# Patient Record
Sex: MALE | Race: WHITE | NOT HISPANIC OR LATINO | URBAN - METROPOLITAN AREA
[De-identification: names, ages, dates, MRNs, and addresses within clinical notes are randomized per-mention and may not be internally consistent; named-entity substitution may affect disease eponyms.]

---

## 2016-10-31 RX ORDER — CARVEDILOL PHOSPHATE 80 MG/1
1 CAPSULE, EXTENDED RELEASE ORAL
Qty: 60 | Refills: 0 | OUTPATIENT
Start: 2016-10-31 | End: 2017-12-29

## 2017-08-10 ENCOUNTER — OUTPATIENT (OUTPATIENT)
Dept: OUTPATIENT SERVICES | Facility: HOSPITAL | Age: 82
LOS: 1 days | Discharge: HOME | End: 2017-08-10
Payer: MEDICARE

## 2017-08-10 DIAGNOSIS — M48.20 KISSING SPINE, SITE UNSPECIFIED: ICD-10-CM

## 2017-08-10 PROCEDURE — 72100 X-RAY EXAM L-S SPINE 2/3 VWS: CPT | Mod: 26

## 2017-08-10 PROCEDURE — 72070 X-RAY EXAM THORAC SPINE 2VWS: CPT | Mod: 26

## 2017-08-11 ENCOUNTER — OUTPATIENT (OUTPATIENT)
Dept: OUTPATIENT SERVICES | Facility: HOSPITAL | Age: 82
LOS: 1 days | Discharge: HOME | End: 2017-08-11
Payer: MEDICARE

## 2017-08-11 DIAGNOSIS — M48.20 KISSING SPINE, SITE UNSPECIFIED: ICD-10-CM

## 2017-08-11 PROCEDURE — 72131 CT LUMBAR SPINE W/O DYE: CPT | Mod: 26

## 2017-08-11 PROCEDURE — 76377 3D RENDER W/INTRP POSTPROCES: CPT | Mod: 26

## 2017-08-15 ENCOUNTER — OUTPATIENT (OUTPATIENT)
Dept: OUTPATIENT SERVICES | Facility: HOSPITAL | Age: 82
LOS: 1 days | Discharge: HOME | End: 2017-08-15
Payer: MEDICARE

## 2017-08-15 DIAGNOSIS — Z11.1 ENCOUNTER FOR SCREENING FOR RESPIRATORY TUBERCULOSIS: ICD-10-CM

## 2017-08-15 PROCEDURE — 71010: CPT | Mod: 26

## 2017-08-16 ENCOUNTER — OUTPATIENT (OUTPATIENT)
Dept: OUTPATIENT SERVICES | Facility: HOSPITAL | Age: 82
LOS: 1 days | Discharge: HOME | End: 2017-08-16
Payer: MEDICARE

## 2017-08-16 DIAGNOSIS — Z01.810 ENCOUNTER FOR PREPROCEDURAL CARDIOVASCULAR EXAMINATION: ICD-10-CM

## 2017-08-17 PROCEDURE — 86077 PHYS BLOOD BANK SERV XMATCH: CPT

## 2017-08-18 ENCOUNTER — OUTPATIENT (OUTPATIENT)
Dept: OUTPATIENT SERVICES | Facility: HOSPITAL | Age: 82
LOS: 1 days | Discharge: HOME | End: 2017-08-18
Payer: MEDICARE

## 2017-08-18 DIAGNOSIS — R94.5 ABNORMAL RESULTS OF LIVER FUNCTION STUDIES: ICD-10-CM

## 2017-08-18 PROCEDURE — 76700 US EXAM ABDOM COMPLETE: CPT | Mod: 26

## 2017-11-09 ENCOUNTER — INPATIENT (INPATIENT)
Facility: HOSPITAL | Age: 82
LOS: 18 days | Discharge: NURSING, REHAB OR LONG-TERM CARE | End: 2017-11-28
Attending: HOSPITALIST | Admitting: HOSPITALIST
Payer: MEDICARE

## 2017-11-09 VITALS
HEIGHT: 66 IN | TEMPERATURE: 98 F | RESPIRATION RATE: 18 BRPM | DIASTOLIC BLOOD PRESSURE: 54 MMHG | SYSTOLIC BLOOD PRESSURE: 124 MMHG | WEIGHT: 119.93 LBS | HEART RATE: 94 BPM

## 2017-11-09 DIAGNOSIS — I10 ESSENTIAL (PRIMARY) HYPERTENSION: ICD-10-CM

## 2017-11-09 DIAGNOSIS — K52.9 NONINFECTIVE GASTROENTERITIS AND COLITIS, UNSPECIFIED: ICD-10-CM

## 2017-11-09 DIAGNOSIS — E11.9 TYPE 2 DIABETES MELLITUS WITHOUT COMPLICATIONS: ICD-10-CM

## 2017-11-09 DIAGNOSIS — N40.0 BENIGN PROSTATIC HYPERPLASIA WITHOUT LOWER URINARY TRACT SYMPTOMS: ICD-10-CM

## 2017-11-09 DIAGNOSIS — Z95.0 PRESENCE OF CARDIAC PACEMAKER: Chronic | ICD-10-CM

## 2017-11-09 DIAGNOSIS — E78.00 PURE HYPERCHOLESTEROLEMIA, UNSPECIFIED: ICD-10-CM

## 2017-11-09 DIAGNOSIS — E86.0 DEHYDRATION: ICD-10-CM

## 2017-11-09 LAB
ALBUMIN SERPL ELPH-MCNC: 3.1 G/DL — LOW (ref 3.3–5)
ALP SERPL-CCNC: 145 U/L — HIGH (ref 40–120)
ALT FLD-CCNC: 19 U/L — SIGNIFICANT CHANGE UP (ref 12–78)
ANION GAP SERPL CALC-SCNC: 9 MMOL/L — SIGNIFICANT CHANGE UP (ref 5–17)
AST SERPL-CCNC: 20 U/L — SIGNIFICANT CHANGE UP (ref 15–37)
BILIRUB SERPL-MCNC: 0.5 MG/DL — SIGNIFICANT CHANGE UP (ref 0.2–1.2)
BUN SERPL-MCNC: 42 MG/DL — HIGH (ref 7–23)
CALCIUM SERPL-MCNC: 8.8 MG/DL — SIGNIFICANT CHANGE UP (ref 8.5–10.1)
CHLORIDE SERPL-SCNC: 105 MMOL/L — SIGNIFICANT CHANGE UP (ref 96–108)
CO2 SERPL-SCNC: 25 MMOL/L — SIGNIFICANT CHANGE UP (ref 22–31)
CREAT SERPL-MCNC: 1.42 MG/DL — HIGH (ref 0.5–1.3)
EOSINOPHIL NFR BLD AUTO: 1 % — SIGNIFICANT CHANGE UP (ref 0–6)
GLUCOSE BLDC GLUCOMTR-MCNC: 103 MG/DL — HIGH (ref 70–99)
GLUCOSE BLDC GLUCOMTR-MCNC: 209 MG/DL — HIGH (ref 70–99)
GLUCOSE BLDC GLUCOMTR-MCNC: 39 MG/DL — CRITICAL LOW (ref 70–99)
GLUCOSE BLDC GLUCOMTR-MCNC: 52 MG/DL — LOW (ref 70–99)
GLUCOSE SERPL-MCNC: 62 MG/DL — LOW (ref 70–99)
HCT VFR BLD CALC: 29.6 % — LOW (ref 39–50)
HGB BLD-MCNC: 9.7 G/DL — LOW (ref 13–17)
LACTATE SERPL-SCNC: 1.2 MMOL/L — SIGNIFICANT CHANGE UP (ref 0.7–2)
LIDOCAIN IGE QN: 122 U/L — SIGNIFICANT CHANGE UP (ref 73–393)
LYMPHOCYTES # BLD AUTO: 10 % — LOW (ref 13–44)
MAGNESIUM SERPL-MCNC: 1.9 MG/DL — SIGNIFICANT CHANGE UP (ref 1.6–2.6)
MCHC RBC-ENTMCNC: 32.2 PG — SIGNIFICANT CHANGE UP (ref 27–34)
MCHC RBC-ENTMCNC: 32.6 GM/DL — SIGNIFICANT CHANGE UP (ref 32–36)
MCV RBC AUTO: 98.8 FL — SIGNIFICANT CHANGE UP (ref 80–100)
MONOCYTES NFR BLD AUTO: 10 % — SIGNIFICANT CHANGE UP (ref 2–14)
NEUTROPHILS NFR BLD AUTO: 79 % — HIGH (ref 43–77)
PLATELET # BLD AUTO: 210 K/UL — SIGNIFICANT CHANGE UP (ref 150–400)
POTASSIUM SERPL-MCNC: 4.4 MMOL/L — SIGNIFICANT CHANGE UP (ref 3.5–5.3)
POTASSIUM SERPL-SCNC: 4.4 MMOL/L — SIGNIFICANT CHANGE UP (ref 3.5–5.3)
PROT SERPL-MCNC: 6.9 GM/DL — SIGNIFICANT CHANGE UP (ref 6–8.3)
RBC # BLD: 2.99 M/UL — LOW (ref 4.2–5.8)
RBC # FLD: 14 % — SIGNIFICANT CHANGE UP (ref 11–15)
SODIUM SERPL-SCNC: 139 MMOL/L — SIGNIFICANT CHANGE UP (ref 135–145)
WBC # BLD: 13.4 K/UL — HIGH (ref 3.8–10.5)
WBC # FLD AUTO: 13.4 K/UL — HIGH (ref 3.8–10.5)

## 2017-11-09 PROCEDURE — 99285 EMERGENCY DEPT VISIT HI MDM: CPT

## 2017-11-09 PROCEDURE — 74176 CT ABD & PELVIS W/O CONTRAST: CPT | Mod: 26

## 2017-11-09 RX ORDER — DEXTROSE 50 % IN WATER 50 %
25 SYRINGE (ML) INTRAVENOUS ONCE
Qty: 0 | Refills: 0 | Status: DISCONTINUED | OUTPATIENT
Start: 2017-11-09 | End: 2017-11-28

## 2017-11-09 RX ORDER — INSULIN LISPRO 100/ML
VIAL (ML) SUBCUTANEOUS
Qty: 0 | Refills: 0 | Status: DISCONTINUED | OUTPATIENT
Start: 2017-11-09 | End: 2017-11-28

## 2017-11-09 RX ORDER — GLUCAGON INJECTION, SOLUTION 0.5 MG/.1ML
1 INJECTION, SOLUTION SUBCUTANEOUS ONCE
Qty: 0 | Refills: 0 | Status: DISCONTINUED | OUTPATIENT
Start: 2017-11-09 | End: 2017-11-28

## 2017-11-09 RX ORDER — SODIUM CHLORIDE 9 MG/ML
250 INJECTION INTRAMUSCULAR; INTRAVENOUS; SUBCUTANEOUS ONCE
Qty: 0 | Refills: 0 | Status: COMPLETED | OUTPATIENT
Start: 2017-11-09 | End: 2017-11-09

## 2017-11-09 RX ORDER — DEXTROSE 50 % IN WATER 50 %
12.5 SYRINGE (ML) INTRAVENOUS ONCE
Qty: 0 | Refills: 0 | Status: DISCONTINUED | OUTPATIENT
Start: 2017-11-09 | End: 2017-11-28

## 2017-11-09 RX ORDER — DEXTROSE 50 % IN WATER 50 %
1 SYRINGE (ML) INTRAVENOUS ONCE
Qty: 0 | Refills: 0 | Status: DISCONTINUED | OUTPATIENT
Start: 2017-11-09 | End: 2017-11-28

## 2017-11-09 RX ORDER — INSULIN LISPRO 100/ML
VIAL (ML) SUBCUTANEOUS AT BEDTIME
Qty: 0 | Refills: 0 | Status: DISCONTINUED | OUTPATIENT
Start: 2017-11-09 | End: 2017-11-28

## 2017-11-09 RX ORDER — ATORVASTATIN CALCIUM 80 MG/1
10 TABLET, FILM COATED ORAL AT BEDTIME
Qty: 0 | Refills: 0 | Status: DISCONTINUED | OUTPATIENT
Start: 2017-11-09 | End: 2017-11-28

## 2017-11-09 RX ORDER — FINASTERIDE 5 MG/1
5 TABLET, FILM COATED ORAL DAILY
Qty: 0 | Refills: 0 | Status: DISCONTINUED | OUTPATIENT
Start: 2017-11-09 | End: 2017-11-28

## 2017-11-09 RX ORDER — HEPARIN SODIUM 5000 [USP'U]/ML
5000 INJECTION INTRAVENOUS; SUBCUTANEOUS EVERY 12 HOURS
Qty: 0 | Refills: 0 | Status: DISCONTINUED | OUTPATIENT
Start: 2017-11-09 | End: 2017-11-28

## 2017-11-09 RX ORDER — SODIUM CHLORIDE 9 MG/ML
1000 INJECTION, SOLUTION INTRAVENOUS
Qty: 0 | Refills: 0 | Status: DISCONTINUED | OUTPATIENT
Start: 2017-11-09 | End: 2017-11-28

## 2017-11-09 RX ORDER — CARVEDILOL PHOSPHATE 80 MG/1
25 CAPSULE, EXTENDED RELEASE ORAL EVERY 12 HOURS
Qty: 0 | Refills: 0 | Status: DISCONTINUED | OUTPATIENT
Start: 2017-11-09 | End: 2017-11-12

## 2017-11-09 RX ORDER — SERTRALINE 25 MG/1
50 TABLET, FILM COATED ORAL DAILY
Qty: 0 | Refills: 0 | Status: DISCONTINUED | OUTPATIENT
Start: 2017-11-09 | End: 2017-11-28

## 2017-11-09 RX ORDER — SODIUM CHLORIDE 9 MG/ML
1000 INJECTION, SOLUTION INTRAVENOUS
Qty: 0 | Refills: 0 | Status: DISCONTINUED | OUTPATIENT
Start: 2017-11-09 | End: 2017-11-23

## 2017-11-09 RX ORDER — LOSARTAN POTASSIUM 100 MG/1
25 TABLET, FILM COATED ORAL DAILY
Qty: 0 | Refills: 0 | Status: DISCONTINUED | OUTPATIENT
Start: 2017-11-09 | End: 2017-11-23

## 2017-11-09 RX ORDER — FUROSEMIDE 40 MG
20 TABLET ORAL DAILY
Qty: 0 | Refills: 0 | Status: DISCONTINUED | OUTPATIENT
Start: 2017-11-09 | End: 2017-11-28

## 2017-11-09 RX ORDER — METRONIDAZOLE 500 MG
500 TABLET ORAL ONCE
Qty: 0 | Refills: 0 | Status: COMPLETED | OUTPATIENT
Start: 2017-11-09 | End: 2017-11-09

## 2017-11-09 RX ORDER — ASPIRIN/CALCIUM CARB/MAGNESIUM 324 MG
81 TABLET ORAL DAILY
Qty: 0 | Refills: 0 | Status: DISCONTINUED | OUTPATIENT
Start: 2017-11-09 | End: 2017-11-28

## 2017-11-09 RX ADMIN — ATORVASTATIN CALCIUM 10 MILLIGRAM(S): 80 TABLET, FILM COATED ORAL at 21:11

## 2017-11-09 RX ADMIN — SODIUM CHLORIDE 500 MILLILITER(S): 9 INJECTION INTRAMUSCULAR; INTRAVENOUS; SUBCUTANEOUS at 11:36

## 2017-11-09 RX ADMIN — SODIUM CHLORIDE 500 MILLILITER(S): 9 INJECTION INTRAMUSCULAR; INTRAVENOUS; SUBCUTANEOUS at 08:07

## 2017-11-09 RX ADMIN — Medication 100 MILLIGRAM(S): at 11:37

## 2017-11-09 RX ADMIN — HEPARIN SODIUM 5000 UNIT(S): 5000 INJECTION INTRAVENOUS; SUBCUTANEOUS at 18:46

## 2017-11-09 RX ADMIN — SODIUM CHLORIDE 100 MILLILITER(S): 9 INJECTION, SOLUTION INTRAVENOUS at 18:46

## 2017-11-09 RX ADMIN — SODIUM CHLORIDE 500 MILLILITER(S): 9 INJECTION INTRAMUSCULAR; INTRAVENOUS; SUBCUTANEOUS at 08:47

## 2017-11-09 NOTE — ED ADULT NURSE NOTE - CHIEF COMPLAINT QUOTE
BIBA  pt c/o diarrhea x 3 days.  pt was dc'd from [facility] last week pt had a L-3 Fx.    pt stated that other people there had diarrhea also.   pt's bilateral cyanotic fingers.

## 2017-11-09 NOTE — ED ADULT NURSE NOTE - OBJECTIVE STATEMENT
Pt A&Ox2, denies any abdominal pain. Pt c/o diarrhea for a few days. Pt denies any chest pain in ED, respiration even and unlabored. Cold finger tips with discoloration noted upon arrival to ED.

## 2017-11-09 NOTE — ED PROVIDER NOTE - MEDICAL DECISION MAKING DETAILS
mild diarrhea over past few days, slightly dry mucus membranes. abd benign. will check labs, cdiff if pt has episode, given recent NH stay with other people having diarrhea. possible empiric treatment with flagyl if unable to give sample. likely d/c.

## 2017-11-09 NOTE — ED PROVIDER NOTE - OBJECTIVE STATEMENT
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 States other people in rehab also had diarrhea. Denies fever, n/v, abd pain, lightheadedness/dizziness, cp, sob. No urinary symptoms (has suprapubic catheter). Can walk with walker, otherwise usual state of health.

## 2017-11-09 NOTE — ED PROVIDER NOTE - CARDIAC, MLM
Normal rate, regular rhythm.  Heart sounds S1, S2.  No murmurs, rubs or gallops. pacemaker in chest.

## 2017-11-09 NOTE — H&P ADULT - HISTORY OF PRESENT ILLNESS
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 States other people in rehab also had diarrhea. Denies fever, n/v, abd pain, lightheadedness/dizziness, cp, sob. No urinary symptoms (has suprapubic catheter). Can walk with walker, otherwise usual state of health.

## 2017-11-09 NOTE — PATIENT PROFILE ADULT. - INFORMATION COULD NOT BE OBTAINED DETAILS
pt is confused at times; pt unable to recall if they have a primary physician pt is confused at times

## 2017-11-09 NOTE — H&P ADULT - NSHPREVIEWOFSYSTEMS_GEN_ALL_CORE
CONSTITUTIONAL: No fever, weight loss, or fatigue  EYES: No eye pain, visual disturbances, or discharge  ENMT:  No difficulty hearing, tinnitus, vertigo; No sinus or throat pain  NECK: No pain or stiffness  RESPIRATORY: No cough, wheezing, chills or hemoptysis; No shortness of breath  CARDIOVASCULAR: No chest pain, palpitations, dizziness, or leg swelling  GASTROINTESTINAL: No abdominal or epigastric pain. No nausea, vomiting, or hematemesis; POSITIVE diarrhea   GENITOURINARY: No dysuria, frequency, hematuria, or incontinence  NEUROLOGICAL: No headaches, memory loss, loss of strength, numbness, or tremors  SKIN: No itching, burning, rashes, or lesions   LYMPH NODES: No enlarged glands  ENDOCRINE: No heat or cold intolerance; No hair loss  MUSCULOSKELETAL: No joint pain or swelling; No muscle, back, or extremity pain  PSYCHIATRIC: No depression, anxiety, mood swings, or difficulty sleeping  HEME/LYMPH: No easy bruising, or bleeding gums  ALLERGY AND IMMUNOLOGIC: No hives or eczema

## 2017-11-09 NOTE — ED PROVIDER NOTE - PROGRESS NOTE DETAILS
Antony: pt's hcp/friend Sarah arrived, states pt has been having ~10 episodes nb diarrhea per day for past few days. states pt was supposed to be going [facility] but has been having issue. will contact SW. continue hydration. will admit as pressures have been ~100/40, dehydrated and with likely cdiff. treat empirically. Dr. Muro aware, paging Dr. Zavala from GI.

## 2017-11-09 NOTE — ED PROVIDER NOTE - PMH
Depression    Diabetes mellitus    Enlarged prostate    Heart attack  1985  High cholesterol    Hypertension

## 2017-11-09 NOTE — ED ADULT NURSE NOTE - PSH
Artificial pacemaker    Enlarged prostate  had surgery  Fracture  left  & had surgery  S/P cataract extraction and insertion of intraocular lens, right  November 2013  S/P inguinal hernia repair  left  Suprapubic catheter  2012

## 2017-11-09 NOTE — ED ADULT TRIAGE NOTE - CHIEF COMPLAINT QUOTE
BIBA  pt c/o diarrhea x 3 days.  pt was dc'd from [facility] last week pt had a L-3 Fx.    pt stated that other people there had diarrhea also.   pt's bilateral cyanotic fingers.

## 2017-11-09 NOTE — H&P ADULT - NSHPLABSRESULTS_GEN_ALL_CORE
9.7    13.4  )-----------( 210      ( 09 Nov 2017 08:15 )             29.6   11-09    139  |  105  |  42<H>  ----------------------------<  62<L>  4.4   |  25  |  1.42<H>    Ca    8.8      09 Nov 2017 08:15  Mg     1.9     11-09    TPro  6.9  /  Alb  3.1<L>  /  TBili  0.5  /  DBili  x   /  AST  20  /  ALT  19  /  AlkPhos  145<H>  11-09  < from: CT Abdomen and Pelvis No Cont (11.09.17 @ 10:25) >    Colitis.  Interval worsening of L3 compression fracture deformity.

## 2017-11-10 LAB
C DIFF BY PCR RESULT: DETECTED
C DIFF TOX GENS STL QL NAA+PROBE: SIGNIFICANT CHANGE UP
GLUCOSE BLDC GLUCOMTR-MCNC: 130 MG/DL — HIGH (ref 70–99)
GLUCOSE BLDC GLUCOMTR-MCNC: 177 MG/DL — HIGH (ref 70–99)
GLUCOSE BLDC GLUCOMTR-MCNC: 203 MG/DL — HIGH (ref 70–99)
GLUCOSE BLDC GLUCOMTR-MCNC: 206 MG/DL — HIGH (ref 70–99)
HBA1C BLD-MCNC: 6.5 % — HIGH (ref 4–5.6)

## 2017-11-10 PROCEDURE — 99233 SBSQ HOSP IP/OBS HIGH 50: CPT

## 2017-11-10 RX ORDER — ACETAMINOPHEN 500 MG
650 TABLET ORAL EVERY 6 HOURS
Qty: 0 | Refills: 0 | Status: DISCONTINUED | OUTPATIENT
Start: 2017-11-10 | End: 2017-11-28

## 2017-11-10 RX ORDER — VANCOMYCIN HCL 1 G
500 VIAL (EA) INTRAVENOUS EVERY 6 HOURS
Qty: 0 | Refills: 0 | Status: DISCONTINUED | OUTPATIENT
Start: 2017-11-10 | End: 2017-11-14

## 2017-11-10 RX ORDER — METRONIDAZOLE 500 MG
500 TABLET ORAL EVERY 8 HOURS
Qty: 0 | Refills: 0 | Status: DISCONTINUED | OUTPATIENT
Start: 2017-11-10 | End: 2017-11-14

## 2017-11-10 RX ORDER — METRONIDAZOLE 500 MG
TABLET ORAL
Qty: 0 | Refills: 0 | Status: DISCONTINUED | OUTPATIENT
Start: 2017-11-10 | End: 2017-11-14

## 2017-11-10 RX ORDER — VANCOMYCIN HCL 1 G
250 VIAL (EA) INTRAVENOUS EVERY 6 HOURS
Qty: 0 | Refills: 0 | Status: DISCONTINUED | OUTPATIENT
Start: 2017-11-10 | End: 2017-11-10

## 2017-11-10 RX ORDER — LACTOBACILLUS ACIDOPHILUS 100MM CELL
1 CAPSULE ORAL
Qty: 0 | Refills: 0 | Status: DISCONTINUED | OUTPATIENT
Start: 2017-11-10 | End: 2017-11-28

## 2017-11-10 RX ORDER — METRONIDAZOLE 500 MG
500 TABLET ORAL ONCE
Qty: 0 | Refills: 0 | Status: COMPLETED | OUTPATIENT
Start: 2017-11-10 | End: 2017-11-10

## 2017-11-10 RX ADMIN — CARVEDILOL PHOSPHATE 25 MILLIGRAM(S): 80 CAPSULE, EXTENDED RELEASE ORAL at 17:43

## 2017-11-10 RX ADMIN — Medication 4: at 07:55

## 2017-11-10 RX ADMIN — HEPARIN SODIUM 5000 UNIT(S): 5000 INJECTION INTRAVENOUS; SUBCUTANEOUS at 06:31

## 2017-11-10 RX ADMIN — ATORVASTATIN CALCIUM 10 MILLIGRAM(S): 80 TABLET, FILM COATED ORAL at 21:34

## 2017-11-10 RX ADMIN — SERTRALINE 50 MILLIGRAM(S): 25 TABLET, FILM COATED ORAL at 13:39

## 2017-11-10 RX ADMIN — CARVEDILOL PHOSPHATE 25 MILLIGRAM(S): 80 CAPSULE, EXTENDED RELEASE ORAL at 06:31

## 2017-11-10 RX ADMIN — SODIUM CHLORIDE 100 MILLILITER(S): 9 INJECTION, SOLUTION INTRAVENOUS at 17:44

## 2017-11-10 RX ADMIN — Medication 500 MILLIGRAM(S): at 17:43

## 2017-11-10 RX ADMIN — Medication 81 MILLIGRAM(S): at 13:39

## 2017-11-10 RX ADMIN — Medication 100 MILLIGRAM(S): at 13:39

## 2017-11-10 RX ADMIN — Medication 20 MILLIGRAM(S): at 13:41

## 2017-11-10 RX ADMIN — Medication 650 MILLIGRAM(S): at 13:40

## 2017-11-10 RX ADMIN — Medication 500 MILLIGRAM(S): at 23:56

## 2017-11-10 RX ADMIN — Medication 500 MILLIGRAM(S): at 13:40

## 2017-11-10 RX ADMIN — SODIUM CHLORIDE 100 MILLILITER(S): 9 INJECTION, SOLUTION INTRAVENOUS at 06:32

## 2017-11-10 RX ADMIN — Medication 1 TABLET(S): at 18:37

## 2017-11-10 RX ADMIN — Medication 100 MILLIGRAM(S): at 21:34

## 2017-11-10 RX ADMIN — Medication 4: at 17:42

## 2017-11-10 RX ADMIN — HEPARIN SODIUM 5000 UNIT(S): 5000 INJECTION INTRAVENOUS; SUBCUTANEOUS at 17:43

## 2017-11-10 RX ADMIN — FINASTERIDE 5 MILLIGRAM(S): 5 TABLET, FILM COATED ORAL at 13:39

## 2017-11-10 NOTE — PROGRESS NOTE ADULT - SUBJECTIVE AND OBJECTIVE BOX
CHIEF COMPLAINT/INTERVAL HISTORY:    Patient is a 89y old  Male who presents with a chief complaint of diarrhea (09 Nov 2017 14:30)      HPI:  89 y/o male pmh signif for HTN, HLD, CAD (s/p AICD placement after vt), DM-2 chronic urinary retention requiring chronic suprapubic cath, recent L3 fracture requiring stay in rehab, d/piper from rehab 4 days ago c/o 2 episodes of non bloody, watery stool per day for past 3 days. States other people in rehab also had diarrhea. Denies fever, n/v, abd pain, lightheadedness/dizziness, cp, sob. No urinary symptoms (has suprapubic catheter). Can walk with walker, otherwise usual state of health. (09 Nov 2017 14:30)    Overnight issues  still having copious diarrhea  POSITIVE for cdiff   SUBJECTIVE & OBJECTIVE: Pt seen and examined at bedside.   ROS:  CONSTITUTIONAL: r fatigue  NECK: No pain or stiffness  RESPIRATORY: No cough, wheezing, chills or hemoptysis; No shortness of breath  CARDIOVASCULAR: No chest pain, palpitations, dizziness, or leg swelling  GASTROINTESTINAL: No abdominal or epigastric pain. No nausea, vomiting, or hematemesis; POSITIVE diarrhea or constipation. No melena or hematochezia.  GENITOURINARY: No dysuria, frequency, hematuria, or incontinence  NEUROLOGICAL: No headaches, memory loss, loss of strength, numbness, or tremors  SKIN: No itching, burning, rashes, or lesions   ICU Vital Signs Last 24 Hrs  T(C): 38.3 (10 Nov 2017 11:05), Max: 38.3 (10 Nov 2017 11:05)  T(F): 101 (10 Nov 2017 11:05), Max: 101 (10 Nov 2017 11:05)  HR: 60 (10 Nov 2017 11:05) (60 - 71)  BP: 94/47 (10 Nov 2017 11:05) (94/47 - 127/51)  BP(mean): --  ABP: --  ABP(mean): --  RR: 14 (10 Nov 2017 11:05) (14 - 18)  SpO2: 97% (10 Nov 2017 11:05) (93% - 97%)        MEDICATIONS  (STANDING):  aspirin enteric coated 81 milliGRAM(s) Oral daily  atorvastatin 10 milliGRAM(s) Oral at bedtime  carvedilol 25 milliGRAM(s) Oral every 12 hours  dextrose 5% + sodium chloride 0.9%. 1000 milliLiter(s) (100 mL/Hr) IV Continuous <Continuous>  dextrose 5%. 1000 milliLiter(s) (50 mL/Hr) IV Continuous <Continuous>  dextrose 50% Injectable 12.5 Gram(s) IV Push once  dextrose 50% Injectable 25 Gram(s) IV Push once  dextrose 50% Injectable 25 Gram(s) IV Push once  finasteride 5 milliGRAM(s) Oral daily  furosemide    Tablet 20 milliGRAM(s) Oral daily  heparin  Injectable 5000 Unit(s) SubCutaneous every 12 hours  insulin lispro (HumaLOG) corrective regimen sliding scale   SubCutaneous three times a day before meals  insulin lispro (HumaLOG) corrective regimen sliding scale   SubCutaneous at bedtime  losartan 25 milliGRAM(s) Oral daily  metroNIDAZOLE  IVPB      metroNIDAZOLE  IVPB 500 milliGRAM(s) IV Intermittent once  metroNIDAZOLE  IVPB 500 milliGRAM(s) IV Intermittent every 8 hours  sertraline 50 milliGRAM(s) Oral daily  vancomycin    Solution 500 milliGRAM(s) Oral every 6 hours    MEDICATIONS  (PRN):  acetaminophen   Tablet 650 milliGRAM(s) Oral every 6 hours PRN For Temp greater than 38 C (100.4 F)  dextrose Gel 1 Dose(s) Oral once PRN Blood Glucose LESS THAN 70 milliGRAM(s)/deciliter  glucagon  Injectable 1 milliGRAM(s) IntraMuscular once PRN Glucose LESS THAN 70 milligrams/deciliter        PHYSICAL EXAM:    GENERAL: NAD, well-groomed, well-developed  HEAD:  Atraumatic, Normocephalic  EYES: EOMI, PERRLA, conjunctiva and sclera clear  ENMT: Moist mucous membranes  NECK: Supple, No JVD  NERVOUS SYSTEM:  Alert & Oriented X3, Motor Strength 5/5 B/L upper and lower extremities; DTRs 2+ intact and symmetric  CHEST/LUNG: Clear to auscultation bilaterally; No rales, rhonchi, wheezing, or rubs  HEART: Regular rate and rhythm; No murmurs, rubs, or gallops  ABDOMEN: Soft, Nontender, Nondistended; Bowel sounds present  EXTREMITIES:  2+ Peripheral Pulses, No clubbing, cyanosis, or edema    LABS:                        9.7    13.4  )-----------( 210      ( 09 Nov 2017 08:15 )             29.6     11-09    139  |  105  |  42<H>  ----------------------------<  62<L>  4.4   |  25  |  1.42<H>    Ca    8.8      09 Nov 2017 08:15  Mg     1.9     11-09    TPro  6.9  /  Alb  3.1<L>  /  TBili  0.5  /  DBili  x   /  AST  20  /  ALT  19  /  AlkPhos  145<H>  11-09          CAPILLARY BLOOD GLUCOSE      POCT Blood Glucose.: 130 mg/dL (10 Nov 2017 11:51)  POCT Blood Glucose.: 206 mg/dL (10 Nov 2017 07:52)  POCT Blood Glucose.: 209 mg/dL (09 Nov 2017 21:13)  POCT Blood Glucose.: 103 mg/dL (09 Nov 2017 17:43)  POCT Blood Glucose.: 52 mg/dL (09 Nov 2017 16:33)  POCT Blood Glucose.: 39 mg/dL (09 Nov 2017 16:31)      RECENT CULTURES:      RADIOLOGY & ADDITIONAL TESTS:  Imaging Personally Reviewed:  [ ] YES      Consultant(s) Notes Reviewed:  [ ] YES     Care Discussed with [ ] Consultants [X ] Patient [ ] Family  [x ]    [x ]  Other; RN  HEALTH ISSUES - PROBLEM Dx:  Type 2 diabetes mellitus without complication, without long-term current use of insulin: Type 2 diabetes mellitus without complication, without long-term current use of insulin  Enlarged prostate: Enlarged prostate  High cholesterol: High cholesterol  Essential hypertension: Essential hypertension  Dehydration: Dehydration  Colitis: Colitis        DVT/GI ppx  Discussed with pt @ bedside

## 2017-11-10 NOTE — SWALLOW BEDSIDE ASSESSMENT ADULT - ORAL PREPARATORY PHASE
Reduced oral grading/Refuses to accept bolus into oral cavity Refuses to accept bolus into oral cavity/Reduced oral grading

## 2017-11-10 NOTE — GOALS OF CARE CONVERSATION - PERSONAL ADVANCE DIRECTIVE - CONVERSATION DETAILS
Asked by Dietitian Danita to see HCP Sarah Denton who had voiced her concern about pt when Danita was taking a history.  Met with HCP Sarah Denton today.  she informed me that she offered pt a room in her home 4 years ago.  She said at that time he was totally IADL's.  But since June when pt had a fall & fractured 2 LS vertabrea he has been in & out of Hospitals & rehab since.  She also informed me that pt seems a little confused.  Her plans before pt got sick this time was he was going to go Live at the [facility] in [place] with hopes that when he is medically stable he will go there to live.  She said the pt has said to her "I'm ready to go".  Went over MOLST with her & she informed me that pt completed all that a few months ago with his  & she has all of them at home.  She informed me he has a Living Will, POA & HCP.  She will bring them in & we will go over them with PMD & MOLST per pts wishes. Asked by Dietititierra [place] to see HCP Sarah Denton who had voiced her concern about pt when Danita was taking a history.  Met with HCP Sarah Denton today.  she informed me that she offered pt a room in her home 4 years ago.  She said at that time he was totally IADL's.  But since June when pt had a fall & fractured 2 LS vertabrea he has been in & out of Hospitals & rehab since.  She also informed me that pt seems a little confused.  Her plans before pt got sick this time was he was going to go Live at the [facility]  with hopes that when he is medically stable he will go there to live.  She said the pt has said to her "I'm ready to go".  Went over MOLST with her & she informed me that pt completed all that a few months ago with his  & she has all of them at home.  She informed me he has a Living Will, POA & HCP.  She will bring them in & we will go over them with PMD & MOLST per pts wishes.

## 2017-11-10 NOTE — DIETITIAN INITIAL EVALUATION ADULT. - ETIOLOGY
Inadequate energy & protein intake due to s/p spinal fx presenting with pain & +c-diff presenting with diarrhea & s/p multiple recent hospitalizations & rehab.

## 2017-11-10 NOTE — SWALLOW BEDSIDE ASSESSMENT ADULT - PHARYNGEAL PHASE
x1 wet vocal quality- pt able to clear with independently initiated throat clear/Delayed pharyngeal swallow/Decreased laryngeal elevation Delayed pharyngeal swallow/Decreased laryngeal elevation

## 2017-11-10 NOTE — DIETITIAN INITIAL EVALUATION ADULT. - PERTINENT LABORATORY DATA
11-09 Na 139 mmol/L Glu 62 mg/dL<L> K+ 4.4 mmol/L Cr  1.42 mg/dL<H> BUN 42 mg/dL<H> Phos n/a   Alb 3.1 g/dL<L> PAB n/a   Hgb 9.7 g/dL<L> Hct 29.6 %<L>, WBC=13.4, FC=386, 209, 103, 52, 39

## 2017-11-10 NOTE — DIETITIAN INITIAL EVALUATION ADULT. - PHYSICAL APPEARANCE
BMI=19.3; no edema; Nutrition focused physical exam conducted ; found signs of malnutrition [ ]absent [x ]present.  Subcutaneous fat loss: [moderate ] Orbital fat pads region, [moderate ]Buccal fat region, [severe ]Triceps region,  [moderate ]Ribs region.  Muscle wasting: [moderate ]Temples region, [moderate ]Clavicle region, [severe ]Shoulder region, [unable ]Scapula region, [unable ]Interosseous region,  [severe ]thigh region, [severe ]Calf region/underweight/debilitated

## 2017-11-10 NOTE — DIETITIAN INITIAL EVALUATION ADULT. - OTHER INFO
Pt seen for RN consult 11/10 for poor po intake.  Pt with recent hospitalizations & rehabs since June 2017. Pt lives with friend & reports decreased po intake x 4 /12 months with s/p falls & physical decline due to 2 x spinal fractures. RN reports pt coughing this am & SOB after swallowing liquids & solid foods. Noted pt wearing upper & lower dentures. Friend states pt ate 100% breakfast 11/9; pancakes & sausage & OJ. Pt presents with diarrhea due to +C-diff.  Pt with hx DM type 2 manages with Glipizide 10mg 2 x day & Metformin XR 500mg daily. Pt seen for RN consult 11/10 for poor po intake.  Pt with recent hospitalizations & rehabs since June 2017. Pt lives with friend & reports decreased po intake x 4 /12 months with s/p falls & physical decline due to 2 x spinal fractures. RN reports pt coughing this am & SOB after swallowing liquids & solid foods. Noted pt wearing upper & lower dentures. Friend states pt ate 100% breakfast 11/9; pancakes & sausage & OJ & 0% lunch(11/9) & <25% dinner (11/9). Pt presents with diarrhea due to +C-diff.  Pt with hx DM type 2 manages with Glipizide 10mg 2 x day & Metformin XR 500mg daily.

## 2017-11-10 NOTE — SWALLOW BEDSIDE ASSESSMENT ADULT - SWALLOW EVAL: DIAGNOSIS
pt presented with refusal of po trials therefore limiting eval, overall weakness which may be negatively impacting swallow function, and oropharyngeal dysphagia marked by decreased labial seal to spoon, slow oral transport, delay in swallow trigger with reduced laryngeal elevation.

## 2017-11-10 NOTE — SWALLOW BEDSIDE ASSESSMENT ADULT - SLP PERTINENT HISTORY OF CURRENT PROBLEM
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 States other people in rehab also had diarrhea. Denies fever, n/v, abd pain, lightheadedness/dizziness, cp, sob. No urinary symptoms (has suprapubic catheter). Can walk with walker, otherwise usual state of health. (09 Nov 2017 14:30)

## 2017-11-10 NOTE — SWALLOW BEDSIDE ASSESSMENT ADULT - SWALLOW EVAL: PATIENT/FAMILY GOALS STATEMENT
caregiver reported "every once in a while he coughs" and that he hadn't eaten much today. the pt asked for the bed to be lowered.

## 2017-11-10 NOTE — SWALLOW BEDSIDE ASSESSMENT ADULT - SLP GENERAL OBSERVATIONS
pt seen bedside alert and oriented x2. pt responded to simple want/autobiographical questions, and although reduced initiation he verbalized needs. noted fleeting eye contact and he was able to follow one step directions..

## 2017-11-10 NOTE — CHART NOTE - NSCHARTNOTEFT_GEN_A_CORE
Upon Nutritional Assessment by the Registered Dietitian your patient was determined to meet criteria / has evidence of the following diagnosis/diagnoses:          [ ]  Mild Protein Calorie Malnutrition        [ ]  Moderate Protein Calorie Malnutrition        [x ] Severe Protein Calorie Malnutrition        [ ] Unspecified Protein Calorie Malnutrition        [ ] Underweight / BMI <19        [ ] Morbid Obesity / BMI > 40      Findings as based on:  •  Comprehensive nutrition assessment and consultation  •  Calorie counts (nutrient intake analysis)  •  Food acceptance and intake status from observations by staff  •  Follow up  •  Patient education  •  Intervention secondary to interdisciplinary rounds  •   concerns      Treatment:    The following diet has been recommended:  Mechanical soft/CCHHO with snack/Glucerna shake 1 can 2 x day(220kcal & 10gm protein)    PROVIDER Section:     By signing this assessment you are acknowledging and agree with the diagnosis/diagnoses assigned by the Registered Dietitian    Comments:

## 2017-11-10 NOTE — DIETITIAN INITIAL EVALUATION ADULT. - SOURCE
family/significant other/other (specify)/Spoke to [place] (pt's health care proxy) & medical chart review

## 2017-11-11 DIAGNOSIS — E87.6 HYPOKALEMIA: ICD-10-CM

## 2017-11-11 DIAGNOSIS — D64.9 ANEMIA, UNSPECIFIED: ICD-10-CM

## 2017-11-11 LAB
ANION GAP SERPL CALC-SCNC: 10 MMOL/L — SIGNIFICANT CHANGE UP (ref 5–17)
BUN SERPL-MCNC: 40 MG/DL — HIGH (ref 7–23)
CALCIUM SERPL-MCNC: 7.9 MG/DL — LOW (ref 8.5–10.1)
CHLORIDE SERPL-SCNC: 109 MMOL/L — HIGH (ref 96–108)
CO2 SERPL-SCNC: 20 MMOL/L — LOW (ref 22–31)
CREAT SERPL-MCNC: 1.54 MG/DL — HIGH (ref 0.5–1.3)
GLUCOSE BLDC GLUCOMTR-MCNC: 155 MG/DL — HIGH (ref 70–99)
GLUCOSE BLDC GLUCOMTR-MCNC: 196 MG/DL — HIGH (ref 70–99)
GLUCOSE BLDC GLUCOMTR-MCNC: 219 MG/DL — HIGH (ref 70–99)
GLUCOSE BLDC GLUCOMTR-MCNC: 236 MG/DL — HIGH (ref 70–99)
GLUCOSE BLDC GLUCOMTR-MCNC: 242 MG/DL — HIGH (ref 70–99)
GLUCOSE SERPL-MCNC: 143 MG/DL — HIGH (ref 70–99)
HCT VFR BLD CALC: 24.8 % — LOW (ref 39–50)
HGB BLD-MCNC: 8.5 G/DL — LOW (ref 13–17)
MCHC RBC-ENTMCNC: 32.9 PG — SIGNIFICANT CHANGE UP (ref 27–34)
MCHC RBC-ENTMCNC: 34 GM/DL — SIGNIFICANT CHANGE UP (ref 32–36)
MCV RBC AUTO: 96.6 FL — SIGNIFICANT CHANGE UP (ref 80–100)
OB PNL STL: NEGATIVE — SIGNIFICANT CHANGE UP
PLATELET # BLD AUTO: 156 K/UL — SIGNIFICANT CHANGE UP (ref 150–400)
POTASSIUM SERPL-MCNC: 3.3 MMOL/L — LOW (ref 3.5–5.3)
POTASSIUM SERPL-SCNC: 3.3 MMOL/L — LOW (ref 3.5–5.3)
RBC # BLD: 2.57 M/UL — LOW (ref 4.2–5.8)
RBC # FLD: 13.6 % — SIGNIFICANT CHANGE UP (ref 11–15)
SODIUM SERPL-SCNC: 139 MMOL/L — SIGNIFICANT CHANGE UP (ref 135–145)
WBC # BLD: 11.7 K/UL — HIGH (ref 3.8–10.5)
WBC # FLD AUTO: 11.7 K/UL — HIGH (ref 3.8–10.5)

## 2017-11-11 PROCEDURE — 99233 SBSQ HOSP IP/OBS HIGH 50: CPT

## 2017-11-11 RX ORDER — SODIUM CHLORIDE 9 MG/ML
500 INJECTION INTRAMUSCULAR; INTRAVENOUS; SUBCUTANEOUS ONCE
Qty: 0 | Refills: 0 | Status: COMPLETED | OUTPATIENT
Start: 2017-11-11 | End: 2017-11-11

## 2017-11-11 RX ORDER — POTASSIUM CHLORIDE 20 MEQ
40 PACKET (EA) ORAL ONCE
Qty: 0 | Refills: 0 | Status: COMPLETED | OUTPATIENT
Start: 2017-11-11 | End: 2017-11-11

## 2017-11-11 RX ORDER — NYSTATIN CREAM 100000 [USP'U]/G
1 CREAM TOPICAL
Qty: 0 | Refills: 0 | Status: DISCONTINUED | OUTPATIENT
Start: 2017-11-11 | End: 2017-11-28

## 2017-11-11 RX ADMIN — Medication 500 MILLIGRAM(S): at 17:28

## 2017-11-11 RX ADMIN — Medication 2: at 08:04

## 2017-11-11 RX ADMIN — ATORVASTATIN CALCIUM 10 MILLIGRAM(S): 80 TABLET, FILM COATED ORAL at 21:19

## 2017-11-11 RX ADMIN — Medication 2: at 16:09

## 2017-11-11 RX ADMIN — Medication 500 MILLIGRAM(S): at 05:49

## 2017-11-11 RX ADMIN — SODIUM CHLORIDE 500 MILLILITER(S): 9 INJECTION INTRAMUSCULAR; INTRAVENOUS; SUBCUTANEOUS at 12:11

## 2017-11-11 RX ADMIN — Medication 81 MILLIGRAM(S): at 12:01

## 2017-11-11 RX ADMIN — Medication 100 MILLIGRAM(S): at 21:19

## 2017-11-11 RX ADMIN — Medication 1 TABLET(S): at 17:29

## 2017-11-11 RX ADMIN — HEPARIN SODIUM 5000 UNIT(S): 5000 INJECTION INTRAVENOUS; SUBCUTANEOUS at 05:49

## 2017-11-11 RX ADMIN — SERTRALINE 50 MILLIGRAM(S): 25 TABLET, FILM COATED ORAL at 12:01

## 2017-11-11 RX ADMIN — Medication 20 MILLIGRAM(S): at 06:09

## 2017-11-11 RX ADMIN — HEPARIN SODIUM 5000 UNIT(S): 5000 INJECTION INTRAVENOUS; SUBCUTANEOUS at 17:29

## 2017-11-11 RX ADMIN — Medication 4: at 11:55

## 2017-11-11 RX ADMIN — Medication 1 TABLET(S): at 08:05

## 2017-11-11 RX ADMIN — Medication 40 MILLIEQUIVALENT(S): at 08:56

## 2017-11-11 RX ADMIN — SODIUM CHLORIDE 100 MILLILITER(S): 9 INJECTION, SOLUTION INTRAVENOUS at 05:49

## 2017-11-11 RX ADMIN — Medication 100 MILLIGRAM(S): at 14:10

## 2017-11-11 RX ADMIN — Medication 500 MILLIGRAM(S): at 12:01

## 2017-11-11 RX ADMIN — Medication 500 MILLIGRAM(S): at 23:17

## 2017-11-11 RX ADMIN — FINASTERIDE 5 MILLIGRAM(S): 5 TABLET, FILM COATED ORAL at 12:01

## 2017-11-11 RX ADMIN — Medication 100 MILLIGRAM(S): at 05:49

## 2017-11-11 RX ADMIN — CARVEDILOL PHOSPHATE 25 MILLIGRAM(S): 80 CAPSULE, EXTENDED RELEASE ORAL at 05:49

## 2017-11-11 RX ADMIN — NYSTATIN CREAM 1 APPLICATION(S): 100000 CREAM TOPICAL at 17:34

## 2017-11-11 NOTE — GOALS OF CARE CONVERSATION - PERSONAL ADVANCE DIRECTIVE - NS PRO AD PATIENT TYPE
Health Care Proxy (HCP)/Living Will/Do Not Resuscitate (DNR)/Medical Orders for Life-Sustaining Treatment (MOLST)/No Peg

## 2017-11-11 NOTE — PROGRESS NOTE ADULT - PROBLEM SELECTOR PLAN 8
pt had slight drop in hemoglobin 9.7 to 8.5. baseline is 10-11   possibly d.t to hemodilution, but will get guaiac

## 2017-11-11 NOTE — PROGRESS NOTE ADULT - SUBJECTIVE AND OBJECTIVE BOX
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 States other people in rehab also had diarrhea. Denies fever, n/v, abd pain, lightheadedness/dizziness, cp, sob. No urinary symptoms (has suprapubic catheter). Can walk with walker, otherwise usual state of health. (09 Nov 2017 14:30) + for c diff       SUBJECTIVE & OBJECTIVE: Pt seen and examined at bedside. still having  diarrhea but improving  ROS:  CONSTITUTIONAL: r fatigue  NECK: No pain or stiffness  RESPIRATORY: No cough, wheezing, chills or hemoptysis; No shortness of breath  CARDIOVASCULAR: No chest pain, palpitations, dizziness, or leg swelling  GASTROINTESTINAL: No abdominal or epigastric pain. No nausea, vomiting, or hematemesis; POSITIVE diarrhea or constipation. No melena or hematochezia.  GENITOURINARY: No dysuria, frequency, hematuria, or incontinence  NEUROLOGICAL: No headaches, memory loss, loss of strength, numbness, or tremors  SKIN: No itching, burning, rashes, or lesions     Vital Signs Last 24 Hrs  T(C): 36.9 (11 Nov 2017 05:07), Max: 38.3 (10 Nov 2017 11:05)  T(F): 98.5 (11 Nov 2017 05:07), Max: 101 (10 Nov 2017 11:05)  HR: 62 (11 Nov 2017 05:07) (60 - 78)  BP: 100/50 (11 Nov 2017 05:07) (94/47 - 128/56)  BP(mean): --  RR: 15 (11 Nov 2017 05:07) (14 - 16)  SpO2: 100% (11 Nov 2017 05:07) (96% - 100%)    MEDICATIONS  (STANDING):  aspirin enteric coated 81 milliGRAM(s) Oral daily  atorvastatin 10 milliGRAM(s) Oral at bedtime  carvedilol 25 milliGRAM(s) Oral every 12 hours  dextrose 5% + sodium chloride 0.9%. 1000 milliLiter(s) (100 mL/Hr) IV Continuous <Continuous>  dextrose 5%. 1000 milliLiter(s) (50 mL/Hr) IV Continuous <Continuous>  dextrose 50% Injectable 12.5 Gram(s) IV Push once  dextrose 50% Injectable 25 Gram(s) IV Push once  dextrose 50% Injectable 25 Gram(s) IV Push once  finasteride 5 milliGRAM(s) Oral daily  furosemide    Tablet 20 milliGRAM(s) Oral daily  heparin  Injectable 5000 Unit(s) SubCutaneous every 12 hours  insulin lispro (HumaLOG) corrective regimen sliding scale   SubCutaneous three times a day before meals  insulin lispro (HumaLOG) corrective regimen sliding scale   SubCutaneous at bedtime  losartan 25 milliGRAM(s) Oral daily  metroNIDAZOLE  IVPB      metroNIDAZOLE  IVPB 500 milliGRAM(s) IV Intermittent once  metroNIDAZOLE  IVPB 500 milliGRAM(s) IV Intermittent every 8 hours  sertraline 50 milliGRAM(s) Oral daily  vancomycin    Solution 500 milliGRAM(s) Oral every 6 hours    MEDICATIONS  (PRN):  acetaminophen   Tablet 650 milliGRAM(s) Oral every 6 hours PRN For Temp greater than 38 C (100.4 F)  dextrose Gel 1 Dose(s) Oral once PRN Blood Glucose LESS THAN 70 milliGRAM(s)/deciliter  glucagon  Injectable 1 milliGRAM(s) IntraMuscular once PRN Glucose LESS THAN 70 milligrams/deciliter        PHYSICAL EXAM:    GENERAL: NAD, well-groomed, well-developed  HEAD:  Atraumatic, Normocephalic  EYES: EOMI, PERRLA, conjunctiva and sclera clear  ENMT: Moist mucous membranes  NECK: Supple, No JVD  NERVOUS SYSTEM:  Alert & Oriented X3, Motor Strength 5/5 B/L upper and lower extremities; DTRs 2+ intact and symmetric  CHEST/LUNG: Clear to auscultation bilaterally; No rales, rhonchi, wheezing, or rubs  HEART: Regular rate and rhythm; No murmurs, rubs, or gallops  ABDOMEN: Soft, Nontender, Nondistended; Bowel sounds present  EXTREMITIES:  2+ Peripheral Pulses, No clubbing, cyanosis, or edema    LABS:                                   8.5    11.7  )-----------( 156      ( 11 Nov 2017 05:55 )             24.8     11-11    139  |  109<H>  |  40<H>  ----------------------------<  143<H>  3.3<L>   |  20<L>  |  1.54<H>    Ca    7.9<L>      11 Nov 2017 05:55                    CAPILLARY BLOOD GLUCOSE      POCT Blood Glucose.: 130 mg/dL (10 Nov 2017 11:51)  POCT Blood Glucose.: 206 mg/dL (10 Nov 2017 07:52)  POCT Blood Glucose.: 209 mg/dL (09 Nov 2017 21:13)  POCT Blood Glucose.: 103 mg/dL (09 Nov 2017 17:43)  POCT Blood Glucose.: 52 mg/dL (09 Nov 2017 16:33)  POCT Blood Glucose.: 39 mg/dL (09 Nov 2017 16:31)      RECENT CULTURES:      RADIOLOGY & ADDITIONAL TESTS:  Imaging Personally Reviewed:  [ ] YES      Consultant(s) Notes Reviewed:  [ ] YES     Care Discussed with [ ] Consultants [X ] Patient [ ] Family  [x ]    [x ]  Other; RN  HEALTH ISSUES - PROBLEM Dx:  Type 2 diabetes mellitus without complication, without long-term current use of insulin: Type 2 diabetes mellitus without complication, without long-term current use of insulin  Enlarged prostate: Enlarged prostate  High cholesterol: High cholesterol  Essential hypertension: Essential hypertension  Dehydration: Dehydration  Colitis: Colitis        DVT/GI ppx  Discussed with pt @ bedside

## 2017-11-11 NOTE — PROGRESS NOTE ADULT - PROBLEM SELECTOR PLAN 1
secondary to cdiff. diarrhea improving. currently afebrile    continue vanco  continue flagyl  add probiotic

## 2017-11-11 NOTE — GOALS OF CARE CONVERSATION - PERSONAL ADVANCE DIRECTIVE - CONVERSATION/DISCUSSION
Prognosis/Palliative Care Referral/asked by Danita Baron Dept
[facility] Discussed/Prognosis/Palliative Care Referral

## 2017-11-11 NOTE — GOALS OF CARE CONVERSATION - PERSONAL ADVANCE DIRECTIVE - NS PRO AD PATIENT TYPE ON CHART
Medical Orders for Life-Sustaining Treatment (MOLST)/DNI, No Peg/Health Care Proxy (HCP)/Do Not Resuscitate (DNR)/Living Will

## 2017-11-11 NOTE — PHYSICAL THERAPY INITIAL EVALUATION ADULT - PERTINENT HX OF CURRENT PROBLEM, REHAB EVAL
Per H&P, Pt is a 89 y/o male PMH: HTN, HLD, CAD (s/p AICD placement after vt), DM-2 chronic urinary retention requiring chronic suprapubic cath, recent L3 fracture requiring stay in rehab, d/c from rehab 4 days ago c/o 2 episodes of non bloody, watery stool per day for past 3 days. Pt found to be C-diff positive.

## 2017-11-11 NOTE — PHYSICAL THERAPY INITIAL EVALUATION ADULT - MODIFIED CLINICAL TEST OF SENSORY INTEGRATION IN BALANCE TEST
Barthel Index: Feeding Score _0__, Bathing Score __0_, Grooming Score _0__, Dressing Score _0__, Bowels Score _0__, Bladder Score __0_, Toilet Score __0_, Transfers Score __0_, Mobility Score __0_, Stairs Score ___0,     Total Score _0__

## 2017-11-11 NOTE — GOALS OF CARE CONVERSATION - PERSONAL ADVANCE DIRECTIVE - CONVERSATION DETAILS
Met with Sarah Denton today 11/11/17 because she brought in pts Living Will & HCP.  Completed MOLST based on Living Will for DNR,DNI, & No Peg.  HCP also placed on chart who is Sarah Denton.  PMD Dr Barron made aware.

## 2017-11-11 NOTE — GOALS OF CARE CONVERSATION - PERSONAL ADVANCE DIRECTIVE - WHAT MATTERS MOST
Per HCP we will meet tomorrow 11/11/17 & go over Living Will & MOLST per pts wishes.  Plan is when pt is ready for DC that he will go to live at the [facility].
MOLST completed per Pt's wishes, Living Will & HCP.

## 2017-11-12 DIAGNOSIS — I50.42 CHRONIC COMBINED SYSTOLIC (CONGESTIVE) AND DIASTOLIC (CONGESTIVE) HEART FAILURE: ICD-10-CM

## 2017-11-12 LAB
ANION GAP SERPL CALC-SCNC: 10 MMOL/L — SIGNIFICANT CHANGE UP (ref 5–17)
BUN SERPL-MCNC: 32 MG/DL — HIGH (ref 7–23)
CALCIUM SERPL-MCNC: 7.8 MG/DL — LOW (ref 8.5–10.1)
CHLORIDE SERPL-SCNC: 112 MMOL/L — HIGH (ref 96–108)
CO2 SERPL-SCNC: 19 MMOL/L — LOW (ref 22–31)
CREAT SERPL-MCNC: 1.46 MG/DL — HIGH (ref 0.5–1.3)
GLUCOSE BLDC GLUCOMTR-MCNC: 156 MG/DL — HIGH (ref 70–99)
GLUCOSE BLDC GLUCOMTR-MCNC: 237 MG/DL — HIGH (ref 70–99)
GLUCOSE BLDC GLUCOMTR-MCNC: 240 MG/DL — HIGH (ref 70–99)
GLUCOSE BLDC GLUCOMTR-MCNC: 251 MG/DL — HIGH (ref 70–99)
GLUCOSE SERPL-MCNC: 237 MG/DL — HIGH (ref 70–99)
HCT VFR BLD CALC: 24 % — LOW (ref 39–50)
HGB BLD-MCNC: 8 G/DL — LOW (ref 13–17)
MAGNESIUM SERPL-MCNC: 1.6 MG/DL — SIGNIFICANT CHANGE UP (ref 1.6–2.6)
MCHC RBC-ENTMCNC: 32.6 PG — SIGNIFICANT CHANGE UP (ref 27–34)
MCHC RBC-ENTMCNC: 33.1 GM/DL — SIGNIFICANT CHANGE UP (ref 32–36)
MCV RBC AUTO: 98.2 FL — SIGNIFICANT CHANGE UP (ref 80–100)
PHOSPHATE SERPL-MCNC: 1.5 MG/DL — LOW (ref 2.5–4.5)
PLATELET # BLD AUTO: 152 K/UL — SIGNIFICANT CHANGE UP (ref 150–400)
POTASSIUM SERPL-MCNC: 3.3 MMOL/L — LOW (ref 3.5–5.3)
POTASSIUM SERPL-SCNC: 3.3 MMOL/L — LOW (ref 3.5–5.3)
RBC # BLD: 2.45 M/UL — LOW (ref 4.2–5.8)
RBC # FLD: 14 % — SIGNIFICANT CHANGE UP (ref 11–15)
SODIUM SERPL-SCNC: 141 MMOL/L — SIGNIFICANT CHANGE UP (ref 135–145)
WBC # BLD: 10.2 K/UL — SIGNIFICANT CHANGE UP (ref 3.8–10.5)
WBC # FLD AUTO: 10.2 K/UL — SIGNIFICANT CHANGE UP (ref 3.8–10.5)

## 2017-11-12 PROCEDURE — 99233 SBSQ HOSP IP/OBS HIGH 50: CPT

## 2017-11-12 PROCEDURE — 71010: CPT | Mod: 26

## 2017-11-12 RX ORDER — POTASSIUM PHOSPHATE, MONOBASIC POTASSIUM PHOSPHATE, DIBASIC 236; 224 MG/ML; MG/ML
15 INJECTION, SOLUTION INTRAVENOUS ONCE
Qty: 0 | Refills: 0 | Status: COMPLETED | OUTPATIENT
Start: 2017-11-12 | End: 2017-11-12

## 2017-11-12 RX ORDER — POTASSIUM CHLORIDE 20 MEQ
40 PACKET (EA) ORAL ONCE
Qty: 0 | Refills: 0 | Status: COMPLETED | OUTPATIENT
Start: 2017-11-12 | End: 2017-11-12

## 2017-11-12 RX ADMIN — Medication 20 MILLIGRAM(S): at 05:49

## 2017-11-12 RX ADMIN — POTASSIUM PHOSPHATE, MONOBASIC POTASSIUM PHOSPHATE, DIBASIC 63.75 MILLIMOLE(S): 236; 224 INJECTION, SOLUTION INTRAVENOUS at 09:41

## 2017-11-12 RX ADMIN — Medication 500 MILLIGRAM(S): at 05:50

## 2017-11-12 RX ADMIN — Medication 100 MILLIGRAM(S): at 05:49

## 2017-11-12 RX ADMIN — NYSTATIN CREAM 1 APPLICATION(S): 100000 CREAM TOPICAL at 17:27

## 2017-11-12 RX ADMIN — LOSARTAN POTASSIUM 25 MILLIGRAM(S): 100 TABLET, FILM COATED ORAL at 05:49

## 2017-11-12 RX ADMIN — FINASTERIDE 5 MILLIGRAM(S): 5 TABLET, FILM COATED ORAL at 12:25

## 2017-11-12 RX ADMIN — SODIUM CHLORIDE 100 MILLILITER(S): 9 INJECTION, SOLUTION INTRAVENOUS at 05:49

## 2017-11-12 RX ADMIN — Medication 81 MILLIGRAM(S): at 12:25

## 2017-11-12 RX ADMIN — SERTRALINE 50 MILLIGRAM(S): 25 TABLET, FILM COATED ORAL at 12:25

## 2017-11-12 RX ADMIN — Medication 500 MILLIGRAM(S): at 11:15

## 2017-11-12 RX ADMIN — Medication 4: at 07:50

## 2017-11-12 RX ADMIN — Medication 1 TABLET(S): at 07:50

## 2017-11-12 RX ADMIN — CARVEDILOL PHOSPHATE 25 MILLIGRAM(S): 80 CAPSULE, EXTENDED RELEASE ORAL at 05:49

## 2017-11-12 RX ADMIN — Medication 100 MILLIGRAM(S): at 21:46

## 2017-11-12 RX ADMIN — Medication 500 MILLIGRAM(S): at 23:13

## 2017-11-12 RX ADMIN — NYSTATIN CREAM 1 APPLICATION(S): 100000 CREAM TOPICAL at 05:50

## 2017-11-12 RX ADMIN — ATORVASTATIN CALCIUM 10 MILLIGRAM(S): 80 TABLET, FILM COATED ORAL at 21:47

## 2017-11-12 RX ADMIN — HEPARIN SODIUM 5000 UNIT(S): 5000 INJECTION INTRAVENOUS; SUBCUTANEOUS at 17:27

## 2017-11-12 RX ADMIN — Medication 40 MILLIEQUIVALENT(S): at 08:48

## 2017-11-12 RX ADMIN — HEPARIN SODIUM 5000 UNIT(S): 5000 INJECTION INTRAVENOUS; SUBCUTANEOUS at 05:49

## 2017-11-12 RX ADMIN — Medication 1 TABLET(S): at 17:27

## 2017-11-12 RX ADMIN — Medication 2: at 16:22

## 2017-11-12 RX ADMIN — Medication 100 MILLIGRAM(S): at 13:46

## 2017-11-12 RX ADMIN — Medication 6: at 11:07

## 2017-11-12 RX ADMIN — Medication 500 MILLIGRAM(S): at 17:27

## 2017-11-12 NOTE — PROGRESS NOTE ADULT - PROBLEM SELECTOR PLAN 5
renew home meds  Pt has suprapubic cath that needs to be changed as its been a month. Will have urology change when diarrhea improves more.

## 2017-11-12 NOTE — PROGRESS NOTE ADULT - PROBLEM SELECTOR PLAN 8
pt had drop in hemoglobin 9.7 to 8.5 to 8.o. baseline is 10-11   possibly d.t to hemodilution.  guaiac negative. Urine clear  continue to monitor   active type and screen  hold aspirin

## 2017-11-12 NOTE — PROGRESS NOTE ADULT - SUBJECTIVE AND OBJECTIVE BOX
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 States other people in rehab also had diarrhea. Denies fever, n/v, abd pain, lightheadedness/dizziness, cp, sob. No urinary symptoms (has suprapubic catheter). Can walk with walker, otherwise usual state of health. (09 Nov 2017 14:30) + for c diff       SUBJECTIVE & OBJECTIVE: Pt seen and examined at bedside. diarrhea remains. Pt was given a bolus of fluids overnight d/t hypotension. pt afebrile.     ROS:  CONSTITUTIONAL: no fever  NECK: No pain or stiffness  RESPIRATORY: No cough, wheezing, chills or hemoptysis; No shortness of breath  CARDIOVASCULAR: No chest pain, palpitations, dizziness, or leg swelling  GASTROINTESTINAL: No abdominal or epigastric pain. No nausea, vomiting, or hematemesis; POSITIVE diarrhea or constipation. No melena or hematochezia.  GENITOURINARY: No dysuria, frequency, hematuria, or incontinence  NEUROLOGICAL: No headaches, memory loss, loss of strength, numbness, or tremors  SKIN: No itching, burning, rashes, or lesions     Vital Signs Last 24 Hrs  T(C): 36.8 (12 Nov 2017 13:12), Max: 37.1 (12 Nov 2017 00:05)  T(F): 98.2 (12 Nov 2017 13:12), Max: 98.7 (12 Nov 2017 00:05)  HR: 68 (12 Nov 2017 13:12) (61 - 71)  BP: 117/73 (12 Nov 2017 13:12) (108/52 - 131/54)  BP(mean): --  RR: 18 (12 Nov 2017 13:12) (16 - 18)  SpO2: 96% (12 Nov 2017 13:12) (95% - 96%)    MEDICATIONS  (STANDING):  aspirin enteric coated 81 milliGRAM(s) Oral daily  atorvastatin 10 milliGRAM(s) Oral at bedtime  carvedilol 25 milliGRAM(s) Oral every 12 hours  dextrose 5% + sodium chloride 0.9%. 1000 milliLiter(s) (100 mL/Hr) IV Continuous <Continuous>  dextrose 5%. 1000 milliLiter(s) (50 mL/Hr) IV Continuous <Continuous>  dextrose 50% Injectable 12.5 Gram(s) IV Push once  dextrose 50% Injectable 25 Gram(s) IV Push once  dextrose 50% Injectable 25 Gram(s) IV Push once  finasteride 5 milliGRAM(s) Oral daily  furosemide    Tablet 20 milliGRAM(s) Oral daily  heparin  Injectable 5000 Unit(s) SubCutaneous every 12 hours  insulin lispro (HumaLOG) corrective regimen sliding scale   SubCutaneous three times a day before meals  insulin lispro (HumaLOG) corrective regimen sliding scale   SubCutaneous at bedtime  lactobacillus acidophilus 1 Tablet(s) Oral two times a day with meals  losartan 25 milliGRAM(s) Oral daily  metroNIDAZOLE  IVPB      metroNIDAZOLE  IVPB 500 milliGRAM(s) IV Intermittent every 8 hours  nystatin Cream 1 Application(s) Topical two times a day  sertraline 50 milliGRAM(s) Oral daily  vancomycin    Solution 500 milliGRAM(s) Oral every 6 hours    MEDICATIONS  (PRN):  acetaminophen   Tablet 650 milliGRAM(s) Oral every 6 hours PRN For Temp greater than 38 C (100.4 F)  dextrose Gel 1 Dose(s) Oral once PRN Blood Glucose LESS THAN 70 milliGRAM(s)/deciliter  glucagon  Injectable 1 milliGRAM(s) IntraMuscular once PRN Glucose LESS THAN 70 milligrams/deciliter          PHYSICAL EXAM:    GENERAL: NAD, well-groomed, well-developed  HEAD:  Atraumatic, Normocephalic  EYES: EOMI, PERRLA, conjunctiva and sclera clear  ENMT: Moist mucous membranes  NECK: Supple, No JVD  NERVOUS SYSTEM:  Alert & Oriented X3, Motor Strength 5/5 B/L upper and lower extremities; DTRs 2+ intact and symmetric  CHEST/LUNG: mild bibasilar crackles. no respiratory distress.   HEART: Regular rate and rhythm; No murmurs, rubs, or gallops  ABDOMEN: Soft, Nontender, Nondistended; Bowel sounds present  EXTREMITIES:  2+ Peripheral Pulses, No clubbing, cyanosis, or edema    LABS:                                                      8.0    10.2  )-----------( 152      ( 12 Nov 2017 06:27 )             24.0     11-12    141  |  112<H>  |  32<H>  ----------------------------<  237<H>  3.3<L>   |  19<L>  |  1.46<H>    Ca    7.8<L>      12 Nov 2017 06:27  Phos  1.5     11-12  Mg     1.6     11-12                          CAPILLARY BLOOD GLUCOSE      POCT Blood Glucose.: 130 mg/dL (10 Nov 2017 11:51)  POCT Blood Glucose.: 206 mg/dL (10 Nov 2017 07:52)  POCT Blood Glucose.: 209 mg/dL (09 Nov 2017 21:13)  POCT Blood Glucose.: 103 mg/dL (09 Nov 2017 17:43)  POCT Blood Glucose.: 52 mg/dL (09 Nov 2017 16:33)  POCT Blood Glucose.: 39 mg/dL (09 Nov 2017 16:31)      RECENT CULTURES:      RADIOLOGY & ADDITIONAL TESTS:  Imaging Personally Reviewed:  [ ] YES      Consultant(s) Notes Reviewed:  [x ] YES     Care Discussed with [ ] Consultants [X ] Patient [ ] Family  [x ]    [x ]  Other; RN CHIEF COMPLAINT/INTERVAL HISTORY:    Patient is a 89y old  Male who presents with a chief complaint of diarrhea (09 Nov 2017 14:30)      HPI:  89 y/o male pmh signif for HTN, HLD, CAD (s/p AICD placement after vt), DM-2 chronic urinary retention requiring chronic suprapubic cath, recent L3 fracture requiring stay in rehab, d/piper from rehab 4 days ago c/o 2 episodes of non bloody, watery stool per day for past 3 days. States other people in rehab also had diarrhea. Denies fever, n/v, abd pain, lightheadedness/dizziness, cp, sob. No urinary symptoms (has suprapubic catheter). Can walk with walker, otherwise usual state of health. (09 Nov 2017 14:30) + for c diff       SUBJECTIVE & OBJECTIVE: Pt seen and examined at bedside. diarrhea remains. Pt was given a bolus of fluids overnight d/t hypotension. pt afebrile. tolerating po     ROS:  CONSTITUTIONAL: no fever  NECK: No pain or stiffness  RESPIRATORY: No cough, wheezing, chills or hemoptysis; No shortness of breath  CARDIOVASCULAR: No chest pain, palpitations, dizziness, or leg swelling  GASTROINTESTINAL: No abdominal or epigastric pain. No nausea, vomiting, or hematemesis; POSITIVE diarrhea or constipation. No melena or hematochezia.  GENITOURINARY: No dysuria, frequency, hematuria, or incontinence  NEUROLOGICAL: No headaches, memory loss, loss of strength, numbness, or tremors  SKIN: No itching, burning, rashes, or lesions     Vital Signs Last 24 Hrs  T(C): 36.8 (12 Nov 2017 13:12), Max: 37.1 (12 Nov 2017 00:05)  T(F): 98.2 (12 Nov 2017 13:12), Max: 98.7 (12 Nov 2017 00:05)  HR: 68 (12 Nov 2017 13:12) (61 - 71)  BP: 117/73 (12 Nov 2017 13:12) (108/52 - 131/54)  BP(mean): --  RR: 18 (12 Nov 2017 13:12) (16 - 18)  SpO2: 96% (12 Nov 2017 13:12) (95% - 96%)    MEDICATIONS  (STANDING):  aspirin enteric coated 81 milliGRAM(s) Oral daily  atorvastatin 10 milliGRAM(s) Oral at bedtime  carvedilol 25 milliGRAM(s) Oral every 12 hours  dextrose 5% + sodium chloride 0.9%. 1000 milliLiter(s) (100 mL/Hr) IV Continuous <Continuous>  dextrose 5%. 1000 milliLiter(s) (50 mL/Hr) IV Continuous <Continuous>  dextrose 50% Injectable 12.5 Gram(s) IV Push once  dextrose 50% Injectable 25 Gram(s) IV Push once  dextrose 50% Injectable 25 Gram(s) IV Push once  finasteride 5 milliGRAM(s) Oral daily  furosemide    Tablet 20 milliGRAM(s) Oral daily  heparin  Injectable 5000 Unit(s) SubCutaneous every 12 hours  insulin lispro (HumaLOG) corrective regimen sliding scale   SubCutaneous three times a day before meals  insulin lispro (HumaLOG) corrective regimen sliding scale   SubCutaneous at bedtime  lactobacillus acidophilus 1 Tablet(s) Oral two times a day with meals  losartan 25 milliGRAM(s) Oral daily  metroNIDAZOLE  IVPB      metroNIDAZOLE  IVPB 500 milliGRAM(s) IV Intermittent every 8 hours  nystatin Cream 1 Application(s) Topical two times a day  sertraline 50 milliGRAM(s) Oral daily  vancomycin    Solution 500 milliGRAM(s) Oral every 6 hours    MEDICATIONS  (PRN):  acetaminophen   Tablet 650 milliGRAM(s) Oral every 6 hours PRN For Temp greater than 38 C (100.4 F)  dextrose Gel 1 Dose(s) Oral once PRN Blood Glucose LESS THAN 70 milliGRAM(s)/deciliter  glucagon  Injectable 1 milliGRAM(s) IntraMuscular once PRN Glucose LESS THAN 70 milligrams/deciliter          PHYSICAL EXAM:    GENERAL: NAD, well-groomed, well-developed  HEAD:  Atraumatic, Normocephalic  EYES: EOMI, PERRLA, conjunctiva and sclera clear  ENMT: Moist mucous membranes  NECK: Supple, No JVD  NERVOUS SYSTEM:  Alert & Oriented X3, Motor Strength 5/5 B/L upper and lower extremities; DTRs 2+ intact and symmetric  CHEST/LUNG: mild bibasilar crackles. no respiratory distress.   HEART: Regular rate and rhythm; No murmurs, rubs, or gallops  ABDOMEN: Soft, Nontender, Nondistended; Bowel sounds present  EXTREMITIES:  2+ Peripheral Pulses, No clubbing, cyanosis, or edema    LABS:                                                      8.0    10.2  )-----------( 152      ( 12 Nov 2017 06:27 )             24.0     11-12    141  |  112<H>  |  32<H>  ----------------------------<  237<H>  3.3<L>   |  19<L>  |  1.46<H>    Ca    7.8<L>      12 Nov 2017 06:27  Phos  1.5     11-12  Mg     1.6     11-12                          CAPILLARY BLOOD GLUCOSE      POCT Blood Glucose.: 130 mg/dL (10 Nov 2017 11:51)  POCT Blood Glucose.: 206 mg/dL (10 Nov 2017 07:52)  POCT Blood Glucose.: 209 mg/dL (09 Nov 2017 21:13)  POCT Blood Glucose.: 103 mg/dL (09 Nov 2017 17:43)  POCT Blood Glucose.: 52 mg/dL (09 Nov 2017 16:33)  POCT Blood Glucose.: 39 mg/dL (09 Nov 2017 16:31)      RECENT CULTURES:      RADIOLOGY & ADDITIONAL TESTS:  Imaging Personally Reviewed:  [ ] YES      Consultant(s) Notes Reviewed:  [x ] YES     Care Discussed with [ ] Consultants [X ] Patient [ ] Family  [x ]    [x ]  Other; RN

## 2017-11-12 NOTE — PROGRESS NOTE ADULT - PROBLEM SELECTOR PLAN 9
pt has mild bibasilar crackles, but in no distress and saturating fine. pt was hypotensive overnight and required a bolus of fluids   lasix 20 mg     BB and ace on hold d/t bp pt has mild bibasilar crackles, but in no distress and saturating fine. pt was hypotensive overnight and required a bolus of fluids   lasix 20 mg   ace   BB and on hold d/t bp

## 2017-11-13 DIAGNOSIS — E83.42 HYPOMAGNESEMIA: ICD-10-CM

## 2017-11-13 DIAGNOSIS — E87.6 HYPOKALEMIA: ICD-10-CM

## 2017-11-13 DIAGNOSIS — E83.39 OTHER DISORDERS OF PHOSPHORUS METABOLISM: ICD-10-CM

## 2017-11-13 LAB
ALBUMIN SERPL ELPH-MCNC: 2.3 G/DL — LOW (ref 3.3–5)
ALP SERPL-CCNC: 102 U/L — SIGNIFICANT CHANGE UP (ref 40–120)
ALT FLD-CCNC: 20 U/L — SIGNIFICANT CHANGE UP (ref 12–78)
ANION GAP SERPL CALC-SCNC: 11 MMOL/L — SIGNIFICANT CHANGE UP (ref 5–17)
AST SERPL-CCNC: 14 U/L — LOW (ref 15–37)
BILIRUB SERPL-MCNC: 0.5 MG/DL — SIGNIFICANT CHANGE UP (ref 0.2–1.2)
BUN SERPL-MCNC: 23 MG/DL — SIGNIFICANT CHANGE UP (ref 7–23)
CALCIUM SERPL-MCNC: 7.6 MG/DL — LOW (ref 8.5–10.1)
CHLORIDE SERPL-SCNC: 113 MMOL/L — HIGH (ref 96–108)
CO2 SERPL-SCNC: 19 MMOL/L — LOW (ref 22–31)
CREAT SERPL-MCNC: 1.25 MG/DL — SIGNIFICANT CHANGE UP (ref 0.5–1.3)
DIR ANTIGLOB POLYSPECIFIC INTERPRETATION: SIGNIFICANT CHANGE UP
GLUCOSE BLDC GLUCOMTR-MCNC: 149 MG/DL — HIGH (ref 70–99)
GLUCOSE BLDC GLUCOMTR-MCNC: 164 MG/DL — HIGH (ref 70–99)
GLUCOSE BLDC GLUCOMTR-MCNC: 208 MG/DL — HIGH (ref 70–99)
GLUCOSE BLDC GLUCOMTR-MCNC: 211 MG/DL — HIGH (ref 70–99)
GLUCOSE SERPL-MCNC: 191 MG/DL — HIGH (ref 70–99)
HCT VFR BLD CALC: 24.5 % — LOW (ref 39–50)
HGB BLD-MCNC: 8.1 G/DL — LOW (ref 13–17)
MAGNESIUM SERPL-MCNC: 1.5 MG/DL — LOW (ref 1.6–2.6)
MCHC RBC-ENTMCNC: 32.4 PG — SIGNIFICANT CHANGE UP (ref 27–34)
MCHC RBC-ENTMCNC: 33.1 GM/DL — SIGNIFICANT CHANGE UP (ref 32–36)
MCV RBC AUTO: 97.8 FL — SIGNIFICANT CHANGE UP (ref 80–100)
PHOSPHATE SERPL-MCNC: 1.5 MG/DL — LOW (ref 2.5–4.5)
PLATELET # BLD AUTO: 162 K/UL — SIGNIFICANT CHANGE UP (ref 150–400)
POTASSIUM SERPL-MCNC: 3.4 MMOL/L — LOW (ref 3.5–5.3)
POTASSIUM SERPL-SCNC: 3.4 MMOL/L — LOW (ref 3.5–5.3)
PROT SERPL-MCNC: 5.2 GM/DL — LOW (ref 6–8.3)
RBC # BLD: 2.5 M/UL — LOW (ref 4.2–5.8)
RBC # FLD: 13.7 % — SIGNIFICANT CHANGE UP (ref 11–15)
SODIUM SERPL-SCNC: 143 MMOL/L — SIGNIFICANT CHANGE UP (ref 135–145)
WBC # BLD: 9.4 K/UL — SIGNIFICANT CHANGE UP (ref 3.8–10.5)
WBC # FLD AUTO: 9.4 K/UL — SIGNIFICANT CHANGE UP (ref 3.8–10.5)

## 2017-11-13 PROCEDURE — 99233 SBSQ HOSP IP/OBS HIGH 50: CPT

## 2017-11-13 PROCEDURE — 86077 PHYS BLOOD BANK SERV XMATCH: CPT

## 2017-11-13 RX ORDER — MAGNESIUM SULFATE 500 MG/ML
2 VIAL (ML) INJECTION ONCE
Qty: 0 | Refills: 0 | Status: COMPLETED | OUTPATIENT
Start: 2017-11-13 | End: 2017-11-13

## 2017-11-13 RX ORDER — POTASSIUM PHOSPHATE, MONOBASIC POTASSIUM PHOSPHATE, DIBASIC 236; 224 MG/ML; MG/ML
15 INJECTION, SOLUTION INTRAVENOUS ONCE
Qty: 0 | Refills: 0 | Status: COMPLETED | OUTPATIENT
Start: 2017-11-13 | End: 2017-11-13

## 2017-11-13 RX ORDER — POTASSIUM CHLORIDE 20 MEQ
40 PACKET (EA) ORAL ONCE
Qty: 0 | Refills: 0 | Status: COMPLETED | OUTPATIENT
Start: 2017-11-13 | End: 2017-11-13

## 2017-11-13 RX ADMIN — NYSTATIN CREAM 1 APPLICATION(S): 100000 CREAM TOPICAL at 05:20

## 2017-11-13 RX ADMIN — HEPARIN SODIUM 5000 UNIT(S): 5000 INJECTION INTRAVENOUS; SUBCUTANEOUS at 17:58

## 2017-11-13 RX ADMIN — Medication 50 GRAM(S): at 11:08

## 2017-11-13 RX ADMIN — HEPARIN SODIUM 5000 UNIT(S): 5000 INJECTION INTRAVENOUS; SUBCUTANEOUS at 05:20

## 2017-11-13 RX ADMIN — Medication 20 MILLIGRAM(S): at 05:20

## 2017-11-13 RX ADMIN — FINASTERIDE 5 MILLIGRAM(S): 5 TABLET, FILM COATED ORAL at 12:05

## 2017-11-13 RX ADMIN — Medication 100 MILLIGRAM(S): at 13:34

## 2017-11-13 RX ADMIN — NYSTATIN CREAM 1 APPLICATION(S): 100000 CREAM TOPICAL at 17:58

## 2017-11-13 RX ADMIN — SODIUM CHLORIDE 100 MILLILITER(S): 9 INJECTION, SOLUTION INTRAVENOUS at 21:38

## 2017-11-13 RX ADMIN — Medication 4: at 12:05

## 2017-11-13 RX ADMIN — Medication 500 MILLIGRAM(S): at 12:05

## 2017-11-13 RX ADMIN — Medication 4: at 07:48

## 2017-11-13 RX ADMIN — SERTRALINE 50 MILLIGRAM(S): 25 TABLET, FILM COATED ORAL at 12:05

## 2017-11-13 RX ADMIN — Medication 2: at 16:23

## 2017-11-13 RX ADMIN — Medication 500 MILLIGRAM(S): at 05:25

## 2017-11-13 RX ADMIN — LOSARTAN POTASSIUM 25 MILLIGRAM(S): 100 TABLET, FILM COATED ORAL at 05:20

## 2017-11-13 RX ADMIN — Medication 100 MILLIGRAM(S): at 05:20

## 2017-11-13 RX ADMIN — SODIUM CHLORIDE 100 MILLILITER(S): 9 INJECTION, SOLUTION INTRAVENOUS at 05:20

## 2017-11-13 RX ADMIN — Medication 40 MILLIEQUIVALENT(S): at 11:08

## 2017-11-13 RX ADMIN — POTASSIUM PHOSPHATE, MONOBASIC POTASSIUM PHOSPHATE, DIBASIC 63.75 MILLIMOLE(S): 236; 224 INJECTION, SOLUTION INTRAVENOUS at 12:05

## 2017-11-13 RX ADMIN — Medication 500 MILLIGRAM(S): at 17:58

## 2017-11-13 RX ADMIN — Medication 100 MILLIGRAM(S): at 21:38

## 2017-11-13 RX ADMIN — ATORVASTATIN CALCIUM 10 MILLIGRAM(S): 80 TABLET, FILM COATED ORAL at 21:38

## 2017-11-13 RX ADMIN — Medication 1 TABLET(S): at 17:58

## 2017-11-13 RX ADMIN — Medication 1 TABLET(S): at 08:12

## 2017-11-13 RX ADMIN — Medication 81 MILLIGRAM(S): at 12:05

## 2017-11-13 NOTE — PROGRESS NOTE ADULT - PROBLEM SELECTOR PLAN 9
pt had drop in hemoglobin 9.7 to 8.5 to 8.o. baseline is 10-11   possibly d.t to hemodilution.  guaiac negative.    will repeat again . and monitor pt had drop in hemoglobin 9.7 to 8.5 to 8.o. baseline is 10-11   possibly d.t to hemodilution.  guaiac negative times one pre my colleague    will repeat again . and monitor hgb

## 2017-11-13 NOTE — PROGRESS NOTE ADULT - PROBLEM SELECTOR PLAN 10
pt has mild bibasilar crackles, but in no distress and saturating fine. pt was hypotensive overnight and required a bolus of fluids   lasix 20 mg   ace   BB and on hold d/t bp lasix 20 mg   ace   BB and on hold d/t bp lasix 20 mg   acei   BB and on hold d/t bp

## 2017-11-13 NOTE — PROGRESS NOTE ADULT - PROBLEM SELECTOR PLAN 7
renew home meds  Pt has suprapubic cath that needs to be changed as its been a month. Will have urology change when diarrhea improves more. renew home meds  Pt has suprapubic cath that needs to be changed  can be changed at next appropriate date or before discharge.

## 2017-11-13 NOTE — PROGRESS NOTE ADULT - SUBJECTIVE AND OBJECTIVE BOX
Pt claims that diarrhea persists but decreasing  +C diff      MEDICATIONS  (STANDING):  aspirin enteric coated 81 milliGRAM(s) Oral daily  atorvastatin 10 milliGRAM(s) Oral at bedtime  dextrose 5% + sodium chloride 0.9%. 1000 milliLiter(s) (100 mL/Hr) IV Continuous <Continuous>  dextrose 5%. 1000 milliLiter(s) (50 mL/Hr) IV Continuous <Continuous>  dextrose 50% Injectable 12.5 Gram(s) IV Push once  dextrose 50% Injectable 25 Gram(s) IV Push once  dextrose 50% Injectable 25 Gram(s) IV Push once  finasteride 5 milliGRAM(s) Oral daily  furosemide    Tablet 20 milliGRAM(s) Oral daily  heparin  Injectable 5000 Unit(s) SubCutaneous every 12 hours  insulin lispro (HumaLOG) corrective regimen sliding scale   SubCutaneous three times a day before meals  insulin lispro (HumaLOG) corrective regimen sliding scale   SubCutaneous at bedtime  lactobacillus acidophilus 1 Tablet(s) Oral two times a day with meals  losartan 25 milliGRAM(s) Oral daily  metroNIDAZOLE  IVPB      metroNIDAZOLE  IVPB 500 milliGRAM(s) IV Intermittent every 8 hours  nystatin Cream 1 Application(s) Topical two times a day  sertraline 50 milliGRAM(s) Oral daily  vancomycin    Solution 500 milliGRAM(s) Oral every 6 hours    MEDICATIONS  (PRN):  acetaminophen   Tablet 650 milliGRAM(s) Oral every 6 hours PRN For Temp greater than 38 C (100.4 F)  dextrose Gel 1 Dose(s) Oral once PRN Blood Glucose LESS THAN 70 milliGRAM(s)/deciliter  glucagon  Injectable 1 milliGRAM(s) IntraMuscular once PRN Glucose LESS THAN 70 milligrams/deciliter      Allergies    Cipro I.V. (Other)  indomethacin (Other)  Keflex (Unknown)  Macrobid (Unknown)  Plavix (Diarrhea; Other; Rash)  pravastatin (Unknown)  sulfamethoxazole (Unknown)    Intolerances        Vital Signs Last 24 Hrs  T(C): 37.1 (13 Nov 2017 04:57), Max: 37.1 (13 Nov 2017 04:57)  T(F): 98.7 (13 Nov 2017 04:57), Max: 98.7 (13 Nov 2017 04:57)  HR: 72 (13 Nov 2017 04:57) (63 - 72)  BP: 139/62 (13 Nov 2017 04:57) (115/51 - 139/62)  BP(mean): --  RR: 19 (13 Nov 2017 04:57) (18 - 19)  SpO2: 96% (13 Nov 2017 04:57) (96% - 96%)    PHYSICAL EXAM:  General: NAD.  CVS: S1, S2  Chest: air entry bilaterally present  Abd: BS present, soft, non-tender      LABS:                        8.1    9.4   )-----------( 162      ( 13 Nov 2017 06:27 )             24.5     11-13    143  |  113<H>  |  23  ----------------------------<  191<H>  3.4<L>   |  19<L>  |  1.25    Ca    7.6<L>      13 Nov 2017 06:27  Phos  1.5     11-13  Mg     1.5     11-13    TPro  5.2<L>  /  Alb  2.3<L>  /  TBili  0.5  /  DBili  x   /  AST  14<L>  /  ALT  20  /  AlkPhos  102  11-13        On IV Flagyl and PO Vanco  cont diet  If diarrhea persists more than another 2-3 days would consider adding DIFICID

## 2017-11-13 NOTE — PROGRESS NOTE ADULT - SUBJECTIVE AND OBJECTIVE BOX
CHIEF COMPLAINT/INTERVAL HISTORY:    Patient is a 89y old  Male who presents with a chief complaint of diarrhea (09 Nov 2017 14:30)      HPI:  89 y/o male pmh signif for HTN, HLD, CAD (s/p AICD placement after vt), DM-2 chronic urinary retention requiring chronic suprapubic cath, recent L3 fracture requiring stay in rehab, d/piper from rehab 4 days ago c/o 2 episodes of non bloody, watery stool per day for past 3 days. States other people in rehab also had diarrhea. Denies fever, n/v, abd pain, lightheadedness/dizziness, cp, sob. No urinary symptoms (has suprapubic catheter). Can walk with walker, otherwise usual state of health. (09 Nov 2017 14:30) + for c diff       SUBJECTIVE & OBJECTIVE: Pt seen and examined at bedside. diarrhea remains. Pt was given a bolus of fluids overnight d/t hypotension. pt afebrile. tolerating po     ROS:  CONSTITUTIONAL: no fever  NECK: No pain or stiffness  RESPIRATORY: No cough, wheezing, chills or hemoptysis; No shortness of breath  CARDIOVASCULAR: No chest pain, palpitations, dizziness, or leg swelling  GASTROINTESTINAL: No abdominal or epigastric pain. No nausea, vomiting, or hematemesis; POSITIVE diarrhea or constipation. No melena or hematochezia.  GENITOURINARY: No dysuria, frequency, hematuria, or incontinence  NEUROLOGICAL: No headaches, memory loss, loss of strength, numbness, or tremors  SKIN: No itching, burning, rashes, or lesions     MEDICATIONS  (STANDING):  aspirin enteric coated 81 milliGRAM(s) Oral daily  atorvastatin 10 milliGRAM(s) Oral at bedtime  dextrose 5% + sodium chloride 0.9%. 1000 milliLiter(s) (100 mL/Hr) IV Continuous <Continuous>  dextrose 5%. 1000 milliLiter(s) (50 mL/Hr) IV Continuous <Continuous>  dextrose 50% Injectable 12.5 Gram(s) IV Push once  dextrose 50% Injectable 25 Gram(s) IV Push once  dextrose 50% Injectable 25 Gram(s) IV Push once  finasteride 5 milliGRAM(s) Oral daily  furosemide    Tablet 20 milliGRAM(s) Oral daily  heparin  Injectable 5000 Unit(s) SubCutaneous every 12 hours  insulin lispro (HumaLOG) corrective regimen sliding scale   SubCutaneous three times a day before meals  insulin lispro (HumaLOG) corrective regimen sliding scale   SubCutaneous at bedtime  lactobacillus acidophilus 1 Tablet(s) Oral two times a day with meals  losartan 25 milliGRAM(s) Oral daily  magnesium sulfate  IVPB 2 Gram(s) IV Intermittent once  metroNIDAZOLE  IVPB      metroNIDAZOLE  IVPB 500 milliGRAM(s) IV Intermittent every 8 hours  nystatin Cream 1 Application(s) Topical two times a day  potassium chloride    Tablet ER 40 milliEquivalent(s) Oral once  potassium phosphate IVPB 15 milliMole(s) IV Intermittent once  sertraline 50 milliGRAM(s) Oral daily  vancomycin    Solution 500 milliGRAM(s) Oral every 6 hours    MEDICATIONS  (PRN):  acetaminophen   Tablet 650 milliGRAM(s) Oral every 6 hours PRN For Temp greater than 38 C (100.4 F)  dextrose Gel 1 Dose(s) Oral once PRN Blood Glucose LESS THAN 70 milliGRAM(s)/deciliter  glucagon  Injectable 1 milliGRAM(s) IntraMuscular once PRN Glucose LESS THAN 70 milligrams/deciliter    Vital Signs Last 24 Hrs  T(C): 37.1 (13 Nov 2017 04:57), Max: 37.1 (13 Nov 2017 04:57)  T(F): 98.7 (13 Nov 2017 04:57), Max: 98.7 (13 Nov 2017 04:57)  HR: 72 (13 Nov 2017 04:57) (63 - 72)  BP: 139/62 (13 Nov 2017 04:57) (115/51 - 139/62)  BP(mean): --  RR: 19 (13 Nov 2017 04:57) (18 - 19)  SpO2: 96% (13 Nov 2017 04:57) (96% - 96%)      PHYSICAL EXAM:    GENERAL: NAD, well-groomed, well-developed  HEAD:  Atraumatic, Normocephalic  EYES: EOMI, PERRLA, conjunctiva and sclera clear  ENMT: Moist mucous membranes  NECK: Supple, No JVD  NERVOUS SYSTEM:  Alert & Oriented X3, Motor Strength 5/5 B/L upper and lower extremities; DTRs 2+ intact and symmetric  CHEST/LUNG: mild bibasilar crackles. no respiratory distress.   HEART: Regular rate and rhythm; No murmurs, rubs, or gallops  ABDOMEN: Soft, Nontender, Nondistended; Bowel sounds present  EXTREMITIES:  2+ Peripheral Pulses, No clubbing, cyanosis, or edema    LABS:                                                    8.1    9.4   )-----------( 162      ( 13 Nov 2017 06:27 )             24.5   11-13    143  |  113<H>  |  23  ----------------------------<  191<H>  3.4<L>   |  19<L>  |  1.25    Ca    7.6<L>      13 Nov 2017 06:27  Phos  1.5     11-13  Mg     1.5     11-13    TPro  5.2<L>  /  Alb  2.3<L>  /  TBili  0.5  /  DBili  x   /  AST  14<L>  /  ALT  20  /  AlkPhos  102  11-13                CAPILLARY BLOOD GLUCOSE      POCT Blood Glucose.: 211 mg/dL (13 Nov 2017 07:46)  POCT Blood Glucose.: 240 mg/dL (12 Nov 2017 21:26)  POCT Blood Glucose.: 156 mg/dL (12 Nov 2017 15:57)  POCT Blood Glucose.: 251 mg/dL (12 Nov 2017 11:06)      RECENT CULTURES:      RADIOLOGY & ADDITIONAL TESTS:  Imaging Personally Reviewed:  [ ] YES      Consultant(s) Notes Reviewed:  [x ] YES     Care Discussed with [ ] Consultants [X ] Patient [ ] Family  [x ]    [x ]  Other; RN CHIEF COMPLAINT/INTERVAL HISTORY:    Patient is a 89y old  Male who presents with a chief complaint of diarrhea (09 Nov 2017 14:30)      HPI:  89 y/o male pmh signif for HTN, HLD, CAD (s/p AICD placement after vt), DM-2 chronic urinary retention requiring chronic suprapubic cath, recent L3 fracture requiring stay in rehab, d/piper from rehab 4 days ago c/o 2 episodes of non bloody, watery stool per day for past 3 days. States other people in rehab also had diarrhea. Denies fever, n/v, abd pain, lightheadedness/dizziness, cp, sob. No urinary symptoms (has suprapubic catheter). Can walk with walker, otherwise usual state of health. (09 Nov 2017 14:30) + for c diff       SUBJECTIVE & OBJECTIVE: Pt seen and examined at bedside. diarrhea remains. Pt was given a bolus of fluids overnight d/t hypotension. pt afebrile. tolerating po     ROS:  CONSTITUTIONAL: no fever  NECK: No pain or stiffness  RESPIRATORY: No cough, wheezing, chills or hemoptysis; No shortness of breath  CARDIOVASCULAR: No chest pain, palpitations, dizziness, or leg swelling  GASTROINTESTINAL: No abdominal or epigastric pain. No nausea, vomiting, or hematemesis; POSITIVE diarrhea or constipation. No melena or hematochezia.  GENITOURINARY: No dysuria, frequency, hematuria, or incontinence  NEUROLOGICAL: No headaches, memory loss, loss of strength, numbness, or tremors  SKIN: No itching, burning, rashes, or lesions     MEDICATIONS  (STANDING):  aspirin enteric coated 81 milliGRAM(s) Oral daily  atorvastatin 10 milliGRAM(s) Oral at bedtime  dextrose 5% + sodium chloride 0.9%. 1000 milliLiter(s) (100 mL/Hr) IV Continuous <Continuous>  dextrose 5%. 1000 milliLiter(s) (50 mL/Hr) IV Continuous <Continuous>  dextrose 50% Injectable 12.5 Gram(s) IV Push once  dextrose 50% Injectable 25 Gram(s) IV Push once  dextrose 50% Injectable 25 Gram(s) IV Push once  finasteride 5 milliGRAM(s) Oral daily  furosemide    Tablet 20 milliGRAM(s) Oral daily  heparin  Injectable 5000 Unit(s) SubCutaneous every 12 hours  insulin lispro (HumaLOG) corrective regimen sliding scale   SubCutaneous three times a day before meals  insulin lispro (HumaLOG) corrective regimen sliding scale   SubCutaneous at bedtime  lactobacillus acidophilus 1 Tablet(s) Oral two times a day with meals  losartan 25 milliGRAM(s) Oral daily  magnesium sulfate  IVPB 2 Gram(s) IV Intermittent once  metroNIDAZOLE  IVPB      metroNIDAZOLE  IVPB 500 milliGRAM(s) IV Intermittent every 8 hours  nystatin Cream 1 Application(s) Topical two times a day  potassium chloride    Tablet ER 40 milliEquivalent(s) Oral once  potassium phosphate IVPB 15 milliMole(s) IV Intermittent once  sertraline 50 milliGRAM(s) Oral daily  vancomycin    Solution 500 milliGRAM(s) Oral every 6 hours    MEDICATIONS  (PRN):  acetaminophen   Tablet 650 milliGRAM(s) Oral every 6 hours PRN For Temp greater than 38 C (100.4 F)  dextrose Gel 1 Dose(s) Oral once PRN Blood Glucose LESS THAN 70 milliGRAM(s)/deciliter  glucagon  Injectable 1 milliGRAM(s) IntraMuscular once PRN Glucose LESS THAN 70 milligrams/deciliter    Vital Signs Last 24 Hrs  T(C): 37.1 (13 Nov 2017 04:57), Max: 37.1 (13 Nov 2017 04:57)  T(F): 98.7 (13 Nov 2017 04:57), Max: 98.7 (13 Nov 2017 04:57)  HR: 72 (13 Nov 2017 04:57) (63 - 72)  BP: 139/62 (13 Nov 2017 04:57) (115/51 - 139/62)  BP(mean): --  RR: 19 (13 Nov 2017 04:57) (18 - 19)  SpO2: 96% (13 Nov 2017 04:57) (96% - 96%)      PHYSICAL EXAM:    GENERAL: NAD, well-groomed, well-developed, elderly male in bed   HEAD:  Atraumatic, Normocephalic  EYES: EOMI, PERRLA, conjunctiva and sclera clear  ENMT: Moist mucous membranes  NECK: Supple, No JVD  NERVOUS SYSTEM:  Alert & Oriented X3, Motor Strength 5/5 B/L upper and lower extremities; DTRs 2+ intact and symmetric  CHEST/LUNG: mild bibasilar crackles. no respiratory distress.   HEART: Regular rate and rhythm; No murmurs, rubs, or gallops  ABDOMEN: Soft, Nontender, Nondistended; Bowel sounds present + spc  EXTREMITIES:  2+ Peripheral Pulses, No clubbing, cyanosis, or edema    LABS:                                                    8.1    9.4   )-----------( 162      ( 13 Nov 2017 06:27 )             24.5   11-13    143  |  113<H>  |  23  ----------------------------<  191<H>  3.4<L>   |  19<L>  |  1.25    Ca    7.6<L>      13 Nov 2017 06:27  Phos  1.5     11-13  Mg     1.5     11-13    TPro  5.2<L>  /  Alb  2.3<L>  /  TBili  0.5  /  DBili  x   /  AST  14<L>  /  ALT  20  /  AlkPhos  102  11-13                CAPILLARY BLOOD GLUCOSE      POCT Blood Glucose.: 211 mg/dL (13 Nov 2017 07:46)  POCT Blood Glucose.: 240 mg/dL (12 Nov 2017 21:26)  POCT Blood Glucose.: 156 mg/dL (12 Nov 2017 15:57)  POCT Blood Glucose.: 251 mg/dL (12 Nov 2017 11:06)      RECENT CULTURES:      RADIOLOGY & ADDITIONAL TESTS:  Imaging Personally Reviewed:  [ ] YES      Consultant(s) Notes Reviewed:  [x ] YES     Care Discussed with [ ] Consultants [X ] Patient [ ] Family  [x ]    [x ]  Other; RN

## 2017-11-13 NOTE — PROGRESS NOTE ADULT - PROBLEM SELECTOR PLAN 1
secondary to cdiff. diarrhea improving. currently afebrile    continue vanco  continue flagyl  and probiotic secondary to cdiff. diarrhea improving. currently afebrile    continue vanco  continue flagyl and probiotic   will start Dificid on toady as pt since this am ha had 3 bm already .

## 2017-11-14 DIAGNOSIS — A04.72 ENTEROCOLITIS DUE TO CLOSTRIDIUM DIFFICILE, NOT SPECIFIED AS RECURRENT: ICD-10-CM

## 2017-11-14 LAB
ANION GAP SERPL CALC-SCNC: 9 MMOL/L — SIGNIFICANT CHANGE UP (ref 5–17)
BUN SERPL-MCNC: 16 MG/DL — SIGNIFICANT CHANGE UP (ref 7–23)
CALCIUM SERPL-MCNC: 7.4 MG/DL — LOW (ref 8.5–10.1)
CHLORIDE SERPL-SCNC: 113 MMOL/L — HIGH (ref 96–108)
CO2 SERPL-SCNC: 21 MMOL/L — LOW (ref 22–31)
CREAT SERPL-MCNC: 1.21 MG/DL — SIGNIFICANT CHANGE UP (ref 0.5–1.3)
GLUCOSE BLDC GLUCOMTR-MCNC: 182 MG/DL — HIGH (ref 70–99)
GLUCOSE BLDC GLUCOMTR-MCNC: 225 MG/DL — HIGH (ref 70–99)
GLUCOSE BLDC GLUCOMTR-MCNC: 240 MG/DL — HIGH (ref 70–99)
GLUCOSE BLDC GLUCOMTR-MCNC: 266 MG/DL — HIGH (ref 70–99)
GLUCOSE SERPL-MCNC: 188 MG/DL — HIGH (ref 70–99)
HCT VFR BLD CALC: 25.4 % — LOW (ref 39–50)
HGB BLD-MCNC: 8.2 G/DL — LOW (ref 13–17)
MAGNESIUM SERPL-MCNC: 1.5 MG/DL — LOW (ref 1.6–2.6)
MCHC RBC-ENTMCNC: 32.5 GM/DL — SIGNIFICANT CHANGE UP (ref 32–36)
MCHC RBC-ENTMCNC: 32.6 PG — SIGNIFICANT CHANGE UP (ref 27–34)
MCV RBC AUTO: 100.4 FL — HIGH (ref 80–100)
OB PNL STL: NEGATIVE — SIGNIFICANT CHANGE UP
PHOSPHATE SERPL-MCNC: 1.7 MG/DL — LOW (ref 2.5–4.5)
PLATELET # BLD AUTO: 168 K/UL — SIGNIFICANT CHANGE UP (ref 150–400)
POTASSIUM SERPL-MCNC: 3.3 MMOL/L — LOW (ref 3.5–5.3)
POTASSIUM SERPL-SCNC: 3.3 MMOL/L — LOW (ref 3.5–5.3)
RBC # BLD: 2.53 M/UL — LOW (ref 4.2–5.8)
RBC # FLD: 13.8 % — SIGNIFICANT CHANGE UP (ref 11–15)
SODIUM SERPL-SCNC: 143 MMOL/L — SIGNIFICANT CHANGE UP (ref 135–145)
WBC # BLD: 10.2 K/UL — SIGNIFICANT CHANGE UP (ref 3.8–10.5)
WBC # FLD AUTO: 10.2 K/UL — SIGNIFICANT CHANGE UP (ref 3.8–10.5)

## 2017-11-14 PROCEDURE — 99233 SBSQ HOSP IP/OBS HIGH 50: CPT

## 2017-11-14 PROCEDURE — 99223 1ST HOSP IP/OBS HIGH 75: CPT

## 2017-11-14 RX ORDER — POTASSIUM PHOSPHATE, MONOBASIC POTASSIUM PHOSPHATE, DIBASIC 236; 224 MG/ML; MG/ML
15 INJECTION, SOLUTION INTRAVENOUS EVERY 6 HOURS
Qty: 0 | Refills: 0 | Status: DISCONTINUED | OUTPATIENT
Start: 2017-11-14 | End: 2017-11-14

## 2017-11-14 RX ORDER — POTASSIUM PHOSPHATE, MONOBASIC POTASSIUM PHOSPHATE, DIBASIC 236; 224 MG/ML; MG/ML
15 INJECTION, SOLUTION INTRAVENOUS
Qty: 0 | Refills: 0 | Status: COMPLETED | OUTPATIENT
Start: 2017-11-14 | End: 2017-11-14

## 2017-11-14 RX ORDER — MAGNESIUM SULFATE 500 MG/ML
2 VIAL (ML) INJECTION ONCE
Qty: 0 | Refills: 0 | Status: COMPLETED | OUTPATIENT
Start: 2017-11-14 | End: 2017-11-14

## 2017-11-14 RX ORDER — POTASSIUM PHOSPHATE, MONOBASIC POTASSIUM PHOSPHATE, DIBASIC 236; 224 MG/ML; MG/ML
30 INJECTION, SOLUTION INTRAVENOUS ONCE
Qty: 0 | Refills: 0 | Status: DISCONTINUED | OUTPATIENT
Start: 2017-11-14 | End: 2017-11-14

## 2017-11-14 RX ORDER — POTASSIUM PHOSPHATE, MONOBASIC POTASSIUM PHOSPHATE, DIBASIC 236; 224 MG/ML; MG/ML
15 INJECTION, SOLUTION INTRAVENOUS ONCE
Qty: 0 | Refills: 0 | Status: COMPLETED | OUTPATIENT
Start: 2017-11-14 | End: 2017-11-14

## 2017-11-14 RX ORDER — FIDAXOMICIN 200 MG/5ML
200 GRANULE, FOR SUSPENSION ORAL
Qty: 0 | Refills: 0 | Status: DISCONTINUED | OUTPATIENT
Start: 2017-11-14 | End: 2017-11-26

## 2017-11-14 RX ORDER — POTASSIUM CHLORIDE 20 MEQ
40 PACKET (EA) ORAL ONCE
Qty: 0 | Refills: 0 | Status: DISCONTINUED | OUTPATIENT
Start: 2017-11-14 | End: 2017-11-15

## 2017-11-14 RX ADMIN — POTASSIUM PHOSPHATE, MONOBASIC POTASSIUM PHOSPHATE, DIBASIC 63.75 MILLIMOLE(S): 236; 224 INJECTION, SOLUTION INTRAVENOUS at 14:30

## 2017-11-14 RX ADMIN — FIDAXOMICIN 200 MILLIGRAM(S): 200 GRANULE, FOR SUSPENSION ORAL at 17:14

## 2017-11-14 RX ADMIN — SERTRALINE 50 MILLIGRAM(S): 25 TABLET, FILM COATED ORAL at 11:00

## 2017-11-14 RX ADMIN — NYSTATIN CREAM 1 APPLICATION(S): 100000 CREAM TOPICAL at 17:13

## 2017-11-14 RX ADMIN — Medication 4: at 16:37

## 2017-11-14 RX ADMIN — LOSARTAN POTASSIUM 25 MILLIGRAM(S): 100 TABLET, FILM COATED ORAL at 05:08

## 2017-11-14 RX ADMIN — Medication 1 TABLET(S): at 07:32

## 2017-11-14 RX ADMIN — HEPARIN SODIUM 5000 UNIT(S): 5000 INJECTION INTRAVENOUS; SUBCUTANEOUS at 05:08

## 2017-11-14 RX ADMIN — Medication 81 MILLIGRAM(S): at 11:00

## 2017-11-14 RX ADMIN — POTASSIUM PHOSPHATE, MONOBASIC POTASSIUM PHOSPHATE, DIBASIC 63.75 MILLIMOLE(S): 236; 224 INJECTION, SOLUTION INTRAVENOUS at 10:13

## 2017-11-14 RX ADMIN — SODIUM CHLORIDE 100 MILLILITER(S): 9 INJECTION, SOLUTION INTRAVENOUS at 10:13

## 2017-11-14 RX ADMIN — Medication 1 TABLET(S): at 16:38

## 2017-11-14 RX ADMIN — Medication 50 GRAM(S): at 10:13

## 2017-11-14 RX ADMIN — Medication 4: at 07:31

## 2017-11-14 RX ADMIN — Medication 20 MILLIGRAM(S): at 05:08

## 2017-11-14 RX ADMIN — FINASTERIDE 5 MILLIGRAM(S): 5 TABLET, FILM COATED ORAL at 11:00

## 2017-11-14 RX ADMIN — NYSTATIN CREAM 1 APPLICATION(S): 100000 CREAM TOPICAL at 05:09

## 2017-11-14 RX ADMIN — Medication 100 MILLIGRAM(S): at 13:06

## 2017-11-14 RX ADMIN — ATORVASTATIN CALCIUM 10 MILLIGRAM(S): 80 TABLET, FILM COATED ORAL at 21:24

## 2017-11-14 RX ADMIN — Medication 500 MILLIGRAM(S): at 00:25

## 2017-11-14 RX ADMIN — HEPARIN SODIUM 5000 UNIT(S): 5000 INJECTION INTRAVENOUS; SUBCUTANEOUS at 17:14

## 2017-11-14 RX ADMIN — Medication 4: at 10:56

## 2017-11-14 RX ADMIN — Medication 500 MILLIGRAM(S): at 05:13

## 2017-11-14 RX ADMIN — Medication 500 MILLIGRAM(S): at 11:00

## 2017-11-14 RX ADMIN — Medication 100 MILLIGRAM(S): at 05:08

## 2017-11-14 NOTE — PROGRESS NOTE ADULT - SUBJECTIVE AND OBJECTIVE BOX
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 States other people in rehab also had diarrhea. Denies fever, n/v, abd pain, lightheadedness/dizziness, cp, sob. No urinary symptoms (has suprapubic catheter). Can walk with walker, otherwise usual state of health. (09 Nov 2017 14:30) + for c diff       SUBJECTIVE & OBJECTIVE: Pt seen and examined at bedside. diarrhea remains.     MEDICATIONS  (STANDING):  aspirin enteric coated 81 milliGRAM(s) Oral daily  atorvastatin 10 milliGRAM(s) Oral at bedtime  dextrose 5% + sodium chloride 0.9%. 1000 milliLiter(s) (100 mL/Hr) IV Continuous <Continuous>  dextrose 5%. 1000 milliLiter(s) (50 mL/Hr) IV Continuous <Continuous>  dextrose 50% Injectable 12.5 Gram(s) IV Push once  dextrose 50% Injectable 25 Gram(s) IV Push once  dextrose 50% Injectable 25 Gram(s) IV Push once  finasteride 5 milliGRAM(s) Oral daily  furosemide    Tablet 20 milliGRAM(s) Oral daily  heparin  Injectable 5000 Unit(s) SubCutaneous every 12 hours  insulin lispro (HumaLOG) corrective regimen sliding scale   SubCutaneous three times a day before meals  insulin lispro (HumaLOG) corrective regimen sliding scale   SubCutaneous at bedtime  lactobacillus acidophilus 1 Tablet(s) Oral two times a day with meals  losartan 25 milliGRAM(s) Oral daily  magnesium sulfate  IVPB 2 Gram(s) IV Intermittent once  metroNIDAZOLE  IVPB      metroNIDAZOLE  IVPB 500 milliGRAM(s) IV Intermittent every 8 hours  nystatin Cream 1 Application(s) Topical two times a day  potassium chloride    Tablet ER 40 milliEquivalent(s) Oral once  potassium phosphate IVPB 15 milliMole(s) IV Intermittent every 6 hours  potassium phosphate IVPB 30 milliMole(s) IV Intermittent once  sertraline 50 milliGRAM(s) Oral daily  vancomycin    Solution 500 milliGRAM(s) Oral every 6 hours    MEDICATIONS  (PRN):  acetaminophen   Tablet 650 milliGRAM(s) Oral every 6 hours PRN For Temp greater than 38 C (100.4 F)  dextrose Gel 1 Dose(s) Oral once PRN Blood Glucose LESS THAN 70 milliGRAM(s)/deciliter  glucagon  Injectable 1 milliGRAM(s) IntraMuscular once PRN Glucose LESS THAN 70 milligrams/deciliter    ICU Vital Signs Last 24 Hrs  T(C): 36.9 (14 Nov 2017 05:07), Max: 36.9 (14 Nov 2017 00:06)  T(F): 98.4 (14 Nov 2017 05:07), Max: 98.4 (14 Nov 2017 00:06)  HR: 75 (14 Nov 2017 05:07) (62 - 78)  BP: 150/78 (14 Nov 2017 05:07) (123/54 - 150/78)  BP(mean): --  ABP: --  ABP(mean): --  RR: 18 (14 Nov 2017 05:07) (16 - 18)  SpO2: 94% (14 Nov 2017 05:07) (94% - 98%)      PHYSICAL EXAM:    GENERAL: NAD, well-groomed, well-developed, elderly male in bed   HEAD:  Atraumatic, Normocephalic  EYES: EOMI, PERRLA, conjunctiva and sclera clear  ENMT: Moist mucous membranes  NECK: Supple, No JVD  NERVOUS SYSTEM:  Alert & Oriented X3, Motor Strength 5/5 B/L upper and lower extremities; DTRs 2+ intact and symmetric  CHEST/LUNG: mild bibasilar crackles. no respiratory distress.   HEART: Regular rate and rhythm; No murmurs, rubs, or gallops  ABDOMEN: Soft, Nontender, Nondistended; Bowel sounds present + spc  EXTREMITIES:  2+ Peripheral Pulses, No clubbing, cyanosis, or edema    LABS:                                                                   8.2    10.2  )-----------( 168      ( 14 Nov 2017 08:12 )             25.4   11-14    143  |  113<H>  |  16  ----------------------------<  188<H>  3.3<L>   |  21<L>  |  1.21    Ca    7.4<L>      14 Nov 2017 08:12  Phos  1.7     11-14  Mg     1.5     11-14    TPro  5.2<L>  /  Alb  2.3<L>  /  TBili  0.5  /  DBili  x   /  AST  14<L>  /  ALT  20  /  AlkPhos  102  11-13            CAPILLARY BLOOD GLUCOSE      POCT Blood Glucose.: 240 mg/dL (14 Nov 2017 07:31)  POCT Blood Glucose.: 149 mg/dL (13 Nov 2017 21:43)  POCT Blood Glucose.: 164 mg/dL (13 Nov 2017 16:21)  POCT Blood Glucose.: 208 mg/dL (13 Nov 2017 11:15)    RECENT CULTURES:      RADIOLOGY & ADDITIONAL TESTS:  Imaging Personally Reviewed:  [ ] YES      Consultant(s) Notes Reviewed:  [x ] YES     Care Discussed with [ ] Consultants [X ] Patient [ ] Family  [x ]    [x ]  Other; RN

## 2017-11-14 NOTE — PROGRESS NOTE ADULT - PROBLEM SELECTOR PLAN 9
pt had drop in hemoglobin 9.7 to 8.5 to 8.o. baseline is 10-11   possibly d.t to hemodilution.  guaiac negative times one pre my colleague    will repeat again . and monitor hgb

## 2017-11-14 NOTE — PROGRESS NOTE ADULT - PROBLEM SELECTOR PLAN 1
secondary to cdiff. diarrhea improving. currently afebrile    continue vanco  continue flagyl and probiotic   d/w GI on 11/13/17 and dr. so to arrange for dificid approval .   not available at [facility].   will also consult ID on today

## 2017-11-14 NOTE — CHART NOTE - NSCHARTNOTEFT_GEN_A_CORE
Assessment: Pt with s/p profuse diarrhea on admission s/p 10 days. Pt diarrhea improving & continues Flagyl & probiotic & started Dificid. Pt tolerating current diet.     Factors impacting intake: [ ] none [ ] nausea  [ ] vomiting [ x] diarrhea [ ] constipation  [ x]chewing problems [ x] swallowing issues  [ ] other:     Diet Prescription: Diet, Dysphagia 1 Pureed-Nectar Consistency Fluid:   Consistent Carbohydrate {Evening Snack}  Supplement Feeding Modality:  Oral  Health Shake No Sugar Cans or Servings Per Day:  1       Frequency:  Two Times a day (11-10-17 @ 15:09) (200kcal & 7gm protein)    Intake: Po intake improved % meals fed by staff & pt's friend    Current Weight: Weight (kg): 54.4 (11-09 @ 07:04), Wt=62.5kg(11/13); no edema & reweigh=63kg(11/14); no edema  % Weight Change noted questionable wt gain 8.6kg(16%) x 5 days    Pertinent Medications: MEDICATIONS  (STANDING):  aspirin enteric coated 81 milliGRAM(s) Oral daily  atorvastatin 10 milliGRAM(s) Oral at bedtime  dextrose 5% + sodium chloride 0.9%. 1000 milliLiter(s) (100 mL/Hr) IV Continuous <Continuous>  dextrose 5%. 1000 milliLiter(s) (50 mL/Hr) IV Continuous <Continuous>  dextrose 50% Injectable 12.5 Gram(s) IV Push once  dextrose 50% Injectable 25 Gram(s) IV Push once  dextrose 50% Injectable 25 Gram(s) IV Push once  finasteride 5 milliGRAM(s) Oral daily  furosemide    Tablet 20 milliGRAM(s) Oral daily  heparin  Injectable 5000 Unit(s) SubCutaneous every 12 hours  insulin lispro (HumaLOG) corrective regimen sliding scale   SubCutaneous three times a day before meals  insulin lispro (HumaLOG) corrective regimen sliding scale   SubCutaneous at bedtime  lactobacillus acidophilus 1 Tablet(s) Oral two times a day with meals  losartan 25 milliGRAM(s) Oral daily  metroNIDAZOLE  IVPB      metroNIDAZOLE  IVPB 500 milliGRAM(s) IV Intermittent every 8 hours  nystatin Cream 1 Application(s) Topical two times a day  sertraline 50 milliGRAM(s) Oral daily  vancomycin    Solution 500 milliGRAM(s) Oral every 6 hours    MEDICATIONS  (PRN):  acetaminophen   Tablet 650 milliGRAM(s) Oral every 6 hours PRN For Temp greater than 38 C (100.4 F)  dextrose Gel 1 Dose(s) Oral once PRN Blood Glucose LESS THAN 70 milliGRAM(s)/deciliter  glucagon  Injectable 1 milliGRAM(s) IntraMuscular once PRN Glucose LESS THAN 70 milligrams/deciliter    Pertinent Labs: 11-14 Na143 mmol/L Glu 188 mg/dL<H> K+ 3.3 mmol/L<L> Cr  1.21 mg/dL BUN 16 mg/dL Phos 1.7 mg/dL<L> Alb n/a   PAB n/a        CAPILLARY BLOOD GLUCOSE      POCT Blood Glucose.: 240 mg/dL (14 Nov 2017 07:31)  POCT Blood Glucose.: 149 mg/dL (13 Nov 2017 21:43)  POCT Blood Glucose.: 164 mg/dL (13 Nov 2017 16:21)  POCT Blood Glucose.: 208 mg/dL (13 Nov 2017 11:15)    Skin:     Estimated Needs:   [ ] no change since previous assessment  [ ] recalculated:     Previous Nutrition Diagnosis:   [ ] Inadequate Energy Intake [ ]Inadequate Oral Intake [ ] Excessive Energy Intake   [ ] Underweight [ ] Increased Nutrient Needs [ ] Overweight/Obesity   [ ] Altered GI Function [ ] Unintended Weight Loss [ ] Food & Nutrition Related Knowledge Deficit [ ] Malnutrition     Nutrition Diagnosis is [ ] ongoing  [ ] resolved [ ] not applicable     New Nutrition Diagnosis: [ ] not applicable       Interventions:   Recommend  [ ] Change Diet To:  [ ] Nutrition Supplement  [ ] Nutrition Support  [ ] Other:     Monitoring and Evaluation:   [ ] PO intake [ x ] Tolerance to diet prescription [ x ] weights [ x ] labs[ x ] follow up per protocol  [ ] other: Assessment: Pt with s/p profuse diarrhea on admission s/p 10 days. Pt diarrhea improving & continues Flagyl & probiotic & started Dificid. Swallow evaluation 11/10 recommends puree/nectar thick liquids.  Pt tolerating current diet & po intake improving since 11/11.     Factors impacting intake: [ ] none [ ] nausea  [ ] vomiting [ x] diarrhea [ ] constipation  [ x]chewing problems [ x] swallowing issues  [x ] other: unable to feed self;l +generalized weakness    Diet Prescription: Diet, Dysphagia 1 Pureed-Nectar Consistency Fluid:   Consistent Carbohydrate {Evening Snack}  Supplement Feeding Modality:  Oral  Health Shake No Sugar Cans or Servings Per Day:  1       Frequency:  Two Times a day (11-10-17 @ 15:09) (200kcal & 7gm protein)    Intake: Po intake improved % meals fed by staff & pt's friend    Current Weight: Weight (kg): 54.4 (11-09 @ 07:04), Wt=62.5kg(11/13); no edema & reweigh=63kg(11/14); no edema  % Weight Change noted questionable wt gain 8.6kg(16%) x 5 days    Pertinent Medications: MEDICATIONS  (STANDING):  aspirin enteric coated 81 milliGRAM(s) Oral daily  atorvastatin 10 milliGRAM(s) Oral at bedtime  dextrose 5% + sodium chloride 0.9%. 1000 milliLiter(s) (100 mL/Hr) IV Continuous <Continuous>  dextrose 5%. 1000 milliLiter(s) (50 mL/Hr) IV Continuous <Continuous>  dextrose 50% Injectable 12.5 Gram(s) IV Push once  dextrose 50% Injectable 25 Gram(s) IV Push once  dextrose 50% Injectable 25 Gram(s) IV Push once  finasteride 5 milliGRAM(s) Oral daily  furosemide    Tablet 20 milliGRAM(s) Oral daily  heparin  Injectable 5000 Unit(s) SubCutaneous every 12 hours  insulin lispro (HumaLOG) corrective regimen sliding scale   SubCutaneous three times a day before meals  insulin lispro (HumaLOG) corrective regimen sliding scale   SubCutaneous at bedtime  lactobacillus acidophilus 1 Tablet(s) Oral two times a day with meals  losartan 25 milliGRAM(s) Oral daily  metroNIDAZOLE  IVPB      metroNIDAZOLE  IVPB 500 milliGRAM(s) IV Intermittent every 8 hours  nystatin Cream 1 Application(s) Topical two times a day  sertraline 50 milliGRAM(s) Oral daily  vancomycin    Solution 500 milliGRAM(s) Oral every 6 hours    MEDICATIONS  (PRN):  acetaminophen   Tablet 650 milliGRAM(s) Oral every 6 hours PRN For Temp greater than 38 C (100.4 F)  dextrose Gel 1 Dose(s) Oral once PRN Blood Glucose LESS THAN 70 milliGRAM(s)/deciliter  glucagon  Injectable 1 milliGRAM(s) IntraMuscular once PRN Glucose LESS THAN 70 milligrams/deciliter    Pertinent Labs: 11-14 Na143 mmol/L Glu 188 mg/dL<H> K+ 3.3 mmol/L<L> Cr  1.21 mg/dL BUN 16 mg/dL Phos 1.7 mg/dL<L> Alb n/a   PAB n/a        CAPILLARY BLOOD GLUCOSE      POCT Blood Glucose.: 240 mg/dL (14 Nov 2017 07:31)  POCT Blood Glucose.: 149 mg/dL (13 Nov 2017 21:43)  POCT Blood Glucose.: 164 mg/dL (13 Nov 2017 16:21)  POCT Blood Glucose.: 208 mg/dL (13 Nov 2017 11:15)    Skin: intact    Estimated Needs:   [x ] no change since previous assessment  [ ] recalculated:     Previous Nutrition Diagnosis:   [ ] Inadequate Energy Intake [ ]Inadequate Oral Intake [ ] Excessive Energy Intake   [ ] Underweight [ ] Increased Nutrient Needs [ ] Overweight/Obesity   [ ] Altered GI Function [ ] Unintended Weight Loss [ ] Food & Nutrition Related Knowledge Deficit [ ] Chronic Severe Malnutrition     Nutrition Diagnosis is [x ] ongoing  [ ] resolved [ ] not applicable     New Nutrition Diagnosis: [x ] not applicable     Interventions:   Recommend Continue Puree/nectar thick liquids/CCO with snack/diet health shake 2 x day  [ ] Change Diet To:  [ ] Nutrition Supplement  [ ] Nutrition Support  [ ] Other:     Monitoring and Evaluation:   [x ] PO intake [ x ] Tolerance to diet prescription [ x ] weights [ x ] labs[ x ] follow up per protocol  [ ] other: Assessment: Pt with s/p profuse diarrhea on admission s/p 10 days. Pt diarrhea improving & continues Flagyl & probiotic & started Dificid. Swallow evaluation 11/10 recommends puree/nectar thick liquids.  Pt tolerating current diet & po intake improving since 11/11.     Factors impacting intake: [ ] none [ ] nausea  [ ] vomiting [ x] diarrhea [ ] constipation  [ x]chewing problems [ x] swallowing issues  [x ] other: unable to feed self;l +generalized weakness    Diet Prescription: Diet, Dysphagia 1 Pureed-Nectar Consistency Fluid:   Consistent Carbohydrate {Evening Snack}  Supplement Feeding Modality:  Oral  Health Shake No Sugar Cans or Servings Per Day:  1       Frequency:  Two Times a day (11-10-17 @ 15:09) (200kcal & 7gm protein)    Intake: Po intake improved % meals fed by staff & pt's friend    Current Weight: Weight (kg): 54.4 (11-09 @ 07:04), Wt=62.5kg(11/13); no edema & reweigh=63kg(11/14); no edema  % Weight Change noted questionable wt gain 8.6kg(16%) x 5 days    Pertinent Medications: MEDICATIONS  (STANDING):  aspirin enteric coated 81 milliGRAM(s) Oral daily  atorvastatin 10 milliGRAM(s) Oral at bedtime  dextrose 5% + sodium chloride 0.9%. 1000 milliLiter(s) (100 mL/Hr) IV Continuous <Continuous>  dextrose 5%. 1000 milliLiter(s) (50 mL/Hr) IV Continuous <Continuous>  dextrose 50% Injectable 12.5 Gram(s) IV Push once  dextrose 50% Injectable 25 Gram(s) IV Push once  dextrose 50% Injectable 25 Gram(s) IV Push once  finasteride 5 milliGRAM(s) Oral daily  furosemide    Tablet 20 milliGRAM(s) Oral daily  heparin  Injectable 5000 Unit(s) SubCutaneous every 12 hours  insulin lispro (HumaLOG) corrective regimen sliding scale   SubCutaneous three times a day before meals  insulin lispro (HumaLOG) corrective regimen sliding scale   SubCutaneous at bedtime  lactobacillus acidophilus 1 Tablet(s) Oral two times a day with meals  losartan 25 milliGRAM(s) Oral daily  metroNIDAZOLE  IVPB      metroNIDAZOLE  IVPB 500 milliGRAM(s) IV Intermittent every 8 hours  nystatin Cream 1 Application(s) Topical two times a day  sertraline 50 milliGRAM(s) Oral daily  vancomycin    Solution 500 milliGRAM(s) Oral every 6 hours    MEDICATIONS  (PRN):  acetaminophen   Tablet 650 milliGRAM(s) Oral every 6 hours PRN For Temp greater than 38 C (100.4 F)  dextrose Gel 1 Dose(s) Oral once PRN Blood Glucose LESS THAN 70 milliGRAM(s)/deciliter  glucagon  Injectable 1 milliGRAM(s) IntraMuscular once PRN Glucose LESS THAN 70 milligrams/deciliter    Pertinent Labs: 11-14 Na143 mmol/L Glu 188 mg/dL<H> K+ 3.3 mmol/L<L> Cr  1.21 mg/dL BUN 16 mg/dL Phos 1.7 mg/dL<L> Alb n/a   PAB n/a        CAPILLARY BLOOD GLUCOSE      POCT Blood Glucose.: 240 mg/dL (14 Nov 2017 07:31)  POCT Blood Glucose.: 149 mg/dL (13 Nov 2017 21:43)  POCT Blood Glucose.: 164 mg/dL (13 Nov 2017 16:21)  POCT Blood Glucose.: 208 mg/dL (13 Nov 2017 11:15)    Skin: intact    Estimated Needs:   [x ] no change since previous assessment  [ ] recalculated:     Previous Nutrition Diagnosis:   [ ] Inadequate Energy Intake [ ]Inadequate Oral Intake [ ] Excessive Energy Intake   [ ] Underweight [ ] Increased Nutrient Needs [ ] Overweight/Obesity   [ ] Altered GI Function [ ] Unintended Weight Loss [ ] Food & Nutrition Related Knowledge Deficit [x ] Chronic Severe Malnutrition     Nutrition Diagnosis is [x ] ongoing  [ ] resolved [ ] not applicable     New Nutrition Diagnosis: [x ] not applicable     Interventions:   Recommend Continue Puree/nectar thick liquids/CCO with snack/diet health shake 2 x day  [ ] Change Diet To:  [ ] Nutrition Supplement  [ ] Nutrition Support  [ ] Other:     Monitoring and Evaluation:   [x ] PO intake [ x ] Tolerance to diet prescription [ x ] weights [ x ] labs[ x ] follow up per protocol  [ ] other:

## 2017-11-14 NOTE — PROGRESS NOTE ADULT - SUBJECTIVE AND OBJECTIVE BOX
Pt still having diarrhea; not responding to Flagyl and PO Vanco      MEDICATIONS  (STANDING):  aspirin enteric coated 81 milliGRAM(s) Oral daily  atorvastatin 10 milliGRAM(s) Oral at bedtime  dextrose 5% + sodium chloride 0.9%. 1000 milliLiter(s) (100 mL/Hr) IV Continuous <Continuous>  dextrose 5%. 1000 milliLiter(s) (50 mL/Hr) IV Continuous <Continuous>  dextrose 50% Injectable 12.5 Gram(s) IV Push once  dextrose 50% Injectable 25 Gram(s) IV Push once  dextrose 50% Injectable 25 Gram(s) IV Push once  finasteride 5 milliGRAM(s) Oral daily  furosemide    Tablet 20 milliGRAM(s) Oral daily  heparin  Injectable 5000 Unit(s) SubCutaneous every 12 hours  insulin lispro (HumaLOG) corrective regimen sliding scale   SubCutaneous three times a day before meals  insulin lispro (HumaLOG) corrective regimen sliding scale   SubCutaneous at bedtime  lactobacillus acidophilus 1 Tablet(s) Oral two times a day with meals  losartan 25 milliGRAM(s) Oral daily  metroNIDAZOLE  IVPB      metroNIDAZOLE  IVPB 500 milliGRAM(s) IV Intermittent every 8 hours  nystatin Cream 1 Application(s) Topical two times a day  potassium chloride    Tablet ER 40 milliEquivalent(s) Oral once  potassium phosphate IVPB 15 milliMole(s) IV Intermittent <User Schedule>  sertraline 50 milliGRAM(s) Oral daily  vancomycin    Solution 500 milliGRAM(s) Oral every 6 hours    MEDICATIONS  (PRN):  acetaminophen   Tablet 650 milliGRAM(s) Oral every 6 hours PRN For Temp greater than 38 C (100.4 F)  dextrose Gel 1 Dose(s) Oral once PRN Blood Glucose LESS THAN 70 milliGRAM(s)/deciliter  glucagon  Injectable 1 milliGRAM(s) IntraMuscular once PRN Glucose LESS THAN 70 milligrams/deciliter      Allergies    Cipro I.V. (Other)  indomethacin (Other)  Keflex (Unknown)  Macrobid (Unknown)  Plavix (Diarrhea; Other; Rash)  pravastatin (Unknown)  sulfamethoxazole (Unknown)    Intolerances        Vital Signs Last 24 Hrs  T(C): 36.7 (14 Nov 2017 11:43), Max: 36.9 (14 Nov 2017 00:06)  T(F): 98 (14 Nov 2017 11:43), Max: 98.4 (14 Nov 2017 00:06)  HR: 65 (14 Nov 2017 11:43) (65 - 78)  BP: 131/61 (14 Nov 2017 11:43) (129/67 - 150/78)  BP(mean): --  RR: 16 (14 Nov 2017 11:43) (16 - 18)  SpO2: 98% (14 Nov 2017 11:43) (94% - 98%)    PHYSICAL EXAM:  General: NAD.  CVS: S1, S2  Chest: air entry bilaterally present  Abd: BS present, soft, non-tender      LABS:                        8.2    10.2  )-----------( 168      ( 14 Nov 2017 08:12 )             25.4     11-14    143  |  113<H>  |  16  ----------------------------<  188<H>  3.3<L>   |  21<L>  |  1.21    Ca    7.4<L>      14 Nov 2017 08:12  Phos  1.7     11-14  Mg     1.5     11-14    TPro  5.2<L>  /  Alb  2.3<L>  /  TBili  0.5  /  DBili  x   /  AST  14<L>  /  ALT  20  /  AlkPhos  102  11-13      d/c flagyl and Vanco  Start dificid 200mg POP BID

## 2017-11-14 NOTE — PROGRESS NOTE ADULT - PROBLEM SELECTOR PLAN 7
renew home meds  Pt has suprapubic cath that needs to be changed  can be changed at next appropriate date or before discharge.

## 2017-11-15 LAB
ANION GAP SERPL CALC-SCNC: 10 MMOL/L — SIGNIFICANT CHANGE UP (ref 5–17)
BUN SERPL-MCNC: 14 MG/DL — SIGNIFICANT CHANGE UP (ref 7–23)
CALCIUM SERPL-MCNC: 7.8 MG/DL — LOW (ref 8.5–10.1)
CHLORIDE SERPL-SCNC: 112 MMOL/L — HIGH (ref 96–108)
CO2 SERPL-SCNC: 21 MMOL/L — LOW (ref 22–31)
CREAT SERPL-MCNC: 1.23 MG/DL — SIGNIFICANT CHANGE UP (ref 0.5–1.3)
GLUCOSE BLDC GLUCOMTR-MCNC: 148 MG/DL — HIGH (ref 70–99)
GLUCOSE BLDC GLUCOMTR-MCNC: 226 MG/DL — HIGH (ref 70–99)
GLUCOSE BLDC GLUCOMTR-MCNC: 260 MG/DL — HIGH (ref 70–99)
GLUCOSE BLDC GLUCOMTR-MCNC: 264 MG/DL — HIGH (ref 70–99)
GLUCOSE SERPL-MCNC: 227 MG/DL — HIGH (ref 70–99)
HCT VFR BLD CALC: 27.5 % — LOW (ref 39–50)
HGB BLD-MCNC: 9 G/DL — LOW (ref 13–17)
MAGNESIUM SERPL-MCNC: 1.6 MG/DL — SIGNIFICANT CHANGE UP (ref 1.6–2.6)
MCHC RBC-ENTMCNC: 32.3 PG — SIGNIFICANT CHANGE UP (ref 27–34)
MCHC RBC-ENTMCNC: 32.9 GM/DL — SIGNIFICANT CHANGE UP (ref 32–36)
MCV RBC AUTO: 98 FL — SIGNIFICANT CHANGE UP (ref 80–100)
PHOSPHATE SERPL-MCNC: 2.1 MG/DL — LOW (ref 2.5–4.5)
PLATELET # BLD AUTO: 204 K/UL — SIGNIFICANT CHANGE UP (ref 150–400)
POTASSIUM SERPL-MCNC: 3.6 MMOL/L — SIGNIFICANT CHANGE UP (ref 3.5–5.3)
POTASSIUM SERPL-SCNC: 3.6 MMOL/L — SIGNIFICANT CHANGE UP (ref 3.5–5.3)
RBC # BLD: 2.8 M/UL — LOW (ref 4.2–5.8)
RBC # FLD: 14.3 % — SIGNIFICANT CHANGE UP (ref 11–15)
SODIUM SERPL-SCNC: 143 MMOL/L — SIGNIFICANT CHANGE UP (ref 135–145)
WBC # BLD: 11.7 K/UL — HIGH (ref 3.8–10.5)
WBC # FLD AUTO: 11.7 K/UL — HIGH (ref 3.8–10.5)

## 2017-11-15 PROCEDURE — 99233 SBSQ HOSP IP/OBS HIGH 50: CPT

## 2017-11-15 RX ORDER — CARVEDILOL PHOSPHATE 80 MG/1
6.25 CAPSULE, EXTENDED RELEASE ORAL EVERY 12 HOURS
Qty: 0 | Refills: 0 | Status: DISCONTINUED | OUTPATIENT
Start: 2017-11-15 | End: 2017-11-28

## 2017-11-15 RX ORDER — POTASSIUM PHOSPHATE, MONOBASIC POTASSIUM PHOSPHATE, DIBASIC 236; 224 MG/ML; MG/ML
15 INJECTION, SOLUTION INTRAVENOUS ONCE
Qty: 0 | Refills: 0 | Status: COMPLETED | OUTPATIENT
Start: 2017-11-15 | End: 2017-11-15

## 2017-11-15 RX ORDER — MAGNESIUM SULFATE 500 MG/ML
1 VIAL (ML) INJECTION ONCE
Qty: 0 | Refills: 0 | Status: COMPLETED | OUTPATIENT
Start: 2017-11-15 | End: 2017-11-15

## 2017-11-15 RX ORDER — POTASSIUM CHLORIDE 20 MEQ
40 PACKET (EA) ORAL ONCE
Qty: 0 | Refills: 0 | Status: COMPLETED | OUTPATIENT
Start: 2017-11-15 | End: 2017-11-15

## 2017-11-15 RX ADMIN — SODIUM CHLORIDE 100 MILLILITER(S): 9 INJECTION, SOLUTION INTRAVENOUS at 18:47

## 2017-11-15 RX ADMIN — Medication 6: at 09:00

## 2017-11-15 RX ADMIN — SERTRALINE 50 MILLIGRAM(S): 25 TABLET, FILM COATED ORAL at 11:16

## 2017-11-15 RX ADMIN — Medication 100 GRAM(S): at 11:16

## 2017-11-15 RX ADMIN — CARVEDILOL PHOSPHATE 6.25 MILLIGRAM(S): 80 CAPSULE, EXTENDED RELEASE ORAL at 18:47

## 2017-11-15 RX ADMIN — FIDAXOMICIN 200 MILLIGRAM(S): 200 GRANULE, FOR SUSPENSION ORAL at 17:16

## 2017-11-15 RX ADMIN — NYSTATIN CREAM 1 APPLICATION(S): 100000 CREAM TOPICAL at 17:16

## 2017-11-15 RX ADMIN — SODIUM CHLORIDE 100 MILLILITER(S): 9 INJECTION, SOLUTION INTRAVENOUS at 21:34

## 2017-11-15 RX ADMIN — NYSTATIN CREAM 1 APPLICATION(S): 100000 CREAM TOPICAL at 06:14

## 2017-11-15 RX ADMIN — Medication 81 MILLIGRAM(S): at 11:16

## 2017-11-15 RX ADMIN — FINASTERIDE 5 MILLIGRAM(S): 5 TABLET, FILM COATED ORAL at 11:16

## 2017-11-15 RX ADMIN — LOSARTAN POTASSIUM 25 MILLIGRAM(S): 100 TABLET, FILM COATED ORAL at 06:13

## 2017-11-15 RX ADMIN — Medication 1 TABLET(S): at 16:56

## 2017-11-15 RX ADMIN — Medication 1 TABLET(S): at 09:53

## 2017-11-15 RX ADMIN — Medication 20 MILLIGRAM(S): at 06:13

## 2017-11-15 RX ADMIN — SODIUM CHLORIDE 100 MILLILITER(S): 9 INJECTION, SOLUTION INTRAVENOUS at 06:13

## 2017-11-15 RX ADMIN — Medication 6: at 11:15

## 2017-11-15 RX ADMIN — FIDAXOMICIN 200 MILLIGRAM(S): 200 GRANULE, FOR SUSPENSION ORAL at 06:13

## 2017-11-15 RX ADMIN — POTASSIUM PHOSPHATE, MONOBASIC POTASSIUM PHOSPHATE, DIBASIC 63.75 MILLIMOLE(S): 236; 224 INJECTION, SOLUTION INTRAVENOUS at 09:52

## 2017-11-15 RX ADMIN — ATORVASTATIN CALCIUM 10 MILLIGRAM(S): 80 TABLET, FILM COATED ORAL at 21:34

## 2017-11-15 RX ADMIN — HEPARIN SODIUM 5000 UNIT(S): 5000 INJECTION INTRAVENOUS; SUBCUTANEOUS at 06:13

## 2017-11-15 RX ADMIN — Medication 40 MILLIEQUIVALENT(S): at 14:13

## 2017-11-15 RX ADMIN — HEPARIN SODIUM 5000 UNIT(S): 5000 INJECTION INTRAVENOUS; SUBCUTANEOUS at 17:15

## 2017-11-15 NOTE — PROGRESS NOTE ADULT - SUBJECTIVE AND OBJECTIVE BOX
Patient is a 89y old  Male who presents with a chief complaint of diarrhea (09 Nov 2017 14:30)      INTERVAL HPI / OVERNIGHT EVENTS: diarrhea improving,2 soft stools today ,no fever or chills    MEDICATIONS  (STANDING):  aspirin enteric coated 81 milliGRAM(s) Oral daily  atorvastatin 10 milliGRAM(s) Oral at bedtime  carvedilol 6.25 milliGRAM(s) Oral every 12 hours  dextrose 5% + sodium chloride 0.9%. 1000 milliLiter(s) (100 mL/Hr) IV Continuous <Continuous>  dextrose 5%. 1000 milliLiter(s) (50 mL/Hr) IV Continuous <Continuous>  dextrose 50% Injectable 12.5 Gram(s) IV Push once  dextrose 50% Injectable 25 Gram(s) IV Push once  dextrose 50% Injectable 25 Gram(s) IV Push once  fidaxomicin 200 milliGRAM(s) Oral two times a day  finasteride 5 milliGRAM(s) Oral daily  furosemide    Tablet 20 milliGRAM(s) Oral daily  heparin  Injectable 5000 Unit(s) SubCutaneous every 12 hours  insulin lispro (HumaLOG) corrective regimen sliding scale   SubCutaneous three times a day before meals  insulin lispro (HumaLOG) corrective regimen sliding scale   SubCutaneous at bedtime  lactobacillus acidophilus 1 Tablet(s) Oral two times a day with meals  losartan 25 milliGRAM(s) Oral daily  nystatin Cream 1 Application(s) Topical two times a day  sertraline 50 milliGRAM(s) Oral daily    MEDICATIONS  (PRN):  acetaminophen   Tablet 650 milliGRAM(s) Oral every 6 hours PRN For Temp greater than 38 C (100.4 F)  dextrose Gel 1 Dose(s) Oral once PRN Blood Glucose LESS THAN 70 milliGRAM(s)/deciliter  glucagon  Injectable 1 milliGRAM(s) IntraMuscular once PRN Glucose LESS THAN 70 milligrams/deciliter      Vital Signs Last 24 Hrs  T(C): 36.6 (15 Nov 2017 18:18), Max: 36.6 (15 Nov 2017 18:18)  T(F): 97.9 (15 Nov 2017 18:18), Max: 97.9 (15 Nov 2017 18:18)  HR: 76 (15 Nov 2017 18:18) (66 - 80)  BP: 157/80 (15 Nov 2017 18:18) (147/72 - 161/78)  BP(mean): --  RR: 16 (15 Nov 2017 18:18) (16 - 19)  SpO2: 96% (15 Nov 2017 18:18) (94% - 96%)    PHYSICAL EXAM:  General :NAD  Constitutional:  well-groomed, well-developed  Respiratory: CTAB/L  Cardiovascular: S1 and S2, RRR, no M/G/R  Gastrointestinal: BS+, soft, NT/ND  Extremities: No peripheral edema  Vascular: 2+ peripheral pulses  Skin: No rashes      LABS:                        9.0    11.7  )-----------( 204      ( 15 Nov 2017 06:39 )             27.5     11-15    143  |  112<H>  |  14  ----------------------------<  227<H>  3.6   |  21<L>  |  1.23    Ca    7.8<L>      15 Nov 2017 06:39  Phos  2.1     11-15  Mg     1.6     11-15            MICROBIOLOGY:  RECENT CULTURES:        RADIOLOGY & ADDITIONAL STUDIES:

## 2017-11-15 NOTE — PROGRESS NOTE ADULT - PROBLEM SELECTOR PLAN 1
secondary to cdiff. diarrhea improving. currently afebrile    continue vanco  continue flagyl and probiotic  per GI 11/14/17 started Dificid  reviewed ID note .

## 2017-11-15 NOTE — PROGRESS NOTE ADULT - SUBJECTIVE AND OBJECTIVE BOX
Slight improvement with diarrhea - but still with loose stools  Dificid stated last night      MEDICATIONS  (STANDING):  aspirin enteric coated 81 milliGRAM(s) Oral daily  atorvastatin 10 milliGRAM(s) Oral at bedtime  dextrose 5% + sodium chloride 0.9%. 1000 milliLiter(s) (100 mL/Hr) IV Continuous <Continuous>  dextrose 5%. 1000 milliLiter(s) (50 mL/Hr) IV Continuous <Continuous>  dextrose 50% Injectable 12.5 Gram(s) IV Push once  dextrose 50% Injectable 25 Gram(s) IV Push once  dextrose 50% Injectable 25 Gram(s) IV Push once  fidaxomicin 200 milliGRAM(s) Oral two times a day  finasteride 5 milliGRAM(s) Oral daily  furosemide    Tablet 20 milliGRAM(s) Oral daily  heparin  Injectable 5000 Unit(s) SubCutaneous every 12 hours  insulin lispro (HumaLOG) corrective regimen sliding scale   SubCutaneous three times a day before meals  insulin lispro (HumaLOG) corrective regimen sliding scale   SubCutaneous at bedtime  lactobacillus acidophilus 1 Tablet(s) Oral two times a day with meals  losartan 25 milliGRAM(s) Oral daily  nystatin Cream 1 Application(s) Topical two times a day  sertraline 50 milliGRAM(s) Oral daily    MEDICATIONS  (PRN):  acetaminophen   Tablet 650 milliGRAM(s) Oral every 6 hours PRN For Temp greater than 38 C (100.4 F)  dextrose Gel 1 Dose(s) Oral once PRN Blood Glucose LESS THAN 70 milliGRAM(s)/deciliter  glucagon  Injectable 1 milliGRAM(s) IntraMuscular once PRN Glucose LESS THAN 70 milligrams/deciliter      Allergies    Cipro I.V. (Other)  indomethacin (Other)  Keflex (Unknown)  Macrobid (Unknown)  Plavix (Diarrhea; Other; Rash)  pravastatin (Unknown)  sulfamethoxazole (Unknown)    Intolerances        Vital Signs Last 24 Hrs  T(C): 36.3 (15 Nov 2017 10:45), Max: 36.6 (14 Nov 2017 18:34)  T(F): 97.3 (15 Nov 2017 10:45), Max: 97.9 (14 Nov 2017 18:34)  HR: 66 (15 Nov 2017 15:22) (66 - 80)  BP: 152/76 (15 Nov 2017 15:22) (134/76 - 161/78)  BP(mean): --  RR: 19 (15 Nov 2017 15:22) (16 - 19)  SpO2: 94% (15 Nov 2017 15:22) (94% - 96%)    PHYSICAL EXAM:  General: NAD.  CVS: S1, S2  Chest: air entry bilaterally present  Abd: BS present, soft, non-tender, +peg      LABS:                        9.0    11.7  )-----------( 204      ( 15 Nov 2017 06:39 )             27.5     11-15    143  |  112<H>  |  14  ----------------------------<  227<H>  3.6   |  21<L>  |  1.23    Ca    7.8<L>      15 Nov 2017 06:39  Phos  2.1     11-15  Mg     1.6     11-15        Continue present feedings  continue dificid  will observe

## 2017-11-15 NOTE — PROGRESS NOTE ADULT - SUBJECTIVE AND OBJECTIVE BOX
CHIEF COMPLAINT/INTERVAL HISTORY:    Patient is a 89y old  Male who presents with a chief complaint of diarrhea (09 Nov 2017 14:30)      HPI:  89 y/o male pmh signif for HTN, HLD, CAD (s/p AICD placement after vt), DM-2 chronic urinary retention requiring chronic suprapubic cath, recent L3 fracture requiring stay in rehab, d/piper from rehab 4 days ago c/o 2 episodes of non bloody, watery stool per day for past 3 days. States other people in rehab also had diarrhea. Denies fever, n/v, abd pain, lightheadedness/dizziness, cp, sob. No urinary symptoms (has suprapubic catheter). Can walk with walker, otherwise usual state of health. (09 Nov 2017 14:30) + for c diff       SUBJECTIVE & OBJECTIVE: Pt seen and examined at bedside. diarrhea remains however better    MEDICATIONS  (STANDING):  aspirin enteric coated 81 milliGRAM(s) Oral daily  atorvastatin 10 milliGRAM(s) Oral at bedtime  dextrose 5% + sodium chloride 0.9%. 1000 milliLiter(s) (100 mL/Hr) IV Continuous <Continuous>  dextrose 5%. 1000 milliLiter(s) (50 mL/Hr) IV Continuous <Continuous>  dextrose 50% Injectable 12.5 Gram(s) IV Push once  dextrose 50% Injectable 25 Gram(s) IV Push once  dextrose 50% Injectable 25 Gram(s) IV Push once  fidaxomicin 200 milliGRAM(s) Oral two times a day  finasteride 5 milliGRAM(s) Oral daily  furosemide    Tablet 20 milliGRAM(s) Oral daily  heparin  Injectable 5000 Unit(s) SubCutaneous every 12 hours  insulin lispro (HumaLOG) corrective regimen sliding scale   SubCutaneous three times a day before meals  insulin lispro (HumaLOG) corrective regimen sliding scale   SubCutaneous at bedtime  lactobacillus acidophilus 1 Tablet(s) Oral two times a day with meals  losartan 25 milliGRAM(s) Oral daily  nystatin Cream 1 Application(s) Topical two times a day  potassium chloride    Tablet ER 40 milliEquivalent(s) Oral once  sertraline 50 milliGRAM(s) Oral daily    MEDICATIONS  (PRN):  acetaminophen   Tablet 650 milliGRAM(s) Oral every 6 hours PRN For Temp greater than 38 C (100.4 F)  dextrose Gel 1 Dose(s) Oral once PRN Blood Glucose LESS THAN 70 milliGRAM(s)/deciliter  glucagon  Injectable 1 milliGRAM(s) IntraMuscular once PRN Glucose LESS THAN 70 milligrams/deciliter    Vital Signs Last 24 Hrs  T(C): 36.1 (15 Nov 2017 05:50), Max: 36.7 (14 Nov 2017 11:43)  T(F): 97 (15 Nov 2017 05:50), Max: 98 (14 Nov 2017 11:43)  HR: 80 (15 Nov 2017 05:50) (65 - 80)  BP: 161/78 (15 Nov 2017 05:50) (131/61 - 161/78)  BP(mean): --  RR: 16 (15 Nov 2017 05:50) (16 - 19)  SpO2: 95% (15 Nov 2017 05:50) (95% - 98%)    PHYSICAL EXAM:    GENERAL: NAD, well-groomed, well-developed, elderly male in bed   HEAD:  Atraumatic, Normocephalic  EYES: EOMI, PERRLA, conjunctiva and sclera clear  ENMT: Moist mucous membranes  NECK: Supple, No JVD  NERVOUS SYSTEM:  Alert & Oriented X3, Motor Strength 5/5 B/L upper and lower extremities; DTRs 2+ intact and symmetric  CHEST/LUNG: mild bibasilar crackles. no respiratory distress.   HEART: Regular rate and rhythm; No murmurs, rubs, or gallops  ABDOMEN: Soft, Nontender, Nondistended; Bowel sounds present + spc  EXTREMITIES:  2+ Peripheral Pulses, No clubbing, cyanosis, or edema    LABS:                                                  9.0    11.7  )-----------( 204      ( 15 Nov 2017 06:39 )             27.5   11-15    143  |  112<H>  |  14  ----------------------------<  227<H>  3.6   |  21<L>  |  1.23    Ca    7.8<L>      15 Nov 2017 06:39  Phos  2.1     11-15  Mg     1.6     11-15        CAPILLARY BLOOD GLUCOSE      POCT Blood Glucose.: 240 mg/dL (14 Nov 2017 07:31)  POCT Blood Glucose.: 149 mg/dL (13 Nov 2017 21:43)  POCT Blood Glucose.: 164 mg/dL (13 Nov 2017 16:21)  POCT Blood Glucose.: 208 mg/dL (13 Nov 2017 11:15)    RECENT CULTURES:      RADIOLOGY & ADDITIONAL TESTS:  Imaging Personally Reviewed:  [ ] YES      Consultant(s) Notes Reviewed:  [x ] YES     Care Discussed with [ ] Consultants [X ] Patient [ ] Family  [x ]    [x ]  Other; RN

## 2017-11-16 LAB
ANION GAP SERPL CALC-SCNC: 9 MMOL/L — SIGNIFICANT CHANGE UP (ref 5–17)
BUN SERPL-MCNC: 12 MG/DL — SIGNIFICANT CHANGE UP (ref 7–23)
CALCIUM SERPL-MCNC: 7.7 MG/DL — LOW (ref 8.5–10.1)
CHLORIDE SERPL-SCNC: 112 MMOL/L — HIGH (ref 96–108)
CO2 SERPL-SCNC: 22 MMOL/L — SIGNIFICANT CHANGE UP (ref 22–31)
CREAT SERPL-MCNC: 1.11 MG/DL — SIGNIFICANT CHANGE UP (ref 0.5–1.3)
GLUCOSE BLDC GLUCOMTR-MCNC: 197 MG/DL — HIGH (ref 70–99)
GLUCOSE BLDC GLUCOMTR-MCNC: 219 MG/DL — HIGH (ref 70–99)
GLUCOSE BLDC GLUCOMTR-MCNC: 235 MG/DL — HIGH (ref 70–99)
GLUCOSE BLDC GLUCOMTR-MCNC: 275 MG/DL — HIGH (ref 70–99)
GLUCOSE SERPL-MCNC: 213 MG/DL — HIGH (ref 70–99)
HCT VFR BLD CALC: 26.7 % — LOW (ref 39–50)
HGB BLD-MCNC: 8.3 G/DL — LOW (ref 13–17)
MAGNESIUM SERPL-MCNC: 1.6 MG/DL — SIGNIFICANT CHANGE UP (ref 1.6–2.6)
MCHC RBC-ENTMCNC: 31.2 GM/DL — LOW (ref 32–36)
MCHC RBC-ENTMCNC: 31.3 PG — SIGNIFICANT CHANGE UP (ref 27–34)
MCV RBC AUTO: 100.6 FL — HIGH (ref 80–100)
PHOSPHATE SERPL-MCNC: 1.9 MG/DL — LOW (ref 2.5–4.5)
PLATELET # BLD AUTO: 196 K/UL — SIGNIFICANT CHANGE UP (ref 150–400)
POTASSIUM SERPL-MCNC: 4 MMOL/L — SIGNIFICANT CHANGE UP (ref 3.5–5.3)
POTASSIUM SERPL-SCNC: 4 MMOL/L — SIGNIFICANT CHANGE UP (ref 3.5–5.3)
RBC # BLD: 2.65 M/UL — LOW (ref 4.2–5.8)
RBC # FLD: 13.6 % — SIGNIFICANT CHANGE UP (ref 11–15)
SODIUM SERPL-SCNC: 143 MMOL/L — SIGNIFICANT CHANGE UP (ref 135–145)
WBC # BLD: 10.8 K/UL — HIGH (ref 3.8–10.5)
WBC # FLD AUTO: 10.8 K/UL — HIGH (ref 3.8–10.5)

## 2017-11-16 PROCEDURE — 99233 SBSQ HOSP IP/OBS HIGH 50: CPT

## 2017-11-16 RX ORDER — POTASSIUM PHOSPHATE, MONOBASIC POTASSIUM PHOSPHATE, DIBASIC 236; 224 MG/ML; MG/ML
15 INJECTION, SOLUTION INTRAVENOUS ONCE
Qty: 0 | Refills: 0 | Status: COMPLETED | OUTPATIENT
Start: 2017-11-16 | End: 2017-11-16

## 2017-11-16 RX ORDER — MAGNESIUM SULFATE 500 MG/ML
1 VIAL (ML) INJECTION ONCE
Qty: 0 | Refills: 0 | Status: COMPLETED | OUTPATIENT
Start: 2017-11-16 | End: 2017-11-16

## 2017-11-16 RX ADMIN — Medication 1 TABLET(S): at 16:59

## 2017-11-16 RX ADMIN — Medication 2: at 16:59

## 2017-11-16 RX ADMIN — NYSTATIN CREAM 1 APPLICATION(S): 100000 CREAM TOPICAL at 19:37

## 2017-11-16 RX ADMIN — HEPARIN SODIUM 5000 UNIT(S): 5000 INJECTION INTRAVENOUS; SUBCUTANEOUS at 18:23

## 2017-11-16 RX ADMIN — POTASSIUM PHOSPHATE, MONOBASIC POTASSIUM PHOSPHATE, DIBASIC 63.75 MILLIMOLE(S): 236; 224 INJECTION, SOLUTION INTRAVENOUS at 13:59

## 2017-11-16 RX ADMIN — CARVEDILOL PHOSPHATE 6.25 MILLIGRAM(S): 80 CAPSULE, EXTENDED RELEASE ORAL at 05:26

## 2017-11-16 RX ADMIN — LOSARTAN POTASSIUM 25 MILLIGRAM(S): 100 TABLET, FILM COATED ORAL at 05:26

## 2017-11-16 RX ADMIN — SERTRALINE 50 MILLIGRAM(S): 25 TABLET, FILM COATED ORAL at 11:18

## 2017-11-16 RX ADMIN — Medication 1 TABLET(S): at 08:01

## 2017-11-16 RX ADMIN — Medication 6: at 11:17

## 2017-11-16 RX ADMIN — CARVEDILOL PHOSPHATE 6.25 MILLIGRAM(S): 80 CAPSULE, EXTENDED RELEASE ORAL at 18:23

## 2017-11-16 RX ADMIN — FIDAXOMICIN 200 MILLIGRAM(S): 200 GRANULE, FOR SUSPENSION ORAL at 18:23

## 2017-11-16 RX ADMIN — Medication 100 GRAM(S): at 12:46

## 2017-11-16 RX ADMIN — ATORVASTATIN CALCIUM 10 MILLIGRAM(S): 80 TABLET, FILM COATED ORAL at 21:43

## 2017-11-16 RX ADMIN — FINASTERIDE 5 MILLIGRAM(S): 5 TABLET, FILM COATED ORAL at 11:18

## 2017-11-16 RX ADMIN — FIDAXOMICIN 200 MILLIGRAM(S): 200 GRANULE, FOR SUSPENSION ORAL at 05:26

## 2017-11-16 RX ADMIN — Medication 4: at 08:01

## 2017-11-16 RX ADMIN — Medication 81 MILLIGRAM(S): at 11:21

## 2017-11-16 RX ADMIN — HEPARIN SODIUM 5000 UNIT(S): 5000 INJECTION INTRAVENOUS; SUBCUTANEOUS at 05:26

## 2017-11-16 RX ADMIN — Medication 20 MILLIGRAM(S): at 05:26

## 2017-11-16 RX ADMIN — NYSTATIN CREAM 1 APPLICATION(S): 100000 CREAM TOPICAL at 05:26

## 2017-11-16 NOTE — PROGRESS NOTE ADULT - PROBLEM SELECTOR PLAN 10
lasix 20 mg   acei   resumed coreg at 6.25 bid and not 25 bid for now and monitor bp. can uptitrate as needed

## 2017-11-16 NOTE — PROGRESS NOTE ADULT - PROBLEM SELECTOR PLAN 1
appears to be improving thus far at time of this writing only 1 bm since 7am   secondary to cdiff. diarrhea improving. currently afebrile    continue vanco  continue flagyl and probiotic  per GI 11/14/17 started Dificid

## 2017-11-16 NOTE — PROGRESS NOTE ADULT - SUBJECTIVE AND OBJECTIVE BOX
Pt doing much better on Dificid  diarrhea decreasing      MEDICATIONS  (STANDING):  aspirin enteric coated 81 milliGRAM(s) Oral daily  atorvastatin 10 milliGRAM(s) Oral at bedtime  carvedilol 6.25 milliGRAM(s) Oral every 12 hours  dextrose 5% + sodium chloride 0.9%. 1000 milliLiter(s) (100 mL/Hr) IV Continuous <Continuous>  dextrose 5%. 1000 milliLiter(s) (50 mL/Hr) IV Continuous <Continuous>  dextrose 50% Injectable 12.5 Gram(s) IV Push once  dextrose 50% Injectable 25 Gram(s) IV Push once  dextrose 50% Injectable 25 Gram(s) IV Push once  fidaxomicin 200 milliGRAM(s) Oral two times a day  finasteride 5 milliGRAM(s) Oral daily  furosemide    Tablet 20 milliGRAM(s) Oral daily  heparin  Injectable 5000 Unit(s) SubCutaneous every 12 hours  insulin lispro (HumaLOG) corrective regimen sliding scale   SubCutaneous three times a day before meals  insulin lispro (HumaLOG) corrective regimen sliding scale   SubCutaneous at bedtime  lactobacillus acidophilus 1 Tablet(s) Oral two times a day with meals  losartan 25 milliGRAM(s) Oral daily  nystatin Cream 1 Application(s) Topical two times a day  sertraline 50 milliGRAM(s) Oral daily    MEDICATIONS  (PRN):  acetaminophen   Tablet 650 milliGRAM(s) Oral every 6 hours PRN For Temp greater than 38 C (100.4 F)  dextrose Gel 1 Dose(s) Oral once PRN Blood Glucose LESS THAN 70 milliGRAM(s)/deciliter  glucagon  Injectable 1 milliGRAM(s) IntraMuscular once PRN Glucose LESS THAN 70 milligrams/deciliter      Allergies    Cipro I.V. (Other)  indomethacin (Other)  Keflex (Unknown)  Macrobid (Unknown)  Plavix (Diarrhea; Other; Rash)  pravastatin (Unknown)  sulfamethoxazole (Unknown)    Intolerances        Vital Signs Last 24 Hrs  T(C): 36.6 (16 Nov 2017 11:55), Max: 36.6 (15 Nov 2017 18:18)  T(F): 97.9 (16 Nov 2017 11:55), Max: 97.9 (15 Nov 2017 18:18)  HR: 71 (16 Nov 2017 11:55) (66 - 78)  BP: 148/71 (16 Nov 2017 11:55) (148/71 - 157/80)  BP(mean): --  RR: 16 (16 Nov 2017 11:55) (16 - 19)  SpO2: 97% (16 Nov 2017 11:55) (94% - 97%)    PHYSICAL EXAM:  General: NAD.  CVS: S1, S2  Chest: air entry bilaterally present  Abd: BS present, soft, non-tender      LABS:                        8.3    10.8  )-----------( 196      ( 16 Nov 2017 07:02 )             26.7     11-16    143  |  112<H>  |  12  ----------------------------<  213<H>  4.0   |  22  |  1.11    Ca    7.7<L>      16 Nov 2017 07:02  Phos  1.9     11-16  Mg     1.6     11-16        continue dificid

## 2017-11-16 NOTE — PROGRESS NOTE ADULT - SUBJECTIVE AND OBJECTIVE BOX
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 States other people in rehab also had diarrhea. Denies fever, n/v, abd pain, lightheadedness/dizziness, cp, sob. No urinary symptoms (has suprapubic catheter). Can walk with walker, otherwise usual state of health. (09 Nov 2017 14:30) + for c diff       SUBJECTIVE & OBJECTIVE: Pt seen and examined at bedside. diarrhea remains however better    MEDICATIONS  (STANDING):  aspirin enteric coated 81 milliGRAM(s) Oral daily  atorvastatin 10 milliGRAM(s) Oral at bedtime  carvedilol 6.25 milliGRAM(s) Oral every 12 hours  dextrose 5% + sodium chloride 0.9%. 1000 milliLiter(s) (100 mL/Hr) IV Continuous <Continuous>  dextrose 5%. 1000 milliLiter(s) (50 mL/Hr) IV Continuous <Continuous>  dextrose 50% Injectable 12.5 Gram(s) IV Push once  dextrose 50% Injectable 25 Gram(s) IV Push once  dextrose 50% Injectable 25 Gram(s) IV Push once  fidaxomicin 200 milliGRAM(s) Oral two times a day  finasteride 5 milliGRAM(s) Oral daily  furosemide    Tablet 20 milliGRAM(s) Oral daily  heparin  Injectable 5000 Unit(s) SubCutaneous every 12 hours  insulin lispro (HumaLOG) corrective regimen sliding scale   SubCutaneous three times a day before meals  insulin lispro (HumaLOG) corrective regimen sliding scale   SubCutaneous at bedtime  lactobacillus acidophilus 1 Tablet(s) Oral two times a day with meals  losartan 25 milliGRAM(s) Oral daily  nystatin Cream 1 Application(s) Topical two times a day  potassium phosphate IVPB 15 milliMole(s) IV Intermittent once  sertraline 50 milliGRAM(s) Oral daily    MEDICATIONS  (PRN):  acetaminophen   Tablet 650 milliGRAM(s) Oral every 6 hours PRN For Temp greater than 38 C (100.4 F)  dextrose Gel 1 Dose(s) Oral once PRN Blood Glucose LESS THAN 70 milliGRAM(s)/deciliter  glucagon  Injectable 1 milliGRAM(s) IntraMuscular once PRN Glucose LESS THAN 70 milligrams/deciliter    Vital Signs Last 24 Hrs  T(C): 36.6 (16 Nov 2017 05:22), Max: 36.6 (15 Nov 2017 18:18)  T(F): 97.8 (16 Nov 2017 05:22), Max: 97.9 (15 Nov 2017 18:18)  HR: 78 (16 Nov 2017 05:22) (66 - 78)  BP: 151/77 (16 Nov 2017 05:22) (150/80 - 157/80)  BP(mean): --  RR: 16 (16 Nov 2017 05:22) (16 - 19)  SpO2: 96% (16 Nov 2017 05:22) (94% - 96%)  PHYSICAL EXAM:    GENERAL: NAD, well-groomed, well-developed, elderly male in bed   HEAD:  Atraumatic, Normocephalic  EYES: EOMI, PERRLA, conjunctiva and sclera clear  ENMT: Moist mucous membranes  NECK: Supple, No JVD  NERVOUS SYSTEM:  Alert & Oriented X3, Motor Strength 5/5 B/L upper and lower extremities; DTRs 2+ intact and symmetric  CHEST/LUNG: mild bibasilar crackles. no respiratory distress.   HEART: Regular rate and rhythm; No murmurs, rubs, or gallops  ABDOMEN: Soft, Nontender, Nondistended; Bowel sounds present + spc  EXTREMITIES:  2+ Peripheral Pulses, No clubbing, cyanosis, or edema    LABS:                                              8.3    10.8  )-----------( 196      ( 16 Nov 2017 07:02 )             26.7   11-16    143  |  112<H>  |  12  ----------------------------<  213<H>  4.0   |  22  |  1.11    Ca    7.7<L>      16 Nov 2017 07:02  Phos  1.9     11-16  Mg     1.6     11-16         CAPILLARY BLOOD GLUCOSE      RECENT CULTURES:      RADIOLOGY & ADDITIONAL TESTS:  Imaging Personally Reviewed:  [ ] YES      Consultant(s) Notes Reviewed:  [x ] YES     Care Discussed with [ ] Consultants [X ] Patient [ ] Family  [x ]    [x ]  Other; RN

## 2017-11-17 LAB
FOLATE SERPL-MCNC: >20 NG/ML — SIGNIFICANT CHANGE UP (ref 4.8–24.2)
GLUCOSE BLDC GLUCOMTR-MCNC: 207 MG/DL — HIGH (ref 70–99)
GLUCOSE BLDC GLUCOMTR-MCNC: 235 MG/DL — HIGH (ref 70–99)
GLUCOSE BLDC GLUCOMTR-MCNC: 242 MG/DL — HIGH (ref 70–99)
GLUCOSE BLDC GLUCOMTR-MCNC: 254 MG/DL — HIGH (ref 70–99)
GLUCOSE BLDC GLUCOMTR-MCNC: 269 MG/DL — HIGH (ref 70–99)
GLUCOSE BLDC GLUCOMTR-MCNC: 292 MG/DL — HIGH (ref 70–99)
HCT VFR BLD CALC: 26.5 % — LOW (ref 39–50)
HGB BLD-MCNC: 8.7 G/DL — LOW (ref 13–17)
MAGNESIUM SERPL-MCNC: 1.6 MG/DL — SIGNIFICANT CHANGE UP (ref 1.6–2.6)
MCHC RBC-ENTMCNC: 32.3 PG — SIGNIFICANT CHANGE UP (ref 27–34)
MCHC RBC-ENTMCNC: 32.6 GM/DL — SIGNIFICANT CHANGE UP (ref 32–36)
MCV RBC AUTO: 98.8 FL — SIGNIFICANT CHANGE UP (ref 80–100)
PHOSPHATE SERPL-MCNC: 2.2 MG/DL — LOW (ref 2.5–4.5)
PLATELET # BLD AUTO: 218 K/UL — SIGNIFICANT CHANGE UP (ref 150–400)
RBC # BLD: 2.68 M/UL — LOW (ref 4.2–5.8)
RBC # FLD: 14 % — SIGNIFICANT CHANGE UP (ref 11–15)
VIT B12 SERPL-MCNC: 1252 PG/ML — HIGH (ref 243–894)
WBC # BLD: 11.4 K/UL — HIGH (ref 3.8–10.5)
WBC # FLD AUTO: 11.4 K/UL — HIGH (ref 3.8–10.5)

## 2017-11-17 PROCEDURE — 99233 SBSQ HOSP IP/OBS HIGH 50: CPT

## 2017-11-17 PROCEDURE — 99497 ADVNCD CARE PLAN 30 MIN: CPT

## 2017-11-17 PROCEDURE — 99498 ADVNCD CARE PLAN ADDL 30 MIN: CPT

## 2017-11-17 RX ORDER — POTASSIUM PHOSPHATE, MONOBASIC POTASSIUM PHOSPHATE, DIBASIC 236; 224 MG/ML; MG/ML
15 INJECTION, SOLUTION INTRAVENOUS ONCE
Qty: 0 | Refills: 0 | Status: COMPLETED | OUTPATIENT
Start: 2017-11-17 | End: 2017-11-17

## 2017-11-17 RX ORDER — BACITRACIN ZINC 500 UNIT/G
1 OINTMENT IN PACKET (EA) TOPICAL DAILY
Qty: 0 | Refills: 0 | Status: DISCONTINUED | OUTPATIENT
Start: 2017-11-17 | End: 2017-11-28

## 2017-11-17 RX ADMIN — Medication 81 MILLIGRAM(S): at 13:02

## 2017-11-17 RX ADMIN — Medication 1 TABLET(S): at 16:52

## 2017-11-17 RX ADMIN — FINASTERIDE 5 MILLIGRAM(S): 5 TABLET, FILM COATED ORAL at 12:58

## 2017-11-17 RX ADMIN — Medication 20 MILLIGRAM(S): at 07:02

## 2017-11-17 RX ADMIN — ATORVASTATIN CALCIUM 10 MILLIGRAM(S): 80 TABLET, FILM COATED ORAL at 22:06

## 2017-11-17 RX ADMIN — HEPARIN SODIUM 5000 UNIT(S): 5000 INJECTION INTRAVENOUS; SUBCUTANEOUS at 18:30

## 2017-11-17 RX ADMIN — SERTRALINE 50 MILLIGRAM(S): 25 TABLET, FILM COATED ORAL at 12:58

## 2017-11-17 RX ADMIN — CARVEDILOL PHOSPHATE 6.25 MILLIGRAM(S): 80 CAPSULE, EXTENDED RELEASE ORAL at 07:02

## 2017-11-17 RX ADMIN — Medication 4: at 07:51

## 2017-11-17 RX ADMIN — FIDAXOMICIN 200 MILLIGRAM(S): 200 GRANULE, FOR SUSPENSION ORAL at 18:29

## 2017-11-17 RX ADMIN — Medication 4: at 16:52

## 2017-11-17 RX ADMIN — HEPARIN SODIUM 5000 UNIT(S): 5000 INJECTION INTRAVENOUS; SUBCUTANEOUS at 07:02

## 2017-11-17 RX ADMIN — Medication 1 TABLET(S): at 08:53

## 2017-11-17 RX ADMIN — Medication 6: at 12:09

## 2017-11-17 RX ADMIN — POTASSIUM PHOSPHATE, MONOBASIC POTASSIUM PHOSPHATE, DIBASIC 63.75 MILLIMOLE(S): 236; 224 INJECTION, SOLUTION INTRAVENOUS at 12:58

## 2017-11-17 RX ADMIN — FIDAXOMICIN 200 MILLIGRAM(S): 200 GRANULE, FOR SUSPENSION ORAL at 07:02

## 2017-11-17 RX ADMIN — NYSTATIN CREAM 1 APPLICATION(S): 100000 CREAM TOPICAL at 18:10

## 2017-11-17 RX ADMIN — Medication 1 APPLICATION(S): at 16:51

## 2017-11-17 RX ADMIN — LOSARTAN POTASSIUM 25 MILLIGRAM(S): 100 TABLET, FILM COATED ORAL at 07:02

## 2017-11-17 RX ADMIN — NYSTATIN CREAM 1 APPLICATION(S): 100000 CREAM TOPICAL at 07:02

## 2017-11-17 RX ADMIN — CARVEDILOL PHOSPHATE 6.25 MILLIGRAM(S): 80 CAPSULE, EXTENDED RELEASE ORAL at 18:29

## 2017-11-17 NOTE — PROGRESS NOTE ADULT - SUBJECTIVE AND OBJECTIVE BOX
CHIEF COMPLAINT/INTERVAL HISTORY:    Patient is a 89y old  Male who presents with a chief complaint of diarrhea (09 Nov 2017 14:30)      HPI:  89 y/o male pmh signif for HTN, HLD, CAD (s/p AICD placement after vt), DM-2 chronic urinary retention requiring chronic suprapubic cath, recent L3 fracture requiring stay in rehab, d/piper from rehab 4 days ago c/o 2 episodes of non bloody, watery stool per day for past 3 days. States other people in rehab also had diarrhea. Denies fever, n/v, abd pain, lightheadedness/dizziness, cp, sob. No urinary symptoms (has suprapubic catheter). Can walk with walker, otherwise usual state of health. (09 Nov 2017 14:30) + for c diff       SUBJECTIVE & OBJECTIVE: Pt seen and examined at bedside. diarrhea remains however better    MEDICATIONS  (STANDING):  aspirin enteric coated 81 milliGRAM(s) Oral daily  atorvastatin 10 milliGRAM(s) Oral at bedtime  carvedilol 6.25 milliGRAM(s) Oral every 12 hours  dextrose 5% + sodium chloride 0.9%. 1000 milliLiter(s) (100 mL/Hr) IV Continuous <Continuous>  dextrose 5%. 1000 milliLiter(s) (50 mL/Hr) IV Continuous <Continuous>  dextrose 50% Injectable 12.5 Gram(s) IV Push once  dextrose 50% Injectable 25 Gram(s) IV Push once  dextrose 50% Injectable 25 Gram(s) IV Push once  fidaxomicin 200 milliGRAM(s) Oral two times a day  finasteride 5 milliGRAM(s) Oral daily  furosemide    Tablet 20 milliGRAM(s) Oral daily  heparin  Injectable 5000 Unit(s) SubCutaneous every 12 hours  insulin lispro (HumaLOG) corrective regimen sliding scale   SubCutaneous three times a day before meals  insulin lispro (HumaLOG) corrective regimen sliding scale   SubCutaneous at bedtime  lactobacillus acidophilus 1 Tablet(s) Oral two times a day with meals  losartan 25 milliGRAM(s) Oral daily  nystatin Cream 1 Application(s) Topical two times a day  potassium phosphate IVPB 15 milliMole(s) IV Intermittent once  sertraline 50 milliGRAM(s) Oral daily    MEDICATIONS  (PRN):  acetaminophen   Tablet 650 milliGRAM(s) Oral every 6 hours PRN For Temp greater than 38 C (100.4 F)  dextrose Gel 1 Dose(s) Oral once PRN Blood Glucose LESS THAN 70 milliGRAM(s)/deciliter  glucagon  Injectable 1 milliGRAM(s) IntraMuscular once PRN Glucose LESS THAN 70 milligrams/deciliter    Vital Signs Last 24 Hrs  T(C): 35.9 (17 Nov 2017 11:01), Max: 36.6 (16 Nov 2017 11:55)  T(F): 96.7 (17 Nov 2017 11:01), Max: 97.9 (16 Nov 2017 11:55)  HR: 68 (17 Nov 2017 11:01) (63 - 73)  BP: 148/65 (17 Nov 2017 11:01) (136/70 - 148/71)  BP(mean): --  RR: 18 (17 Nov 2017 11:01) (16 - 18)  SpO2: 100% (17 Nov 2017 11:01) (95% - 100%)      PHYSICAL EXAM:    GENERAL: NAD, well-groomed, well-developed, elderly male in bed   HEAD:  Atraumatic, Normocephalic  EYES: EOMI, PERRLA, conjunctiva and sclera clear  ENMT: Moist mucous membranes  NECK: Supple, No JVD  NERVOUS SYSTEM:  Alert & Oriented X3, Motor Strength 5/5 B/L upper and lower extremities; DTRs 2+ intact and symmetric  CHEST/LUNG: mild bibasilar crackles. no respiratory distress.   HEART: Regular rate and rhythm; No murmurs, rubs, or gallops  ABDOMEN: Soft, Nontender, Nondistended; Bowel sounds present + spc  EXTREMITIES:  2+ Peripheral Pulses, No clubbing, cyanosis, or edema    LABS:                                       8.7    11.4  )-----------( 218      ( 17 Nov 2017 07:38 )             26.5   11-16    143  |  112<H>  |  12  ----------------------------<  213<H>  4.0   |  22  |  1.11    Ca    7.7<L>      16 Nov 2017 07:02  Phos  2.2     11-17  Mg     1.6     11-17           CAPILLARY BLOOD GLUCOSE      RECENT CULTURES:      RADIOLOGY & ADDITIONAL TESTS:  Imaging Personally Reviewed:  [ ] YES      Consultant(s) Notes Reviewed:  [x ] YES     Care Discussed with [ ] Consultants [X ] Patient [ ] Family  [x ]    [x ]  Other; RN CHIEF COMPLAINT/INTERVAL HISTORY:    Patient is a 89y old  Male who presents with a chief complaint of diarrhea (09 Nov 2017 14:30)      HPI:  89 y/o male pmh signif for HTN, HLD, CAD (s/p AICD placement after vt), DM-2 chronic urinary retention requiring chronic suprapubic cath, recent L3 fracture requiring stay in rehab, d/piper from rehab 4 days ago c/o 2 episodes of non bloody, watery stool per day for past 3 days. States other people in rehab also had diarrhea. Denies fever, n/v, abd pain, lightheadedness/dizziness, cp, sob. No urinary symptoms (has suprapubic catheter). Can walk with walker, otherwise usual state of health. (09 Nov 2017 14:30) + for c diff       SUBJECTIVE & OBJECTIVE: Pt seen and examined at bedside. diarrhea remains however better    MEDICATIONS  (STANDING):  aspirin enteric coated 81 milliGRAM(s) Oral daily  atorvastatin 10 milliGRAM(s) Oral at bedtime  carvedilol 6.25 milliGRAM(s) Oral every 12 hours  dextrose 5% + sodium chloride 0.9%. 1000 milliLiter(s) (100 mL/Hr) IV Continuous <Continuous>  dextrose 5%. 1000 milliLiter(s) (50 mL/Hr) IV Continuous <Continuous>  dextrose 50% Injectable 12.5 Gram(s) IV Push once  dextrose 50% Injectable 25 Gram(s) IV Push once  dextrose 50% Injectable 25 Gram(s) IV Push once  fidaxomicin 200 milliGRAM(s) Oral two times a day  finasteride 5 milliGRAM(s) Oral daily  furosemide    Tablet 20 milliGRAM(s) Oral daily  heparin  Injectable 5000 Unit(s) SubCutaneous every 12 hours  insulin lispro (HumaLOG) corrective regimen sliding scale   SubCutaneous three times a day before meals  insulin lispro (HumaLOG) corrective regimen sliding scale   SubCutaneous at bedtime  lactobacillus acidophilus 1 Tablet(s) Oral two times a day with meals  losartan 25 milliGRAM(s) Oral daily  nystatin Cream 1 Application(s) Topical two times a day  potassium phosphate IVPB 15 milliMole(s) IV Intermittent once  sertraline 50 milliGRAM(s) Oral daily    MEDICATIONS  (PRN):  acetaminophen   Tablet 650 milliGRAM(s) Oral every 6 hours PRN For Temp greater than 38 C (100.4 F)  dextrose Gel 1 Dose(s) Oral once PRN Blood Glucose LESS THAN 70 milliGRAM(s)/deciliter  glucagon  Injectable 1 milliGRAM(s) IntraMuscular once PRN Glucose LESS THAN 70 milligrams/deciliter    Vital Signs Last 24 Hrs  T(C): 35.9 (17 Nov 2017 11:01), Max: 36.6 (16 Nov 2017 11:55)  T(F): 96.7 (17 Nov 2017 11:01), Max: 97.9 (16 Nov 2017 11:55)  HR: 68 (17 Nov 2017 11:01) (63 - 73)  BP: 148/65 (17 Nov 2017 11:01) (136/70 - 148/71)  BP(mean): --  RR: 18 (17 Nov 2017 11:01) (16 - 18)  SpO2: 100% (17 Nov 2017 11:01) (95% - 100%)      PHYSICAL EXAM:    GENERAL: NAD, well-groomed, well-developed, elderly male in bed   HEAD:  Atraumatic, Normocephalic  EYES: EOMI, PERRLA, conjunctiva and sclera clear  ENMT: Moist mucous membranes  NECK: Supple, No JVD  NERVOUS SYSTEM:  Alert & Oriented X3, Motor Strength 4/5 B/L upper and lower extremities;  left upper ext edema,  DTRs 2+ intact and symmetric  CHEST/LUNG: no respiratory distress.   HEART: Regular rate and rhythm; No murmurs, rubs, or gallops  ABDOMEN: Soft, Nontender, Nondistended; Bowel sounds present + spc erythema   EXTREMITIES:  2+ Peripheral Pulses, No clubbing, cyanosis, or edema    LABS:                                       8.7    11.4  )-----------( 218      ( 17 Nov 2017 07:38 )             26.5   11-16    143  |  112<H>  |  12  ----------------------------<  213<H>  4.0   |  22  |  1.11    Ca    7.7<L>      16 Nov 2017 07:02  Phos  2.2     11-17  Mg     1.6     11-17           CAPILLARY BLOOD GLUCOSE      RECENT CULTURES:      RADIOLOGY & ADDITIONAL TESTS:  Imaging Personally Reviewed:  [ ] YES      Consultant(s) Notes Reviewed:  [x ] YES     Care Discussed with [ ] Consultants [X ] Patient [ ] Family  [x ]    [x ]  Other; RN

## 2017-11-17 NOTE — PROGRESS NOTE ADULT - PROBLEM SELECTOR PLAN 7
renew home meds  Pt has suprapubic cath that needs to be changed  can be changed at next appropriate date or before discharge. renew home meds  Pt has suprapubic cath that needs to be changed  can be changed at next appropriate date or before discharge.   use bacitracin and clean area

## 2017-11-17 NOTE — CHART NOTE - NSCHARTNOTEFT_GEN_A_CORE
Assessment:  Pt's colitis improving. Last BM x 1(11/17) soft formed. Pt wearing upper & lower dentures fitting well. Pt states he dislikes puree foods & dislikes thickened liquids. s/p swallow evaluation 11/10 recommended puree/nectar thick liquids. MD ordered repeat swallow eval to assess need to advance po diet. Pt & friend requests mechanical soft/thin liquids with Glucerna shakes.     Factors impacting intake: [ ] none [ ] nausea  [ ] vomiting [ ] diarrhea [ ] constipation  [ ]chewing problems [ ] swallowing issues  [ ] other:     Diet Prescription: Diet, Dysphagia 1 Pureed-Nectar Consistency Fluid:   Consistent Carbohydrate {Evening Snack}  Supplement Feeding Modality:  Oral  Health Shake No Sugar Cans or Servings Per Day:  1       Frequency:  Two Times a day (11-10-17 @ 15:09) (200kcal & 10gm protein)    Intake: po intake fluctuates % meals & occassionally 0%.     Current Weight: Wt-63kg(11/14/17); no edema (questionable wt=54.4kg(11/9))  % Weight Change    Pertinent Medications: MEDICATIONS  (STANDING):  aspirin enteric coated 81 milliGRAM(s) Oral daily  atorvastatin 10 milliGRAM(s) Oral at bedtime  BACItracin   Ointment 1 Application(s) Topical daily  carvedilol 6.25 milliGRAM(s) Oral every 12 hours  dextrose 5% + sodium chloride 0.9%. 1000 milliLiter(s) (100 mL/Hr) IV Continuous <Continuous>  dextrose 5%. 1000 milliLiter(s) (50 mL/Hr) IV Continuous <Continuous>  dextrose 50% Injectable 12.5 Gram(s) IV Push once  dextrose 50% Injectable 25 Gram(s) IV Push once  dextrose 50% Injectable 25 Gram(s) IV Push once  fidaxomicin 200 milliGRAM(s) Oral two times a day  finasteride 5 milliGRAM(s) Oral daily  furosemide    Tablet 20 milliGRAM(s) Oral daily  heparin  Injectable 5000 Unit(s) SubCutaneous every 12 hours  insulin lispro (HumaLOG) corrective regimen sliding scale   SubCutaneous three times a day before meals  insulin lispro (HumaLOG) corrective regimen sliding scale   SubCutaneous at bedtime  lactobacillus acidophilus 1 Tablet(s) Oral two times a day with meals  losartan 25 milliGRAM(s) Oral daily  nystatin Cream 1 Application(s) Topical two times a day  sertraline 50 milliGRAM(s) Oral daily    MEDICATIONS  (PRN):  acetaminophen   Tablet 650 milliGRAM(s) Oral every 6 hours PRN For Temp greater than 38 C (100.4 F)  dextrose Gel 1 Dose(s) Oral once PRN Blood Glucose LESS THAN 70 milliGRAM(s)/deciliter  glucagon  Injectable 1 milliGRAM(s) IntraMuscular once PRN Glucose LESS THAN 70 milligrams/deciliter    Pertinent Labs: 11-17 Nan/a   Glu n/a   K+ n/a   Cr  n/a   BUN n/a   Phos 2.2 mg/dL<L> Alb n/a   PAB n/a        CAPILLARY BLOOD GLUCOSE      POCT Blood Glucose.: 269 mg/dL (17 Nov 2017 12:06)  POCT Blood Glucose.: 292 mg/dL (17 Nov 2017 12:04)  POCT Blood Glucose.: 254 mg/dL (17 Nov 2017 07:48)  POCT Blood Glucose.: 219 mg/dL (16 Nov 2017 21:42)  POCT Blood Glucose.: 197 mg/dL (16 Nov 2017 16:57)    Skin: intact    Estimated Needs:   [x ] no change since previous assessment  [ ] recalculated:     Previous Nutrition Diagnosis:   [ ] Inadequate Energy Intake [ ]Inadequate Oral Intake [ ] Excessive Energy Intake   [ ] Underweight [ ] Increased Nutrient Needs [ ] Overweight/Obesity   [ ] Altered GI Function [ ] Unintended Weight Loss [ ] Food & Nutrition Related Knowledge Deficit [x ] Chronic severe malnutrition  Nutrition Diagnosis is [ ] ongoing  [ ] resolved [ ] not applicable     New Nutrition Diagnosis: [x ] not applicable       Interventions:   Recommend continue Puree/nectar thick liquids/[facility] with snack/diet health shake 2 x day until swallow evaluation  [ ] Change Diet To:  [ ] Nutrition Supplement  [ ] Nutrition Support  [x ] Other: pending swallow evaluation to assess ability to advance po diet mechanical soft/thin liquids & Glucerna shake per pt request    Monitoring and Evaluation:   [x ] PO intake [ x ] Tolerance to diet prescription [ x ] weights [ x ] labs[ x ] follow up per protocol  [ ] other: Assessment:  Pt's colitis improving. Last BM x 1(11/17) soft formed. Pt wearing upper & lower dentures fitting well. Pt states he dislikes puree foods & dislikes thickened liquids. s/p swallow evaluation 11/10 recommended puree/nectar thick liquids. MD ordered repeat swallow eval to assess need to advance po diet. Pt & friend requests mechanical soft/thin liquids with Glucerna shakes.     Factors impacting intake: [ ] none [ ] nausea  [ ] vomiting [ ] diarrhea [ ] constipation  [ ]chewing problems [ ] swallowing issues  [ ] other:     Diet Prescription: Diet, Dysphagia 1 Pureed-Nectar Consistency Fluid:   Consistent Carbohydrate {Evening Snack}  Supplement Feeding Modality:  Oral  Health Shake No Sugar Cans or Servings Per Day:  1       Frequency:  Two Times a day (11-10-17 @ 15:09) (200kcal & 10gm protein)    Intake: po intake fluctuates % meals & occassionally 0%.     Current Weight: Wt-63kg(11/14/17); no edema (questionable wt=54.4kg(11/9))  % Weight Change    Pertinent Medications: MEDICATIONS  (STANDING):  aspirin enteric coated 81 milliGRAM(s) Oral daily  atorvastatin 10 milliGRAM(s) Oral at bedtime  BACItracin   Ointment 1 Application(s) Topical daily  carvedilol 6.25 milliGRAM(s) Oral every 12 hours  dextrose 5% + sodium chloride 0.9%. 1000 milliLiter(s) (100 mL/Hr) IV Continuous <Continuous>  dextrose 5%. 1000 milliLiter(s) (50 mL/Hr) IV Continuous <Continuous>  dextrose 50% Injectable 12.5 Gram(s) IV Push once  dextrose 50% Injectable 25 Gram(s) IV Push once  dextrose 50% Injectable 25 Gram(s) IV Push once  fidaxomicin 200 milliGRAM(s) Oral two times a day  finasteride 5 milliGRAM(s) Oral daily  furosemide    Tablet 20 milliGRAM(s) Oral daily  heparin  Injectable 5000 Unit(s) SubCutaneous every 12 hours  insulin lispro (HumaLOG) corrective regimen sliding scale   SubCutaneous three times a day before meals  insulin lispro (HumaLOG) corrective regimen sliding scale   SubCutaneous at bedtime  lactobacillus acidophilus 1 Tablet(s) Oral two times a day with meals  losartan 25 milliGRAM(s) Oral daily  nystatin Cream 1 Application(s) Topical two times a day  sertraline 50 milliGRAM(s) Oral daily    MEDICATIONS  (PRN):  acetaminophen   Tablet 650 milliGRAM(s) Oral every 6 hours PRN For Temp greater than 38 C (100.4 F)  dextrose Gel 1 Dose(s) Oral once PRN Blood Glucose LESS THAN 70 milliGRAM(s)/deciliter  glucagon  Injectable 1 milliGRAM(s) IntraMuscular once PRN Glucose LESS THAN 70 milligrams/deciliter    Pertinent Labs: 11-17 Nan/a   Glu n/a   K+ n/a   Cr  n/a   BUN n/a   Phos 2.2 mg/dL<L> Alb n/a   PAB n/a        CAPILLARY BLOOD GLUCOSE      POCT Blood Glucose.: 269 mg/dL (17 Nov 2017 12:06)  POCT Blood Glucose.: 292 mg/dL (17 Nov 2017 12:04)  POCT Blood Glucose.: 254 mg/dL (17 Nov 2017 07:48)  POCT Blood Glucose.: 219 mg/dL (16 Nov 2017 21:42)  POCT Blood Glucose.: 197 mg/dL (16 Nov 2017 16:57)    Skin: intact    Estimated Needs:   [x ] no change since previous assessment  [ ] recalculated:     Previous Nutrition Diagnosis:   [ ] Inadequate Energy Intake [ ]Inadequate Oral Intake [ ] Excessive Energy Intake   [ ] Underweight [ ] Increased Nutrient Needs [ ] Overweight/Obesity   [ ] Altered GI Function [ ] Unintended Weight Loss [ ] Food & Nutrition Related Knowledge Deficit [x ] Chronic severe malnutrition  Nutrition Diagnosis is [ ] ongoing  [ ] resolved [ ] not applicable     New Nutrition Diagnosis: [x ] not applicable       Interventions:   Recommend continue Puree/nectar thick liquids/[facility] with snack/diet health shake 2 x day until swallow evaluation  [ ] Change Diet To:  [ ] Nutrition Supplement  [ ] Nutrition Support  [x ] Other: pending swallow evaluation to assess ability to advance po diet mechanical soft/thin liquids & Glucerna shake per pt request & consider need to d/c IVF D5 due to elevated BG level    Monitoring and Evaluation:   [x ] PO intake [ x ] Tolerance to diet prescription [ x ] weights [ x ] labs[ x ] follow up per protocol  [ ] other: Assessment:  Pt's colitis improving. Last BM x 1(11/17) soft formed. Pt wearing upper & lower dentures fitting well. Pt states he dislikes puree foods & dislikes thickened liquids. s/p swallow evaluation 11/10 recommended puree/nectar thick liquids. MD ordered repeat swallow eval to assess need to advance po diet. Pt & friend requests mechanical soft/thin liquids with Glucerna shakes.     Factors impacting intake: [ ] none [ ] nausea  [ ] vomiting [ ] diarrhea [ ] constipation  [x ]chewing problems [ ] swallowing issues  [ ] other:     Diet Prescription: Diet, Dysphagia 1 Pureed-Nectar Consistency Fluid:   Consistent Carbohydrate {Evening Snack}  Supplement Feeding Modality:  Oral  Health Shake No Sugar Cans or Servings Per Day:  1       Frequency:  Two Times a day (11-10-17 @ 15:09) (200kcal & 10gm protein)    Intake: po intake fluctuates % meals & occassionally 0%.     Current Weight: Wt-63kg(11/14/17); no edema (questionable wt=54.4kg(11/9))  % Weight Change    Pertinent Medications: MEDICATIONS  (STANDING):  aspirin enteric coated 81 milliGRAM(s) Oral daily  atorvastatin 10 milliGRAM(s) Oral at bedtime  BACItracin   Ointment 1 Application(s) Topical daily  carvedilol 6.25 milliGRAM(s) Oral every 12 hours  dextrose 5% + sodium chloride 0.9%. 1000 milliLiter(s) (100 mL/Hr) IV Continuous <Continuous>  dextrose 5%. 1000 milliLiter(s) (50 mL/Hr) IV Continuous <Continuous>  dextrose 50% Injectable 12.5 Gram(s) IV Push once  dextrose 50% Injectable 25 Gram(s) IV Push once  dextrose 50% Injectable 25 Gram(s) IV Push once  fidaxomicin 200 milliGRAM(s) Oral two times a day  finasteride 5 milliGRAM(s) Oral daily  furosemide    Tablet 20 milliGRAM(s) Oral daily  heparin  Injectable 5000 Unit(s) SubCutaneous every 12 hours  insulin lispro (HumaLOG) corrective regimen sliding scale   SubCutaneous three times a day before meals  insulin lispro (HumaLOG) corrective regimen sliding scale   SubCutaneous at bedtime  lactobacillus acidophilus 1 Tablet(s) Oral two times a day with meals  losartan 25 milliGRAM(s) Oral daily  nystatin Cream 1 Application(s) Topical two times a day  sertraline 50 milliGRAM(s) Oral daily    MEDICATIONS  (PRN):  acetaminophen   Tablet 650 milliGRAM(s) Oral every 6 hours PRN For Temp greater than 38 C (100.4 F)  dextrose Gel 1 Dose(s) Oral once PRN Blood Glucose LESS THAN 70 milliGRAM(s)/deciliter  glucagon  Injectable 1 milliGRAM(s) IntraMuscular once PRN Glucose LESS THAN 70 milligrams/deciliter    Pertinent Labs: 11-17 Nan/a   Glu n/a   K+ n/a   Cr  n/a   BUN n/a   Phos 2.2 mg/dL<L> Alb n/a   PAB n/a        CAPILLARY BLOOD GLUCOSE      POCT Blood Glucose.: 269 mg/dL (17 Nov 2017 12:06)  POCT Blood Glucose.: 292 mg/dL (17 Nov 2017 12:04)  POCT Blood Glucose.: 254 mg/dL (17 Nov 2017 07:48)  POCT Blood Glucose.: 219 mg/dL (16 Nov 2017 21:42)  POCT Blood Glucose.: 197 mg/dL (16 Nov 2017 16:57)    Skin: intact    Estimated Needs:   [x ] no change since previous assessment  [ ] recalculated:     Previous Nutrition Diagnosis:   [ ] Inadequate Energy Intake [ ]Inadequate Oral Intake [ ] Excessive Energy Intake   [ ] Underweight [ ] Increased Nutrient Needs [ ] Overweight/Obesity   [ ] Altered GI Function [ ] Unintended Weight Loss [ ] Food & Nutrition Related Knowledge Deficit [x ] Chronic severe malnutrition  Nutrition Diagnosis is [x ] ongoing  [ ] resolved [ ] not applicable     New Nutrition Diagnosis: [x ] not applicable       Interventions:   Recommend continue Puree/nectar thick liquids/[facility] with snack/diet health shake 2 x day until swallow evaluation  [ ] Change Diet To:  [ ] Nutrition Supplement  [ ] Nutrition Support  [x ] Other: pending swallow evaluation to assess ability to advance po diet mechanical soft/thin liquids & Glucerna shake per pt request & consider need to d/c IVF D5 due to elevated BG level    Monitoring and Evaluation:   [x ] PO intake [ x ] Tolerance to diet prescription [ x ] weights [ x ] labs[ x ] follow up per protocol  [ ] other: Assessment:  Pt's colitis improving. Last BM x 1(11/17) soft formed. Pt wearing upper & lower dentures fitting well. Pt states he dislikes puree foods & dislikes thickened liquids. s/p swallow evaluation 11/10 recommended puree/nectar thick liquids. MD ordered repeat swallow eval to assess need to advance po diet. Pt & friend requests mechanical soft/thin liquids with Glucerna shakes.     Factors impacting intake: [ ] none [ ] nausea  [ ] vomiting [ ] diarrhea [ ] constipation  [x ]chewing problems [ ] swallowing issues  [ ] other:     Diet Prescription: Diet, Dysphagia 1 Pureed-Nectar Consistency Fluid:   Consistent Carbohydrate {Evening Snack}  Supplement Feeding Modality:  Oral  Health Shake No Sugar Cans or Servings Per Day:  1       Frequency:  Two Times a day (11-10-17 @ 15:09) (200kcal & 10gm protein)    Intake: po intake fluctuates % meals & occasionally 0%.     Current Weight: Wt-63kg(11/14/17); no edema (questionable wt=54.4kg(11/9))  % Weight Change questionable 8.6kg wt gain(16%) x 5 days; recommend reweigh to verify wt change    Pertinent Medications: MEDICATIONS  (STANDING):  aspirin enteric coated 81 milliGRAM(s) Oral daily  atorvastatin 10 milliGRAM(s) Oral at bedtime  BACItracin   Ointment 1 Application(s) Topical daily  carvedilol 6.25 milliGRAM(s) Oral every 12 hours  dextrose 5% + sodium chloride 0.9%. 1000 milliLiter(s) (100 mL/Hr) IV Continuous <Continuous>  dextrose 5%. 1000 milliLiter(s) (50 mL/Hr) IV Continuous <Continuous>  dextrose 50% Injectable 12.5 Gram(s) IV Push once  dextrose 50% Injectable 25 Gram(s) IV Push once  dextrose 50% Injectable 25 Gram(s) IV Push once  fidaxomicin 200 milliGRAM(s) Oral two times a day  finasteride 5 milliGRAM(s) Oral daily  furosemide    Tablet 20 milliGRAM(s) Oral daily  heparin  Injectable 5000 Unit(s) SubCutaneous every 12 hours  insulin lispro (HumaLOG) corrective regimen sliding scale   SubCutaneous three times a day before meals  insulin lispro (HumaLOG) corrective regimen sliding scale   SubCutaneous at bedtime  lactobacillus acidophilus 1 Tablet(s) Oral two times a day with meals  losartan 25 milliGRAM(s) Oral daily  nystatin Cream 1 Application(s) Topical two times a day  sertraline 50 milliGRAM(s) Oral daily    MEDICATIONS  (PRN):  acetaminophen   Tablet 650 milliGRAM(s) Oral every 6 hours PRN For Temp greater than 38 C (100.4 F)  dextrose Gel 1 Dose(s) Oral once PRN Blood Glucose LESS THAN 70 milliGRAM(s)/deciliter  glucagon  Injectable 1 milliGRAM(s) IntraMuscular once PRN Glucose LESS THAN 70 milligrams/deciliter    Pertinent Labs: 11-17 Nan/a   Glu n/a   K+ n/a   Cr  n/a   BUN n/a   Phos 2.2 mg/dL<L> Alb n/a   PAB n/a        CAPILLARY BLOOD GLUCOSE      POCT Blood Glucose.: 269 mg/dL (17 Nov 2017 12:06)  POCT Blood Glucose.: 292 mg/dL (17 Nov 2017 12:04)  POCT Blood Glucose.: 254 mg/dL (17 Nov 2017 07:48)  POCT Blood Glucose.: 219 mg/dL (16 Nov 2017 21:42)  POCT Blood Glucose.: 197 mg/dL (16 Nov 2017 16:57)    Skin: intact    Estimated Needs:   [x ] no change since previous assessment  [ ] recalculated:     Previous Nutrition Diagnosis:   [ ] Inadequate Energy Intake [ ]Inadequate Oral Intake [ ] Excessive Energy Intake   [ ] Underweight [ ] Increased Nutrient Needs [ ] Overweight/Obesity   [ ] Altered GI Function [ ] Unintended Weight Loss [ ] Food & Nutrition Related Knowledge Deficit [x ] Chronic severe malnutrition  Nutrition Diagnosis is [x ] ongoing  [ ] resolved [ ] not applicable     New Nutrition Diagnosis: [x ] not applicable       Interventions:   Recommend continue Puree/nectar thick liquids/[facility] with snack/diet health shake 2 x day until swallow evaluation  [ ] Change Diet To:  [ ] Nutrition Supplement  [ ] Nutrition Support  [x ] Other: pending swallow evaluation to assess ability to advance po diet mechanical soft/thin liquids & Glucerna shake per pt request & consider need to d/c IVF D5 due to elevated BG level    Monitoring and Evaluation:   [x ] PO intake [ x ] Tolerance to diet prescription [ x ] weights [ x ] labs[ x ] follow up per protocol  [ ] other:

## 2017-11-17 NOTE — PROGRESS NOTE ADULT - SUBJECTIVE AND OBJECTIVE BOX
Initial Consultaion Note  Requested by Name: Dr. Anna  Date/ Time: 11/17/17  Reason for referral/ Consultation:  HPI:  89 y/o male pmh signif for HTN, HLD, CAD (s/p AICD placement after vt), DM-2 chronic urinary retention requiring chronic suprapubic cath, recent L3 fracture requiring stay in rehab, d/piper from rehab 4 days ago c/o 2 episodes of non bloody, watery stool per day for past 3 days. States other people in rehab also had diarrhea. Denies fever, n/v, abd pain, lightheadedness/dizziness, cp, sob. No urinary symptoms (has suprapubic catheter). Can walk with walker, otherwise usual state of health.  PAST MEDICAL & SURGICAL HISTORY:  Hypertension  High cholesterol  Depression  Enlarged prostate  Heart attack: 1985  Diabetes mellitus  Artificial pacemaker  S/P cataract extraction and insertion of intraocular lens, right: November 2013  Suprapubic catheter: 2012  Enlarged prostate: had surgery  S/P inguinal hernia repair: left  Fracture: left  &amp; had surgery    SOCIAL HISTORY: Admitted from: home [x ] SNF [ ] [facility] [ ] AL [ ]                                                                smoker [ ] past smoker [ ] non smoker[ ]                                                              no alcohol [ ] social alcohol [ ] alcohol [ ]  Surrogate/ HCP/ Guardian: [ ] YES [ ] NO. Name/ Phone#: Sarah Alva   Significant other/partner:                     Children:                         Rastafari/Spirituality:    FAMILY HISTORY:  No pertinent family history in first degree relatives  Baseline ADLs (Prior to admission)  Independent:  Moderately [ ] fully [ ]   Dependent: moderately [ ] fully [ x]    ADVANCE DIRECTIVES:  [x ] YES [ ] NO   DNR: YES [x ] NO [  ]  Completed on:                     MOLST: YES [x ] NO [  ]  Completed on:  Living Will: YES [ ] NO [  ]  Completed on:    Allergies    Cipro I.V. (Other)  indomethacin (Other)  Keflex (Unknown)  Macrobid (Unknown)  Plavix (Diarrhea; Other; Rash)  pravastatin (Unknown)  sulfamethoxazole (Unknown)  Intolerances  MEDICATIONS  (STANDING):  aspirin enteric coated 81 milliGRAM(s) Oral daily  atorvastatin 10 milliGRAM(s) Oral at bedtime  BACItracin   Ointment 1 Application(s) Topical daily  carvedilol 6.25 milliGRAM(s) Oral every 12 hours  dextrose 5% + sodium chloride 0.9%. 1000 milliLiter(s) (100 mL/Hr) IV Continuous <Continuous>  dextrose 5%. 1000 milliLiter(s) (50 mL/Hr) IV Continuous <Continuous>  dextrose 50% Injectable 12.5 Gram(s) IV Push once  dextrose 50% Injectable 25 Gram(s) IV Push once  dextrose 50% Injectable 25 Gram(s) IV Push once  fidaxomicin 200 milliGRAM(s) Oral two times a day  finasteride 5 milliGRAM(s) Oral daily  furosemide    Tablet 20 milliGRAM(s) Oral daily  heparin  Injectable 5000 Unit(s) SubCutaneous every 12 hours  insulin lispro (HumaLOG) corrective regimen sliding scale   SubCutaneous three times a day before meals  insulin lispro (HumaLOG) corrective regimen sliding scale   SubCutaneous at bedtime  lactobacillus acidophilus 1 Tablet(s) Oral two times a day with meals  losartan 25 milliGRAM(s) Oral daily  nystatin Cream 1 Application(s) Topical two times a day  sertraline 50 milliGRAM(s) Oral daily    MEDICATIONS  (PRN):  acetaminophen   Tablet 650 milliGRAM(s) Oral every 6 hours PRN For Temp greater than 38 C (100.4 F)  dextrose Gel 1 Dose(s) Oral once PRN Blood Glucose LESS THAN 70 milliGRAM(s)/deciliter  glucagon  Injectable 1 milliGRAM(s) IntraMuscular once PRN Glucose LESS THAN 70 milligrams/deciliter    OPIATE NAIVE: (Y/N)  I STOP REVIEWED: (Y/N) : (#-------------------)   Review of Systems:     PAIN : (Y/N) (0-10)  PAIN SITE :  Generalized    QUALITY/QUANTITY OF PAIN :  dull    PAIN RADIATING : no  SEVERITY :            FREQUENCY :  IMPACT ON ADLs :  total care    DYSPNEA: Yes [ x ] No [  ] Mild [ ] Moderate [x ] Severe [ ]  NAUSEA/VOMITING: Yes [  ] No [x  ]  EXCESSIVE SECRETIONS: YES [  ] NO [x  ]  DEPRESSION: Yes [ x ] No [  ] Mild [ ]Moderate [x ] Severe [ ]  ANXIETY: Yes [  ] No [ x ]  AGITATION: Yes [  ] No [ x ]  CONSTIPATION : Yes [  ] No [ x ]  DIARRHEA : Yes [ x ] No [  ]  FRAILTY SYNDROME: Yes [ x ] No [  ]  FAILURE TO THRIVE: Yes [ x ] No [  ]  DIBILITY: Yes [x  ] No [  ]  OTHER SYMPTOMS :  UNABLE TO OBTAIN DUE TO POOR MENTATION [ x ]    PHYSICAL EXAM:  T(F): 96.7 (11-17-17 @ 11:01), Max: 97.4 (11-17-17 @ 05:29)  HR: 70 (11-17-17 @ 15:17) (63 - 71)  BP: 148/65 (11-17-17 @ 11:01) (136/70 - 148/65)  RR: 19 (11-17-17 @ 15:17) (16 - 19)  SpO2: 97% (11-17-17 @ 15:17) (96% - 100%)  Wt(kg): --   WEIGHT :                      BMI:  I&O's Summary    16 Nov 2017 07:01  -  17 Nov 2017 07:00  --------------------------------------------------------  IN: 3190 mL / OUT: 2000 mL / NET: 1190 mL    17 Nov 2017 07:01  -  17 Nov 2017 16:28  --------------------------------------------------------  IN: 375 mL / OUT: 0 mL / NET: 375 mL  CAPILLARY BLOOD GLUCOSE  POCT Blood Glucose.: 269 mg/dL (17 Nov 2017 12:06)  POCT Blood Glucose.: 292 mg/dL (17 Nov 2017 12:04)  POCT Blood Glucose.: 254 mg/dL (17 Nov 2017 07:48)  POCT Blood Glucose.: 219 mg/dL (16 Nov 2017 21:42)  POCT Blood Glucose.: 197 mg/dL (16 Nov 2017 16:57)  GENERAL: alert: Yes [X ] No [  ]oriented x ______ lethargic [ X] agitated [ ] cachexia [ ] nonverbal [ ] coma [ ]  HEENT: normal [ ] dry mouth [ X] Bipap [  ] ET tube/trach [ ]Vent [  ] excessive secretions [ ]  LUNGS: Comfortable [ ] tachypnea/ labored breathing[X ]   CV :  normal [X ] tachycardia [ ]bradycardia [  ]   GI : normal [ ] distended [X] tender [ ] no BS [ ]  PEG /NG tube [ ]   : normal [ ] incontinent [ X] oliguria/anuria [ ] yanes [ ]  MSK : normal [ ] weakness [X ] edema [ ] ambulatory [ ] bedbound/ wheelchair bound [X ]   SKIN : normal [x ] pressure ulcer: Yes [  ] No [  ] Stage ______________ rash: Yes [  ] No [  ]  Functional Assessment:   Karnofsky Performance Score : <30 %  Palliative Performance Status Version 2:         %    LABS:                        8.7    11.4  )-----------( 218      ( 17 Nov 2017 07:38 )             26.5     11-16    143  |  112<H>  |  12  ----------------------------<  213<H>  4.0   |  22  |  1.11    Ca    7.7<L>      16 Nov 2017 07:02  Phos  2.2     11-17  Mg     1.6     11-17  I&O's Detail    16 Nov 2017 07:01  -  17 Nov 2017 07:00  --------------------------------------------------------  IN:    dextrose 5% + sodium chloride 0.9%.: 1100 mL    Oral Fluid: 540 mL    Solution: 100 mL    Solution: 250 mL    Solution: 1200 mL  Total IN: 3190 mL    OUT:    Indwelling Catheter - Suprapubic: 2000 mL  Total OUT: 2000 mL    Total NET: 1190 mL      17 Nov 2017 07:01  -  17 Nov 2017 16:28  --------------------------------------------------------  IN:    Oral Fluid: 120 mL    Solution: 255 mL  Total IN: 375 mL    OUT:  Total OUT: 0 mL    Total NET: 375 mL  Assessment:   89y Male admitted with COLITIS AND DEHYDRATION  DIARREHEA  PROBLEM LIST :  PROBLEM/RECOMMENDATION: 1) Problem : Goals of care, counseling/ discussion.  Recommendation: Meet with patient and HCP SARAH ALVA family and discussed GOC & Advanced care planning                                    Palliative care information /counseling                                        Advanced Directives addressed   PROBLEM/ RECOMMENDATION:2)Problem :Resuscitation/ DNR/ DNI,   Recommendation: DNR/ DNI  PROBLEM/ RECOMMENDATION :3)Problem : Medical order for life sustaining treatment  Recommendation : MOLST Form complete  PROBLEM/RECOMMENDATION :4)Problem : Advanced care planning  Recommendation : Family meeting on: 11/17/17  Met with HCP Sarah Alva and discussed goals of care and advance care planning.  Comfort measures only.  DNR/ DNI.  Hospice evaluation called in.  PLAN:  REFERRALS  Hospice: YES [x  ] NO [  ]                         Palliative Med : YES [x  ] NO [  ]                         Unit SW/Case Mgmt: YES [ x ] NO [  ]                         : YES [x  ] NO [  ]                         Speech/Swallow: YES [  ] NO [  ]                         Pain Management: YES [  ] NO [  ]                         Nutrition: YES [  ] NO [  ]                         Ethics: YES [  ] NO [  ]                         PT/OT: YES [  ] NO [  ]  Symptom Management Recommendations:   Met with HCP Sarah Alva and discussed goals of care and advance care planning.  Comfort measures only.  DNR/ DNI.  Hospice evaluation called in.

## 2017-11-17 NOTE — PROGRESS NOTE ADULT - SUBJECTIVE AND OBJECTIVE BOX
Pt doing much better - more alert  stools now formed  on DIFICID      MEDICATIONS  (STANDING):  aspirin enteric coated 81 milliGRAM(s) Oral daily  atorvastatin 10 milliGRAM(s) Oral at bedtime  carvedilol 6.25 milliGRAM(s) Oral every 12 hours  dextrose 5% + sodium chloride 0.9%. 1000 milliLiter(s) (100 mL/Hr) IV Continuous <Continuous>  dextrose 5%. 1000 milliLiter(s) (50 mL/Hr) IV Continuous <Continuous>  dextrose 50% Injectable 12.5 Gram(s) IV Push once  dextrose 50% Injectable 25 Gram(s) IV Push once  dextrose 50% Injectable 25 Gram(s) IV Push once  fidaxomicin 200 milliGRAM(s) Oral two times a day  finasteride 5 milliGRAM(s) Oral daily  furosemide    Tablet 20 milliGRAM(s) Oral daily  heparin  Injectable 5000 Unit(s) SubCutaneous every 12 hours  insulin lispro (HumaLOG) corrective regimen sliding scale   SubCutaneous three times a day before meals  insulin lispro (HumaLOG) corrective regimen sliding scale   SubCutaneous at bedtime  lactobacillus acidophilus 1 Tablet(s) Oral two times a day with meals  losartan 25 milliGRAM(s) Oral daily  nystatin Cream 1 Application(s) Topical two times a day  sertraline 50 milliGRAM(s) Oral daily    MEDICATIONS  (PRN):  acetaminophen   Tablet 650 milliGRAM(s) Oral every 6 hours PRN For Temp greater than 38 C (100.4 F)  dextrose Gel 1 Dose(s) Oral once PRN Blood Glucose LESS THAN 70 milliGRAM(s)/deciliter  glucagon  Injectable 1 milliGRAM(s) IntraMuscular once PRN Glucose LESS THAN 70 milligrams/deciliter      Allergies    Cipro I.V. (Other)  indomethacin (Other)  Keflex (Unknown)  Macrobid (Unknown)  Plavix (Diarrhea; Other; Rash)  pravastatin (Unknown)  sulfamethoxazole (Unknown)    Intolerances        Vital Signs Last 24 Hrs  T(C): 36.3 (17 Nov 2017 05:29), Max: 36.6 (16 Nov 2017 11:55)  T(F): 97.4 (17 Nov 2017 05:29), Max: 97.9 (16 Nov 2017 11:55)  HR: 67 (17 Nov 2017 05:29) (63 - 73)  BP: 142/67 (17 Nov 2017 05:29) (136/70 - 148/71)  BP(mean): --  RR: 16 (17 Nov 2017 05:29) (16 - 17)  SpO2: 96% (17 Nov 2017 05:29) (95% - 99%)    PHYSICAL EXAM:  General: NAD.  CVS: S1, S2  Chest: air entry bilaterally present  Abd: BS present, soft, non-tender      LABS:                        8.7    11.4  )-----------( 218      ( 17 Nov 2017 07:38 )             26.5     11-16    143  |  112<H>  |  12  ----------------------------<  213<H>  4.0   |  22  |  1.11    Ca    7.7<L>      16 Nov 2017 07:02  Phos  2.2     11-17  Mg     1.6     11-17        diet as tolerated  continue dificid

## 2017-11-17 NOTE — PROGRESS NOTE ADULT - PROBLEM SELECTOR PLAN 1
appears to be improving and now formed . has had several aprox 4 -5 small formed bm in 24 hours   secondary to cdiff. diarrhea improving. currently afebrile    continue vanco  continue flagyl and probiotic  per GI 11/14/17 started Dificid

## 2017-11-18 LAB
ANION GAP SERPL CALC-SCNC: 7 MMOL/L — SIGNIFICANT CHANGE UP (ref 5–17)
BUN SERPL-MCNC: 10 MG/DL — SIGNIFICANT CHANGE UP (ref 7–23)
CALCIUM SERPL-MCNC: 7.7 MG/DL — LOW (ref 8.5–10.1)
CHLORIDE SERPL-SCNC: 110 MMOL/L — HIGH (ref 96–108)
CO2 SERPL-SCNC: 24 MMOL/L — SIGNIFICANT CHANGE UP (ref 22–31)
CREAT SERPL-MCNC: 0.96 MG/DL — SIGNIFICANT CHANGE UP (ref 0.5–1.3)
GLUCOSE BLDC GLUCOMTR-MCNC: 242 MG/DL — HIGH (ref 70–99)
GLUCOSE BLDC GLUCOMTR-MCNC: 292 MG/DL — HIGH (ref 70–99)
GLUCOSE BLDC GLUCOMTR-MCNC: 307 MG/DL — HIGH (ref 70–99)
GLUCOSE BLDC GLUCOMTR-MCNC: 96 MG/DL — SIGNIFICANT CHANGE UP (ref 70–99)
GLUCOSE SERPL-MCNC: 268 MG/DL — HIGH (ref 70–99)
HCT VFR BLD CALC: 28.7 % — LOW (ref 39–50)
HGB BLD-MCNC: 9.3 G/DL — LOW (ref 13–17)
MAGNESIUM SERPL-MCNC: 1.5 MG/DL — LOW (ref 1.6–2.6)
MCHC RBC-ENTMCNC: 32.5 GM/DL — SIGNIFICANT CHANGE UP (ref 32–36)
MCHC RBC-ENTMCNC: 33 PG — SIGNIFICANT CHANGE UP (ref 27–34)
MCV RBC AUTO: 101.4 FL — HIGH (ref 80–100)
PHOSPHATE SERPL-MCNC: 2.2 MG/DL — LOW (ref 2.5–4.5)
PLATELET # BLD AUTO: 204 K/UL — SIGNIFICANT CHANGE UP (ref 150–400)
POTASSIUM SERPL-MCNC: 4 MMOL/L — SIGNIFICANT CHANGE UP (ref 3.5–5.3)
POTASSIUM SERPL-SCNC: 4 MMOL/L — SIGNIFICANT CHANGE UP (ref 3.5–5.3)
RBC # BLD: 2.83 M/UL — LOW (ref 4.2–5.8)
RBC # FLD: 14 % — SIGNIFICANT CHANGE UP (ref 11–15)
SODIUM SERPL-SCNC: 141 MMOL/L — SIGNIFICANT CHANGE UP (ref 135–145)
WBC # BLD: 11.6 K/UL — HIGH (ref 3.8–10.5)
WBC # FLD AUTO: 11.6 K/UL — HIGH (ref 3.8–10.5)

## 2017-11-18 PROCEDURE — 99233 SBSQ HOSP IP/OBS HIGH 50: CPT

## 2017-11-18 PROCEDURE — 93971 EXTREMITY STUDY: CPT | Mod: 26,LT

## 2017-11-18 RX ADMIN — SODIUM CHLORIDE 100 MILLILITER(S): 9 INJECTION, SOLUTION INTRAVENOUS at 06:41

## 2017-11-18 RX ADMIN — Medication 1 APPLICATION(S): at 12:06

## 2017-11-18 RX ADMIN — Medication 1 TABLET(S): at 07:53

## 2017-11-18 RX ADMIN — Medication 6: at 17:05

## 2017-11-18 RX ADMIN — LOSARTAN POTASSIUM 25 MILLIGRAM(S): 100 TABLET, FILM COATED ORAL at 06:44

## 2017-11-18 RX ADMIN — Medication 20 MILLIGRAM(S): at 06:44

## 2017-11-18 RX ADMIN — FIDAXOMICIN 200 MILLIGRAM(S): 200 GRANULE, FOR SUSPENSION ORAL at 06:41

## 2017-11-18 RX ADMIN — CARVEDILOL PHOSPHATE 6.25 MILLIGRAM(S): 80 CAPSULE, EXTENDED RELEASE ORAL at 06:44

## 2017-11-18 RX ADMIN — CARVEDILOL PHOSPHATE 6.25 MILLIGRAM(S): 80 CAPSULE, EXTENDED RELEASE ORAL at 17:05

## 2017-11-18 RX ADMIN — Medication 4: at 07:53

## 2017-11-18 RX ADMIN — FIDAXOMICIN 200 MILLIGRAM(S): 200 GRANULE, FOR SUSPENSION ORAL at 17:05

## 2017-11-18 RX ADMIN — Medication 81 MILLIGRAM(S): at 12:06

## 2017-11-18 RX ADMIN — SERTRALINE 50 MILLIGRAM(S): 25 TABLET, FILM COATED ORAL at 12:06

## 2017-11-18 RX ADMIN — HEPARIN SODIUM 5000 UNIT(S): 5000 INJECTION INTRAVENOUS; SUBCUTANEOUS at 17:05

## 2017-11-18 RX ADMIN — Medication 8: at 12:08

## 2017-11-18 RX ADMIN — HEPARIN SODIUM 5000 UNIT(S): 5000 INJECTION INTRAVENOUS; SUBCUTANEOUS at 06:41

## 2017-11-18 RX ADMIN — NYSTATIN CREAM 1 APPLICATION(S): 100000 CREAM TOPICAL at 06:44

## 2017-11-18 RX ADMIN — Medication 1 TABLET(S): at 17:05

## 2017-11-18 RX ADMIN — NYSTATIN CREAM 1 APPLICATION(S): 100000 CREAM TOPICAL at 17:06

## 2017-11-18 RX ADMIN — FINASTERIDE 5 MILLIGRAM(S): 5 TABLET, FILM COATED ORAL at 12:06

## 2017-11-18 RX ADMIN — SODIUM CHLORIDE 100 MILLILITER(S): 9 INJECTION, SOLUTION INTRAVENOUS at 12:02

## 2017-11-18 NOTE — PROGRESS NOTE ADULT - SUBJECTIVE AND OBJECTIVE BOX
Pt improving  decreased diarrhea  on dificid    MEDICATIONS  (STANDING):  aspirin enteric coated 81 milliGRAM(s) Oral daily  atorvastatin 10 milliGRAM(s) Oral at bedtime  BACItracin   Ointment 1 Application(s) Topical daily  carvedilol 6.25 milliGRAM(s) Oral every 12 hours  dextrose 5% + sodium chloride 0.9%. 1000 milliLiter(s) (100 mL/Hr) IV Continuous <Continuous>  dextrose 5%. 1000 milliLiter(s) (50 mL/Hr) IV Continuous <Continuous>  dextrose 50% Injectable 12.5 Gram(s) IV Push once  dextrose 50% Injectable 25 Gram(s) IV Push once  dextrose 50% Injectable 25 Gram(s) IV Push once  fidaxomicin 200 milliGRAM(s) Oral two times a day  finasteride 5 milliGRAM(s) Oral daily  furosemide    Tablet 20 milliGRAM(s) Oral daily  heparin  Injectable 5000 Unit(s) SubCutaneous every 12 hours  insulin lispro (HumaLOG) corrective regimen sliding scale   SubCutaneous three times a day before meals  insulin lispro (HumaLOG) corrective regimen sliding scale   SubCutaneous at bedtime  lactobacillus acidophilus 1 Tablet(s) Oral two times a day with meals  losartan 25 milliGRAM(s) Oral daily  nystatin Cream 1 Application(s) Topical two times a day  sertraline 50 milliGRAM(s) Oral daily    MEDICATIONS  (PRN):  acetaminophen   Tablet 650 milliGRAM(s) Oral every 6 hours PRN For Temp greater than 38 C (100.4 F)  dextrose Gel 1 Dose(s) Oral once PRN Blood Glucose LESS THAN 70 milliGRAM(s)/deciliter  glucagon  Injectable 1 milliGRAM(s) IntraMuscular once PRN Glucose LESS THAN 70 milligrams/deciliter      Allergies    Cipro I.V. (Other)  indomethacin (Other)  Keflex (Unknown)  Macrobid (Unknown)  Plavix (Diarrhea; Other; Rash)  pravastatin (Unknown)  sulfamethoxazole (Unknown)    Intolerances        Vital Signs Last 24 Hrs  T(C): 37.1 (18 Nov 2017 11:58), Max: 37.1 (18 Nov 2017 11:58)  T(F): 98.8 (18 Nov 2017 11:58), Max: 98.8 (18 Nov 2017 11:58)  HR: 71 (18 Nov 2017 11:58) (68 - 97)  BP: 139/68 (18 Nov 2017 11:58) (130/76 - 153/76)  BP(mean): --  RR: 18 (18 Nov 2017 11:58) (15 - 18)  SpO2: 99% (18 Nov 2017 11:58) (96% - 100%)    PHYSICAL EXAM:  General: NAD.  CVS: S1, S2  Chest: air entry bilaterally present  Abd: BS present, soft, non-tender      LABS:                        9.3    11.6  )-----------( 204      ( 18 Nov 2017 08:26 )             28.7     11-18    141  |  110<H>  |  10  ----------------------------<  268<H>  4.0   |  24  |  0.96    Ca    7.7<L>      18 Nov 2017 08:26  Phos  2.2     11-18  Mg     1.5     11-18      continue present diet  continue dificid

## 2017-11-18 NOTE — PROGRESS NOTE ADULT - PROBLEM SELECTOR PLAN 7
renew home meds  Pt has suprapubic cath that needs to be changed  can be changed at next appropriate date or before discharge.   use bacitracin and clean area

## 2017-11-18 NOTE — PROGRESS NOTE ADULT - SUBJECTIVE AND OBJECTIVE BOX
CHIEF COMPLAINT/INTERVAL HISTORY:    Patient is a 89y old  Male who presents with a chief complaint of diarrhea (09 Nov 2017 14:30)      HPI:  89 y/o male pmh signif for HTN, HLD, CAD (s/p AICD placement after vt), DM-2 chronic urinary retention requiring chronic suprapubic cath, recent L3 fracture requiring stay in rehab, d/piper from rehab 4 days ago c/o 2 episodes of non bloody, watery stool per day for past 3 days. States other people in rehab also had diarrhea. Denies fever, n/v, abd pain, lightheadedness/dizziness, cp, sob. No urinary symptoms (has suprapubic catheter). Can walk with walker, otherwise usual state of health. (09 Nov 2017 14:30) + for c diff       SUBJECTIVE & OBJECTIVE: Pt seen and examined at bedside. diarrhea better , formed stools but several a day    Vital Signs Last 24 Hrs  T(C): 36.1 (18 Nov 2017 05:53), Max: 36.6 (17 Nov 2017 17:58)  T(F): 96.9 (18 Nov 2017 05:53), Max: 97.9 (17 Nov 2017 17:58)  HR: 68 (18 Nov 2017 05:53) (68 - 97)  BP: 153/76 (18 Nov 2017 05:53) (130/76 - 153/76)  BP(mean): --  RR: 16 (18 Nov 2017 05:53) (15 - 19)  SpO2: 100% (18 Nov 2017 05:53) (96% - 100%)    PHYSICAL EXAM:    GENERAL: NAD, well-groomed, well-developed, elderly male in bed   HEAD:  Atraumatic, Normocephalic  EYES: EOMI, PERRLA, conjunctiva and sclera clear  ENMT: Moist mucous membranes  NECK: Supple, No JVD  NERVOUS SYSTEM:  Alert & Oriented X3, Motor Strength 4/5 B/L upper and lower extremities;  left upper ext edema,  DTRs 2+ intact and symmetric  CHEST/LUNG: no respiratory distress.   HEART: Regular rate and rhythm; No murmurs, rubs, or gallops  ABDOMEN: Soft, Nontender, Nondistended; Bowel sounds present + spc erythema   EXTREMITIES:  2+ Peripheral Pulses, No clubbing, cyanosis, or edema    LABS:                                                   9.3    11.6  )-----------( 204      ( 18 Nov 2017 08:26 )             28.7   11-18    141  |  110<H>  |  10  ----------------------------<  268<H>  4.0   |  24  |  0.96    Ca    7.7<L>      18 Nov 2017 08:26         CAPILLARY BLOOD GLUCOSE      RECENT CULTURES:      RADIOLOGY & ADDITIONAL TESTS:  Imaging Personally Reviewed:  [ ] YES      Consultant(s) Notes Reviewed:  [x ] YES     Care Discussed with [ ] Consultants [X ] Patient [ ] Family  [x ]    [x ]  Other; RN

## 2017-11-18 NOTE — PROGRESS NOTE ADULT - PROBLEM SELECTOR PLAN 1
appears to be improving and now formed . still has had several approx 4 -5 small formed bm in 24 hours   secondary to cdiff. diarrhea resolved . currently afebrile   sheryl d/w gi regarding d/c planning   continue vanco  continue flagyl and probiotic  per GI 11/14/17 started Dificid

## 2017-11-19 LAB
GLUCOSE BLDC GLUCOMTR-MCNC: 138 MG/DL — HIGH (ref 70–99)
GLUCOSE BLDC GLUCOMTR-MCNC: 172 MG/DL — HIGH (ref 70–99)
GLUCOSE BLDC GLUCOMTR-MCNC: 206 MG/DL — HIGH (ref 70–99)
GLUCOSE BLDC GLUCOMTR-MCNC: 217 MG/DL — HIGH (ref 70–99)

## 2017-11-19 PROCEDURE — 99232 SBSQ HOSP IP/OBS MODERATE 35: CPT

## 2017-11-19 RX ORDER — POTASSIUM PHOSPHATE, MONOBASIC POTASSIUM PHOSPHATE, DIBASIC 236; 224 MG/ML; MG/ML
15 INJECTION, SOLUTION INTRAVENOUS ONCE
Qty: 0 | Refills: 0 | Status: COMPLETED | OUTPATIENT
Start: 2017-11-19 | End: 2017-11-19

## 2017-11-19 RX ORDER — MAGNESIUM SULFATE 500 MG/ML
1 VIAL (ML) INJECTION ONCE
Qty: 0 | Refills: 0 | Status: COMPLETED | OUTPATIENT
Start: 2017-11-19 | End: 2017-11-19

## 2017-11-19 RX ADMIN — SERTRALINE 50 MILLIGRAM(S): 25 TABLET, FILM COATED ORAL at 10:51

## 2017-11-19 RX ADMIN — NYSTATIN CREAM 1 APPLICATION(S): 100000 CREAM TOPICAL at 05:57

## 2017-11-19 RX ADMIN — HEPARIN SODIUM 5000 UNIT(S): 5000 INJECTION INTRAVENOUS; SUBCUTANEOUS at 17:17

## 2017-11-19 RX ADMIN — NYSTATIN CREAM 1 APPLICATION(S): 100000 CREAM TOPICAL at 17:16

## 2017-11-19 RX ADMIN — FIDAXOMICIN 200 MILLIGRAM(S): 200 GRANULE, FOR SUSPENSION ORAL at 17:16

## 2017-11-19 RX ADMIN — Medication 100 GRAM(S): at 12:41

## 2017-11-19 RX ADMIN — HEPARIN SODIUM 5000 UNIT(S): 5000 INJECTION INTRAVENOUS; SUBCUTANEOUS at 05:56

## 2017-11-19 RX ADMIN — LOSARTAN POTASSIUM 25 MILLIGRAM(S): 100 TABLET, FILM COATED ORAL at 05:56

## 2017-11-19 RX ADMIN — FINASTERIDE 5 MILLIGRAM(S): 5 TABLET, FILM COATED ORAL at 10:51

## 2017-11-19 RX ADMIN — Medication 1 TABLET(S): at 17:16

## 2017-11-19 RX ADMIN — CARVEDILOL PHOSPHATE 6.25 MILLIGRAM(S): 80 CAPSULE, EXTENDED RELEASE ORAL at 17:16

## 2017-11-19 RX ADMIN — Medication 1 APPLICATION(S): at 10:51

## 2017-11-19 RX ADMIN — Medication 1 TABLET(S): at 09:52

## 2017-11-19 RX ADMIN — Medication 81 MILLIGRAM(S): at 10:51

## 2017-11-19 RX ADMIN — POTASSIUM PHOSPHATE, MONOBASIC POTASSIUM PHOSPHATE, DIBASIC 63.75 MILLIMOLE(S): 236; 224 INJECTION, SOLUTION INTRAVENOUS at 17:18

## 2017-11-19 RX ADMIN — Medication 20 MILLIGRAM(S): at 05:56

## 2017-11-19 RX ADMIN — FIDAXOMICIN 200 MILLIGRAM(S): 200 GRANULE, FOR SUSPENSION ORAL at 05:57

## 2017-11-19 RX ADMIN — CARVEDILOL PHOSPHATE 6.25 MILLIGRAM(S): 80 CAPSULE, EXTENDED RELEASE ORAL at 05:56

## 2017-11-19 NOTE — PROGRESS NOTE ADULT - SUBJECTIVE AND OBJECTIVE BOX
Pt doing much better on dificid  No diarrhea      MEDICATIONS  (STANDING):  aspirin enteric coated 81 milliGRAM(s) Oral daily  atorvastatin 10 milliGRAM(s) Oral at bedtime  BACItracin   Ointment 1 Application(s) Topical daily  carvedilol 6.25 milliGRAM(s) Oral every 12 hours  dextrose 5% + sodium chloride 0.9%. 1000 milliLiter(s) (100 mL/Hr) IV Continuous <Continuous>  dextrose 5%. 1000 milliLiter(s) (50 mL/Hr) IV Continuous <Continuous>  dextrose 50% Injectable 12.5 Gram(s) IV Push once  dextrose 50% Injectable 25 Gram(s) IV Push once  dextrose 50% Injectable 25 Gram(s) IV Push once  fidaxomicin 200 milliGRAM(s) Oral two times a day  finasteride 5 milliGRAM(s) Oral daily  furosemide    Tablet 20 milliGRAM(s) Oral daily  heparin  Injectable 5000 Unit(s) SubCutaneous every 12 hours  insulin lispro (HumaLOG) corrective regimen sliding scale   SubCutaneous three times a day before meals  insulin lispro (HumaLOG) corrective regimen sliding scale   SubCutaneous at bedtime  lactobacillus acidophilus 1 Tablet(s) Oral two times a day with meals  losartan 25 milliGRAM(s) Oral daily  nystatin Cream 1 Application(s) Topical two times a day  potassium phosphate IVPB 15 milliMole(s) IV Intermittent once  sertraline 50 milliGRAM(s) Oral daily    MEDICATIONS  (PRN):  acetaminophen   Tablet 650 milliGRAM(s) Oral every 6 hours PRN For Temp greater than 38 C (100.4 F)  dextrose Gel 1 Dose(s) Oral once PRN Blood Glucose LESS THAN 70 milliGRAM(s)/deciliter  glucagon  Injectable 1 milliGRAM(s) IntraMuscular once PRN Glucose LESS THAN 70 milligrams/deciliter      Allergies    Cipro I.V. (Other)  indomethacin (Other)  Keflex (Unknown)  Macrobid (Unknown)  Plavix (Diarrhea; Other; Rash)  pravastatin (Unknown)  sulfamethoxazole (Unknown)    Intolerances        Vital Signs Last 24 Hrs  T(C): 36.8 (19 Nov 2017 11:08), Max: 37.2 (18 Nov 2017 17:03)  T(F): 98.2 (19 Nov 2017 11:08), Max: 98.9 (18 Nov 2017 17:03)  HR: 66 (19 Nov 2017 11:08) (66 - 78)  BP: 140/73 (19 Nov 2017 11:08) (128/72 - 141/70)  BP(mean): --  RR: 16 (19 Nov 2017 11:08) (16 - 18)  SpO2: 96% (19 Nov 2017 11:08) (94% - 99%)    PHYSICAL EXAM:  General: NAD.  CVS: S1, S2  Chest: air entry bilaterally present  Abd: BS present, soft, non-tender      LABS:                        9.3    11.6  )-----------( 204      ( 18 Nov 2017 08:26 )             28.7     11-18    141  |  110<H>  |  10  ----------------------------<  268<H>  4.0   |  24  |  0.96    Ca    7.7<L>      18 Nov 2017 08:26  Phos  2.2     11-18  Mg     1.5     11-18      Pt to complete 14 day course of PO Dificid

## 2017-11-19 NOTE — PROGRESS NOTE ADULT - PROBLEM SELECTOR PLAN 7
renew home meds  Pt has suprapubic cath that needs to be changed.. informed urology to change Dr. Jett ( f/u )  .   use bacitracin and clean area

## 2017-11-19 NOTE — PROGRESS NOTE ADULT - PROBLEM SELECTOR PLAN 1
appears to be improving and now formed . still has had several approx 4 BM in 24 hour period..      secondary to cdiff. diarrhea resolved . currently afebrile  . reviewed GI hopefully approaching  d/c   continue vanco  continue flagyl and probiotic  per GI 11/14/17 started Dificid appears to be improving and now formed but still has had several approx 4 BM in 24 hour period..      secondary to cdiff. diarrhea resolved . currently afebrile  . reviewed GI hopefully approaching  d/c   continue vanco  continue flagyl and probiotic  per GI 11/14/17 started Dificid

## 2017-11-19 NOTE — PROGRESS NOTE ADULT - SUBJECTIVE AND OBJECTIVE BOX
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 States other people in rehab also had diarrhea. Denies fever, n/v, abd pain, lightheadedness/dizziness, cp, sob. No urinary symptoms (has suprapubic catheter). Can walk with walker, otherwise usual state of health. (09 Nov 2017 14:30) + for c diff       SUBJECTIVE & OBJECTIVE: Pt seen and examined at bedside. diarrhea better , formed stools but several a day    Vital Signs Last 24 Hrs  T(C): 36.4 (19 Nov 2017 05:02), Max: 37.2 (18 Nov 2017 17:03)  T(F): 97.5 (19 Nov 2017 05:02), Max: 98.9 (18 Nov 2017 17:03)  HR: 67 (19 Nov 2017 05:02) (67 - 78)  BP: 141/70 (19 Nov 2017 05:02) (128/72 - 141/70)  BP(mean): --  RR: 16 (19 Nov 2017 05:02) (16 - 18)  SpO2: 94% (19 Nov 2017 05:02) (94% - 99%)  PHYSICAL EXAM:    GENERAL: NAD, well-groomed, well-developed, elderly male in bed   HEAD:  Atraumatic, Normocephalic  EYES: EOMI, PERRLA, conjunctiva and sclera clear  ENMT: Moist mucous membranes  NECK: Supple, No JVD  NERVOUS SYSTEM:  Alert & Oriented X3, Motor Strength 4/5 B/L upper and lower extremities;  left upper ext edema,  DTRs 2+ intact and symmetric  CHEST/LUNG: no respiratory distress.   HEART: Regular rate and rhythm; No murmurs, rubs, or gallops  ABDOMEN: Soft, Nontender, Nondistended; Bowel sounds present + spc erythema   EXTREMITIES:  2+ Peripheral Pulses, No clubbing, cyanosis, or edema    LABS:             no new labs        CAPILLARY BLOOD GLUCOSE      RECENT CULTURES:      RADIOLOGY & ADDITIONAL TESTS:    < from: US Duplex Venous Upper Ext Ltd, Left (11.18.17 @ 11:43) >  IMPRESSION:     No evidence of left upper extremity deep venous thrombosis.          < end of copied text >    Imaging Personally Reviewed:  [ ] YES      Consultant(s) Notes Reviewed:  [x ] YES     Care Discussed with [ ] Consultants [X ] Patient [ ] Family  [x ]    [x ]  Other; RN CHIEF COMPLAINT/INTERVAL HISTORY:    Patient is a 89y old  Male who presents with a chief complaint of diarrhea (09 Nov 2017 14:30)      HPI:  89 y/o male pmh signif for HTN, HLD, CAD (s/p AICD placement after vt), DM-2 chronic urinary retention requiring chronic suprapubic cath, recent L3 fracture requiring stay in rehab, d/piper from rehab 4 days ago c/o 2 episodes of non bloody, watery stool per day for past 3 days. States other people in rehab also had diarrhea. Denies fever, n/v, abd pain, lightheadedness/dizziness, cp, sob. No urinary symptoms (has suprapubic catheter). Can walk with walker, otherwise usual state of health. (09 Nov 2017 14:30) + for c diff       SUBJECTIVE & OBJECTIVE: Pt seen and examined at bedside. diarrhea better , formed stools but several a day    Vital Signs Last 24 Hrs  T(C): 36.4 (19 Nov 2017 05:02), Max: 37.2 (18 Nov 2017 17:03)  T(F): 97.5 (19 Nov 2017 05:02), Max: 98.9 (18 Nov 2017 17:03)  HR: 67 (19 Nov 2017 05:02) (67 - 78)  BP: 141/70 (19 Nov 2017 05:02) (128/72 - 141/70)  BP(mean): --  RR: 16 (19 Nov 2017 05:02) (16 - 18)  SpO2: 94% (19 Nov 2017 05:02) (94% - 99%)  PHYSICAL EXAM:    GENERAL: NAD, well-groomed, well-developed, elderly male in bed , currently sitting in feces  HEAD:  Atraumatic, Normocephalic  EYES: EOMI, PERRLA, conjunctiva and sclera clear  ENMT: Moist mucous membranes  NECK: Supple, No JVD  NERVOUS SYSTEM:  Alert & Oriented X3, Motor Strength 4/5 B/L upper and lower extremities;  left upper ext edema,  DTRs 2+ intact and symmetric  CHEST/LUNG: no respiratory distress.   HEART: Regular rate and rhythm; No murmurs, rubs, or gallops  ABDOMEN: Soft, Nontender, Nondistended; Bowel sounds present + spc erythema   EXTREMITIES:  2+ Peripheral Pulses, No clubbing, cyanosis, or  improved edema of left upper ext     LABS:             no new labs        CAPILLARY BLOOD GLUCOSE      RECENT CULTURES:      RADIOLOGY & ADDITIONAL TESTS:    < from: US Duplex Venous Upper Ext Ltd, Left (11.18.17 @ 11:43) >  IMPRESSION:     No evidence of left upper extremity deep venous thrombosis.          < end of copied text >    Imaging Personally Reviewed:  [ ] YES      Consultant(s) Notes Reviewed:  [x ] YES     Care Discussed with [ ] Consultants [X ] Patient [ ] Family  [x ]    [x ]  Other; RN

## 2017-11-20 DIAGNOSIS — E43 UNSPECIFIED SEVERE PROTEIN-CALORIE MALNUTRITION: ICD-10-CM

## 2017-11-20 LAB
ANION GAP SERPL CALC-SCNC: 8 MMOL/L — SIGNIFICANT CHANGE UP (ref 5–17)
BUN SERPL-MCNC: 10 MG/DL — SIGNIFICANT CHANGE UP (ref 7–23)
CALCIUM SERPL-MCNC: 7.8 MG/DL — LOW (ref 8.5–10.1)
CHLORIDE SERPL-SCNC: 106 MMOL/L — SIGNIFICANT CHANGE UP (ref 96–108)
CO2 SERPL-SCNC: 24 MMOL/L — SIGNIFICANT CHANGE UP (ref 22–31)
CREAT SERPL-MCNC: 0.92 MG/DL — SIGNIFICANT CHANGE UP (ref 0.5–1.3)
GLUCOSE BLDC GLUCOMTR-MCNC: 218 MG/DL — HIGH (ref 70–99)
GLUCOSE SERPL-MCNC: 217 MG/DL — HIGH (ref 70–99)
HCT VFR BLD CALC: 30.3 % — LOW (ref 39–50)
HGB BLD-MCNC: 9.7 G/DL — LOW (ref 13–17)
MAGNESIUM SERPL-MCNC: 1.6 MG/DL — SIGNIFICANT CHANGE UP (ref 1.6–2.6)
MCHC RBC-ENTMCNC: 32 GM/DL — SIGNIFICANT CHANGE UP (ref 32–36)
MCHC RBC-ENTMCNC: 32.9 PG — SIGNIFICANT CHANGE UP (ref 27–34)
MCV RBC AUTO: 103 FL — HIGH (ref 80–100)
PHOSPHATE SERPL-MCNC: 2.9 MG/DL — SIGNIFICANT CHANGE UP (ref 2.5–4.5)
PLATELET # BLD AUTO: 223 K/UL — SIGNIFICANT CHANGE UP (ref 150–400)
POTASSIUM SERPL-MCNC: 4 MMOL/L — SIGNIFICANT CHANGE UP (ref 3.5–5.3)
POTASSIUM SERPL-SCNC: 4 MMOL/L — SIGNIFICANT CHANGE UP (ref 3.5–5.3)
RBC # BLD: 2.94 M/UL — LOW (ref 4.2–5.8)
RBC # FLD: 15.3 % — HIGH (ref 11–15)
SODIUM SERPL-SCNC: 138 MMOL/L — SIGNIFICANT CHANGE UP (ref 135–145)
WBC # BLD: 13.7 K/UL — HIGH (ref 3.8–10.5)
WBC # FLD AUTO: 13.7 K/UL — HIGH (ref 3.8–10.5)

## 2017-11-20 PROCEDURE — 99233 SBSQ HOSP IP/OBS HIGH 50: CPT

## 2017-11-20 RX ORDER — MAGNESIUM SULFATE 500 MG/ML
1 VIAL (ML) INJECTION ONCE
Qty: 0 | Refills: 0 | Status: COMPLETED | OUTPATIENT
Start: 2017-11-20 | End: 2017-11-20

## 2017-11-20 RX ADMIN — NYSTATIN CREAM 1 APPLICATION(S): 100000 CREAM TOPICAL at 05:45

## 2017-11-20 RX ADMIN — Medication 81 MILLIGRAM(S): at 11:31

## 2017-11-20 RX ADMIN — CARVEDILOL PHOSPHATE 6.25 MILLIGRAM(S): 80 CAPSULE, EXTENDED RELEASE ORAL at 05:45

## 2017-11-20 RX ADMIN — FINASTERIDE 5 MILLIGRAM(S): 5 TABLET, FILM COATED ORAL at 11:31

## 2017-11-20 RX ADMIN — HEPARIN SODIUM 5000 UNIT(S): 5000 INJECTION INTRAVENOUS; SUBCUTANEOUS at 05:45

## 2017-11-20 RX ADMIN — NYSTATIN CREAM 1 APPLICATION(S): 100000 CREAM TOPICAL at 17:47

## 2017-11-20 RX ADMIN — Medication 4: at 08:04

## 2017-11-20 RX ADMIN — Medication 1 TABLET(S): at 08:05

## 2017-11-20 RX ADMIN — Medication 6: at 11:30

## 2017-11-20 RX ADMIN — FIDAXOMICIN 200 MILLIGRAM(S): 200 GRANULE, FOR SUSPENSION ORAL at 05:45

## 2017-11-20 RX ADMIN — CARVEDILOL PHOSPHATE 6.25 MILLIGRAM(S): 80 CAPSULE, EXTENDED RELEASE ORAL at 17:49

## 2017-11-20 RX ADMIN — HEPARIN SODIUM 5000 UNIT(S): 5000 INJECTION INTRAVENOUS; SUBCUTANEOUS at 17:48

## 2017-11-20 RX ADMIN — Medication 1 APPLICATION(S): at 11:31

## 2017-11-20 RX ADMIN — LOSARTAN POTASSIUM 25 MILLIGRAM(S): 100 TABLET, FILM COATED ORAL at 05:45

## 2017-11-20 RX ADMIN — Medication 4: at 16:59

## 2017-11-20 RX ADMIN — ATORVASTATIN CALCIUM 10 MILLIGRAM(S): 80 TABLET, FILM COATED ORAL at 22:30

## 2017-11-20 RX ADMIN — FIDAXOMICIN 200 MILLIGRAM(S): 200 GRANULE, FOR SUSPENSION ORAL at 17:48

## 2017-11-20 RX ADMIN — Medication 1 TABLET(S): at 16:59

## 2017-11-20 RX ADMIN — SERTRALINE 50 MILLIGRAM(S): 25 TABLET, FILM COATED ORAL at 13:17

## 2017-11-20 RX ADMIN — Medication 100 GRAM(S): at 16:58

## 2017-11-20 RX ADMIN — SODIUM CHLORIDE 100 MILLILITER(S): 9 INJECTION, SOLUTION INTRAVENOUS at 16:58

## 2017-11-20 RX ADMIN — Medication 20 MILLIGRAM(S): at 05:45

## 2017-11-20 NOTE — PROGRESS NOTE ADULT - PROBLEM SELECTOR PLAN 9
pt hgb low but stable    occult blood negative times two   will continue to monitor . lasix 20 mg   acei   resumed coreg at 6.25 bid and not 25 bid for now and monitor bp. can uptitrate as needed

## 2017-11-20 NOTE — PROGRESS NOTE ADULT - SUBJECTIVE AND OBJECTIVE BOX
Pt stable - having formed stools  on DIFICID      MEDICATIONS  (STANDING):  aspirin enteric coated 81 milliGRAM(s) Oral daily  atorvastatin 10 milliGRAM(s) Oral at bedtime  BACItracin   Ointment 1 Application(s) Topical daily  carvedilol 6.25 milliGRAM(s) Oral every 12 hours  dextrose 5% + sodium chloride 0.9%. 1000 milliLiter(s) (100 mL/Hr) IV Continuous <Continuous>  dextrose 5%. 1000 milliLiter(s) (50 mL/Hr) IV Continuous <Continuous>  dextrose 50% Injectable 12.5 Gram(s) IV Push once  dextrose 50% Injectable 25 Gram(s) IV Push once  dextrose 50% Injectable 25 Gram(s) IV Push once  fidaxomicin 200 milliGRAM(s) Oral two times a day  finasteride 5 milliGRAM(s) Oral daily  furosemide    Tablet 20 milliGRAM(s) Oral daily  heparin  Injectable 5000 Unit(s) SubCutaneous every 12 hours  insulin lispro (HumaLOG) corrective regimen sliding scale   SubCutaneous three times a day before meals  insulin lispro (HumaLOG) corrective regimen sliding scale   SubCutaneous at bedtime  lactobacillus acidophilus 1 Tablet(s) Oral two times a day with meals  losartan 25 milliGRAM(s) Oral daily  magnesium sulfate  IVPB 1 Gram(s) IV Intermittent once  nystatin Cream 1 Application(s) Topical two times a day  sertraline 50 milliGRAM(s) Oral daily    MEDICATIONS  (PRN):  acetaminophen   Tablet 650 milliGRAM(s) Oral every 6 hours PRN For Temp greater than 38 C (100.4 F)  dextrose Gel 1 Dose(s) Oral once PRN Blood Glucose LESS THAN 70 milliGRAM(s)/deciliter  glucagon  Injectable 1 milliGRAM(s) IntraMuscular once PRN Glucose LESS THAN 70 milligrams/deciliter      Allergies    Cipro I.V. (Other)  indomethacin (Other)  Keflex (Unknown)  Macrobid (Unknown)  Plavix (Diarrhea; Other; Rash)  pravastatin (Unknown)  sulfamethoxazole (Unknown)    Intolerances        Vital Signs Last 24 Hrs  T(C): 36.9 (20 Nov 2017 11:04), Max: 36.9 (20 Nov 2017 11:04)  T(F): 98.4 (20 Nov 2017 11:04), Max: 98.4 (20 Nov 2017 11:04)  HR: 71 (20 Nov 2017 12:36) (60 - 78)  BP: 142/78 (20 Nov 2017 12:36) (132/76 - 159/97)  BP(mean): --  RR: 17 (20 Nov 2017 12:36) (16 - 17)  SpO2: 95% (20 Nov 2017 12:36) (94% - 96%)    PHYSICAL EXAM:  General: NAD.  CVS: S1, S2  Chest: air entry bilaterally present  Abd: BS present, soft, non-tender      LABS:                        9.7    13.7  )-----------( 223      ( 20 Nov 2017 07:46 )             30.3     11-20    138  |  106  |  10  ----------------------------<  217<H>  4.0   |  24  |  0.92    Ca    7.8<L>      20 Nov 2017 07:46  Phos  2.9     11-20  Mg     1.6     11-20        continue present diet  to complete 14d course of dificid

## 2017-11-20 NOTE — CHART NOTE - NSCHARTNOTEFT_GEN_A_CORE
Assessment:  Pt with C-diff resolved & colitis improving with stool formed today.  Last BM x 1(11/20). Pt requesting diet advanced to mechanical soft & thin liquids. Noted swallow evaluation d/c'd today. Pt's friend giving pt thin liquids & soft foods per pt's request. Would consider swallow evaluation to evaluates pt's safety to advance po diet; notified RN    Factors impacting intake: [ ] none [ ] nausea  [ ] vomiting [ ] diarrhea [ ] constipation  [x ]chewing problems [ ] swallowing issues  [ ] other:     Diet Prescription: Diet, Dysphagia 1 Pureed-Nectar Consistency Fluid:   Consistent Carbohydrate {Evening Snack}  Supplement Feeding Modality:  Oral  Health Shake No Sugar Cans or Servings Per Day:  1       Frequency:  Two Times a day (11-10-17 @ 15:09)    Intake: 50% meals & supplement     Current Weight:  62kg(11/20); no edema, Wt=62.5kg(11/13)  % Weight Change weight remains same x 1 week    Pertinent Medications: MEDICATIONS  (STANDING):  aspirin enteric coated 81 milliGRAM(s) Oral daily  atorvastatin 10 milliGRAM(s) Oral at bedtime  BACItracin   Ointment 1 Application(s) Topical daily  carvedilol 6.25 milliGRAM(s) Oral every 12 hours  dextrose 5% + sodium chloride 0.9%. 1000 milliLiter(s) (100 mL/Hr) IV Continuous <Continuous>  dextrose 5%. 1000 milliLiter(s) (50 mL/Hr) IV Continuous <Continuous>  dextrose 50% Injectable 12.5 Gram(s) IV Push once  dextrose 50% Injectable 25 Gram(s) IV Push once  dextrose 50% Injectable 25 Gram(s) IV Push once  fidaxomicin 200 milliGRAM(s) Oral two times a day  finasteride 5 milliGRAM(s) Oral daily  furosemide    Tablet 20 milliGRAM(s) Oral daily  heparin  Injectable 5000 Unit(s) SubCutaneous every 12 hours  insulin lispro (HumaLOG) corrective regimen sliding scale   SubCutaneous three times a day before meals  insulin lispro (HumaLOG) corrective regimen sliding scale   SubCutaneous at bedtime  lactobacillus acidophilus 1 Tablet(s) Oral two times a day with meals  losartan 25 milliGRAM(s) Oral daily  magnesium sulfate  IVPB 1 Gram(s) IV Intermittent once  nystatin Cream 1 Application(s) Topical two times a day  sertraline 50 milliGRAM(s) Oral daily    MEDICATIONS  (PRN):  acetaminophen   Tablet 650 milliGRAM(s) Oral every 6 hours PRN For Temp greater than 38 C (100.4 F)  dextrose Gel 1 Dose(s) Oral once PRN Blood Glucose LESS THAN 70 milliGRAM(s)/deciliter  glucagon  Injectable 1 milliGRAM(s) IntraMuscular once PRN Glucose LESS THAN 70 milligrams/deciliter    Pertinent Labs: 11-20 Na138 mmol/L Glu 217 mg/dL<H> K+ 4.0 mmol/L Cr  0.92 mg/dL BUN 10 mg/dL Phos 2.9 mg/dL Alb n/a   PAB n/a        CAPILLARY BLOOD GLUCOSE      POCT Blood Glucose.: 264 mg/dL (20 Nov 2017 11:01)  POCT Blood Glucose.: 218 mg/dL (20 Nov 2017 08:03)  POCT Blood Glucose.: 206 mg/dL (19 Nov 2017 21:41)  POCT Blood Glucose.: 217 mg/dL (19 Nov 2017 17:28)    Skin:  intact    Estimated Needs:   [x] no change since previous assessment  [ ] recalculated:     Previous Nutrition Diagnosis:   [ ] Inadequate Energy Intake [ ]Inadequate Oral Intake [ ] Excessive Energy Intake   [ ] Underweight [ ] Increased Nutrient Needs [ ] Overweight/Obesity   [ ] Altered GI Function [ ] Unintended Weight Loss [ ] Food & Nutrition Related Knowledge Deficit [x ] Chronic severe Malnutrition     Nutrition Diagnosis is [x ] ongoing  [ ] resolved [ ] not applicable     New Nutrition Diagnosis: [x ] not applicable       Interventions:   Recommend  [ ] Change Diet To:  [ ] Nutrition Supplement  [ ] Nutrition Support  [x ] Other: Swallow evaluation to reassess diet to advance diet Mechanical soft/thin liquids per pt's request    Monitoring and Evaluation:   [ x] PO intake [ x ] Tolerance to diet prescription [ x ] weights [ x ] labs[ x ] follow up per protocol  [ ] other: Assessment:  Pt with C-diff resolved & colitis improving with stool formed today.  Last BM x 1(11/20). Pt requesting diet advanced to mechanical soft & thin liquids. Noted swallow evaluation d/c'd today. Pt's friend giving pt thin liquids & soft foods per pt's request. Would consider swallow evaluation to evaluates pt's safety to advance po diet; notified RN    Factors impacting intake: [ ] none [ ] nausea  [ ] vomiting [ ] diarrhea [ ] constipation  [x ]chewing problems [ ] swallowing issues  [ ] other:     Diet Prescription: Diet, Dysphagia 1 Pureed-Nectar Consistency Fluid:   Consistent Carbohydrate {Evening Snack}  Supplement Feeding Modality:  Oral  Health Shake No Sugar Cans or Servings Per Day:  1       Frequency:  Two Times a day (11-10-17 @ 15:09)    Intake: 50% meals & supplement     Current Weight:  62kg(11/20); no edema, Wt=62.5kg(11/13)  % Weight Change weight remains same x 1 week    Pertinent Medications: MEDICATIONS  (STANDING):  aspirin enteric coated 81 milliGRAM(s) Oral daily  atorvastatin 10 milliGRAM(s) Oral at bedtime  BACItracin   Ointment 1 Application(s) Topical daily  carvedilol 6.25 milliGRAM(s) Oral every 12 hours  dextrose 5% + sodium chloride 0.9%. 1000 milliLiter(s) (100 mL/Hr) IV Continuous <Continuous>  dextrose 5%. 1000 milliLiter(s) (50 mL/Hr) IV Continuous <Continuous>  dextrose 50% Injectable 12.5 Gram(s) IV Push once  dextrose 50% Injectable 25 Gram(s) IV Push once  dextrose 50% Injectable 25 Gram(s) IV Push once  fidaxomicin 200 milliGRAM(s) Oral two times a day  finasteride 5 milliGRAM(s) Oral daily  furosemide    Tablet 20 milliGRAM(s) Oral daily  heparin  Injectable 5000 Unit(s) SubCutaneous every 12 hours  insulin lispro (HumaLOG) corrective regimen sliding scale   SubCutaneous three times a day before meals  insulin lispro (HumaLOG) corrective regimen sliding scale   SubCutaneous at bedtime  lactobacillus acidophilus 1 Tablet(s) Oral two times a day with meals  losartan 25 milliGRAM(s) Oral daily  magnesium sulfate  IVPB 1 Gram(s) IV Intermittent once  nystatin Cream 1 Application(s) Topical two times a day  sertraline 50 milliGRAM(s) Oral daily    MEDICATIONS  (PRN):  acetaminophen   Tablet 650 milliGRAM(s) Oral every 6 hours PRN For Temp greater than 38 C (100.4 F)  dextrose Gel 1 Dose(s) Oral once PRN Blood Glucose LESS THAN 70 milliGRAM(s)/deciliter  glucagon  Injectable 1 milliGRAM(s) IntraMuscular once PRN Glucose LESS THAN 70 milligrams/deciliter    Pertinent Labs: 11-20 Na138 mmol/L Glu 217 mg/dL<H> K+ 4.0 mmol/L Cr  0.92 mg/dL BUN 10 mg/dL Phos 2.9 mg/dL Alb n/a   PAB n/a        CAPILLARY BLOOD GLUCOSE      POCT Blood Glucose.: 264 mg/dL (20 Nov 2017 11:01)  POCT Blood Glucose.: 218 mg/dL (20 Nov 2017 08:03)  POCT Blood Glucose.: 206 mg/dL (19 Nov 2017 21:41)  POCT Blood Glucose.: 217 mg/dL (19 Nov 2017 17:28)    Skin:  intact    Estimated Needs:   [x] no change since previous assessment  [ ] recalculated:     Previous Nutrition Diagnosis:   [ ] Inadequate Energy Intake [ ]Inadequate Oral Intake [ ] Excessive Energy Intake   [ ] Underweight [ ] Increased Nutrient Needs [ ] Overweight/Obesity   [ ] Altered GI Function [ ] Unintended Weight Loss [ ] Food & Nutrition Related Knowledge Deficit [x ] Chronic severe Malnutrition     Nutrition Diagnosis is [x ] ongoing  [ ] resolved [ ] not applicable     New Nutrition Diagnosis: [x ] not applicable       Interventions:   Recommend  [x ] Change Diet To:  continue Puree/nectar thick liquids/[facility] with snack/diet health shake 2 x day until swallow evaluation  [ ] Nutrition Supplement  [ ] Nutrition Support  [x ] Other: Swallow evaluation to reassess diet to advance diet Mechanical soft/thin liquids per pt's request    Monitoring and Evaluation:   [ x] PO intake [ x ] Tolerance to diet prescription [ x ] weights [ x ] labs[ x ] follow up per protocol  [ ] other:

## 2017-11-20 NOTE — PROGRESS NOTE ADULT - PROBLEM SELECTOR PLAN 10
lasix 20 mg   acei   resumed coreg at 6.25 bid and not 25 bid for now and monitor bp. can uptitrate as needed healthy shake supplement  encourage PO intake

## 2017-11-20 NOTE — PROGRESS NOTE ADULT - PROBLEM SELECTOR PLAN 1
improving, stool formed today  secondary to cdiff. diarrhea resolved . currently afebrile    approaching  d/c   per GI 11/14/17 started Dificid

## 2017-11-20 NOTE — PROGRESS NOTE ADULT - SUBJECTIVE AND OBJECTIVE BOX
Patient is a 89y old  Male who presents with c diff colitis with copious diarrhea        SUBJECTIVE / OVERNIGHT EVENTS:  formed stool today, otherwise pt without complaints      MEDICATIONS  (STANDING):  aspirin enteric coated 81 milliGRAM(s) Oral daily  atorvastatin 10 milliGRAM(s) Oral at bedtime  BACItracin   Ointment 1 Application(s) Topical daily  carvedilol 6.25 milliGRAM(s) Oral every 12 hours  dextrose 5% + sodium chloride 0.9%. 1000 milliLiter(s) (100 mL/Hr) IV Continuous <Continuous>  dextrose 5%. 1000 milliLiter(s) (50 mL/Hr) IV Continuous <Continuous>  dextrose 50% Injectable 12.5 Gram(s) IV Push once  dextrose 50% Injectable 25 Gram(s) IV Push once  dextrose 50% Injectable 25 Gram(s) IV Push once  fidaxomicin 200 milliGRAM(s) Oral two times a day  finasteride 5 milliGRAM(s) Oral daily  furosemide    Tablet 20 milliGRAM(s) Oral daily  heparin  Injectable 5000 Unit(s) SubCutaneous every 12 hours  insulin lispro (HumaLOG) corrective regimen sliding scale   SubCutaneous three times a day before meals  insulin lispro (HumaLOG) corrective regimen sliding scale   SubCutaneous at bedtime  lactobacillus acidophilus 1 Tablet(s) Oral two times a day with meals  losartan 25 milliGRAM(s) Oral daily  nystatin Cream 1 Application(s) Topical two times a day  sertraline 50 milliGRAM(s) Oral daily    MEDICATIONS  (PRN):  acetaminophen   Tablet 650 milliGRAM(s) Oral every 6 hours PRN For Temp greater than 38 C (100.4 F)  dextrose Gel 1 Dose(s) Oral once PRN Blood Glucose LESS THAN 70 milliGRAM(s)/deciliter  glucagon  Injectable 1 milliGRAM(s) IntraMuscular once PRN Glucose LESS THAN 70 milligrams/deciliter      Vital Signs Last 24 Hrs  T(F): 98.4 (20 Nov 2017 11:04), Max: 98.4 (20 Nov 2017 11:04)  HR: 60 (20 Nov 2017 11:04) (60 - 78)  BP: 136/59 (20 Nov 2017 11:04) (132/76 - 159/97)  RR: 16 (20 Nov 2017 11:04) (16 - 16)  SpO2: 96% (20 Nov 2017 11:04) (94% - 96%)    CAPILLARY BLOOD GLUCOSE  POCT Blood Glucose.: 264 mg/dL (20 Nov 2017 11:01)  POCT Blood Glucose.: 218 mg/dL (20 Nov 2017 08:03)  POCT Blood Glucose.: 206 mg/dL (19 Nov 2017 21:41)  POCT Blood Glucose.: 217 mg/dL (19 Nov 2017 17:28)        PHYSICAL EXAM  GENERAL: NAD, well-developed  HEAD:  Atraumatic, Normocephalic  EYES: EOMI, PERRLA, conjunctiva and sclera clear  NECK: Supple, No JVD  CHEST/LUNG: Clear to auscultation bilaterally; No wheeze  HEART: Regular rate and rhythm; No murmurs, rubs, or gallops  ABDOMEN: Soft, Nontender, Nondistended; Bowel sounds present; +SPC, draining clear, yellow urine  EXTREMITIES: No clubbing, cyanosis, or edema  PSYCH: AAOx3      LABS:                        9.7    13.7  )-----------( 223      ( 20 Nov 2017 07:46 )             30.3     11-20    138  |  106  |  10  ----------------------------<  217<H>  4.0   |  24  |  0.92    Ca    7.8<L>      20 Nov 2017 07:46  Phos  2.9     11-20  Mg     1.6     11-20

## 2017-11-20 NOTE — PROGRESS NOTE ADULT - PROBLEM SELECTOR PROBLEM 7
Enlarged prostate Type 2 diabetes mellitus without complication, without long-term current use of insulin

## 2017-11-21 LAB
ANION GAP SERPL CALC-SCNC: 9 MMOL/L — SIGNIFICANT CHANGE UP (ref 5–17)
BUN SERPL-MCNC: 9 MG/DL — SIGNIFICANT CHANGE UP (ref 7–23)
CALCIUM SERPL-MCNC: 7.9 MG/DL — LOW (ref 8.5–10.1)
CHLORIDE SERPL-SCNC: 107 MMOL/L — SIGNIFICANT CHANGE UP (ref 96–108)
CO2 SERPL-SCNC: 24 MMOL/L — SIGNIFICANT CHANGE UP (ref 22–31)
CREAT SERPL-MCNC: 0.87 MG/DL — SIGNIFICANT CHANGE UP (ref 0.5–1.3)
GLUCOSE SERPL-MCNC: 217 MG/DL — HIGH (ref 70–99)
HCT VFR BLD CALC: 29.2 % — LOW (ref 39–50)
HGB BLD-MCNC: 9.3 G/DL — LOW (ref 13–17)
MCHC RBC-ENTMCNC: 31.7 GM/DL — LOW (ref 32–36)
MCHC RBC-ENTMCNC: 32 PG — SIGNIFICANT CHANGE UP (ref 27–34)
MCV RBC AUTO: 100.9 FL — HIGH (ref 80–100)
PLATELET # BLD AUTO: 223 K/UL — SIGNIFICANT CHANGE UP (ref 150–400)
POTASSIUM SERPL-MCNC: 3.9 MMOL/L — SIGNIFICANT CHANGE UP (ref 3.5–5.3)
POTASSIUM SERPL-SCNC: 3.9 MMOL/L — SIGNIFICANT CHANGE UP (ref 3.5–5.3)
RBC # BLD: 2.89 M/UL — LOW (ref 4.2–5.8)
RBC # FLD: 15.7 % — HIGH (ref 11–15)
SODIUM SERPL-SCNC: 140 MMOL/L — SIGNIFICANT CHANGE UP (ref 135–145)
WBC # BLD: 10.1 K/UL — SIGNIFICANT CHANGE UP (ref 3.8–10.5)
WBC # FLD AUTO: 10.1 K/UL — SIGNIFICANT CHANGE UP (ref 3.8–10.5)

## 2017-11-21 PROCEDURE — 99233 SBSQ HOSP IP/OBS HIGH 50: CPT

## 2017-11-21 RX ADMIN — Medication 1 APPLICATION(S): at 11:17

## 2017-11-21 RX ADMIN — HEPARIN SODIUM 5000 UNIT(S): 5000 INJECTION INTRAVENOUS; SUBCUTANEOUS at 17:58

## 2017-11-21 RX ADMIN — CARVEDILOL PHOSPHATE 6.25 MILLIGRAM(S): 80 CAPSULE, EXTENDED RELEASE ORAL at 17:57

## 2017-11-21 RX ADMIN — Medication 8: at 11:16

## 2017-11-21 RX ADMIN — FIDAXOMICIN 200 MILLIGRAM(S): 200 GRANULE, FOR SUSPENSION ORAL at 05:39

## 2017-11-21 RX ADMIN — SODIUM CHLORIDE 100 MILLILITER(S): 9 INJECTION, SOLUTION INTRAVENOUS at 03:39

## 2017-11-21 RX ADMIN — NYSTATIN CREAM 1 APPLICATION(S): 100000 CREAM TOPICAL at 05:44

## 2017-11-21 RX ADMIN — Medication 1 TABLET(S): at 16:55

## 2017-11-21 RX ADMIN — NYSTATIN CREAM 1 APPLICATION(S): 100000 CREAM TOPICAL at 17:58

## 2017-11-21 RX ADMIN — FINASTERIDE 5 MILLIGRAM(S): 5 TABLET, FILM COATED ORAL at 11:21

## 2017-11-21 RX ADMIN — SERTRALINE 50 MILLIGRAM(S): 25 TABLET, FILM COATED ORAL at 11:21

## 2017-11-21 RX ADMIN — Medication 4: at 07:55

## 2017-11-21 RX ADMIN — CARVEDILOL PHOSPHATE 6.25 MILLIGRAM(S): 80 CAPSULE, EXTENDED RELEASE ORAL at 05:39

## 2017-11-21 RX ADMIN — Medication 81 MILLIGRAM(S): at 11:21

## 2017-11-21 RX ADMIN — Medication 4: at 16:55

## 2017-11-21 RX ADMIN — Medication 1 TABLET(S): at 07:56

## 2017-11-21 RX ADMIN — FIDAXOMICIN 200 MILLIGRAM(S): 200 GRANULE, FOR SUSPENSION ORAL at 17:57

## 2017-11-21 RX ADMIN — LOSARTAN POTASSIUM 25 MILLIGRAM(S): 100 TABLET, FILM COATED ORAL at 05:39

## 2017-11-21 RX ADMIN — ATORVASTATIN CALCIUM 10 MILLIGRAM(S): 80 TABLET, FILM COATED ORAL at 22:07

## 2017-11-21 RX ADMIN — HEPARIN SODIUM 5000 UNIT(S): 5000 INJECTION INTRAVENOUS; SUBCUTANEOUS at 05:39

## 2017-11-21 RX ADMIN — Medication 20 MILLIGRAM(S): at 05:39

## 2017-11-21 NOTE — PROGRESS NOTE ADULT - SUBJECTIVE AND OBJECTIVE BOX
Pt doing well - formed BM  on dificid      MEDICATIONS  (STANDING):  aspirin enteric coated 81 milliGRAM(s) Oral daily  atorvastatin 10 milliGRAM(s) Oral at bedtime  BACItracin   Ointment 1 Application(s) Topical daily  carvedilol 6.25 milliGRAM(s) Oral every 12 hours  dextrose 5% + sodium chloride 0.9%. 1000 milliLiter(s) (100 mL/Hr) IV Continuous <Continuous>  dextrose 5%. 1000 milliLiter(s) (50 mL/Hr) IV Continuous <Continuous>  dextrose 50% Injectable 12.5 Gram(s) IV Push once  dextrose 50% Injectable 25 Gram(s) IV Push once  dextrose 50% Injectable 25 Gram(s) IV Push once  fidaxomicin 200 milliGRAM(s) Oral two times a day  finasteride 5 milliGRAM(s) Oral daily  furosemide    Tablet 20 milliGRAM(s) Oral daily  heparin  Injectable 5000 Unit(s) SubCutaneous every 12 hours  insulin lispro (HumaLOG) corrective regimen sliding scale   SubCutaneous three times a day before meals  insulin lispro (HumaLOG) corrective regimen sliding scale   SubCutaneous at bedtime  lactobacillus acidophilus 1 Tablet(s) Oral two times a day with meals  losartan 25 milliGRAM(s) Oral daily  nystatin Cream 1 Application(s) Topical two times a day  sertraline 50 milliGRAM(s) Oral daily    MEDICATIONS  (PRN):  acetaminophen   Tablet 650 milliGRAM(s) Oral every 6 hours PRN For Temp greater than 38 C (100.4 F)  dextrose Gel 1 Dose(s) Oral once PRN Blood Glucose LESS THAN 70 milliGRAM(s)/deciliter  glucagon  Injectable 1 milliGRAM(s) IntraMuscular once PRN Glucose LESS THAN 70 milligrams/deciliter      Allergies    Cipro I.V. (Other)  indomethacin (Other)  Keflex (Unknown)  Macrobid (Unknown)  Plavix (Diarrhea; Other; Rash)  pravastatin (Unknown)  sulfamethoxazole (Unknown)    Intolerances        Vital Signs Last 24 Hrs  T(C): 36.9 (21 Nov 2017 05:26), Max: 36.9 (20 Nov 2017 11:04)  T(F): 98.4 (21 Nov 2017 05:26), Max: 98.4 (20 Nov 2017 11:04)  HR: 78 (21 Nov 2017 05:26) (60 - 78)  BP: 163/88 (21 Nov 2017 05:26) (136/59 - 163/88)  BP(mean): --  RR: 18 (21 Nov 2017 05:26) (16 - 18)  SpO2: 96% (21 Nov 2017 05:26) (95% - 96%)    PHYSICAL EXAM:  General: NAD.  CVS: S1, S2  Chest: air entry bilaterally present  Abd: BS present, soft, non-tender      LABS:                        9.3    10.1  )-----------( 223      ( 21 Nov 2017 07:12 )             29.2     11-21    140  |  107  |  9   ----------------------------<  217<H>  3.9   |  24  |  0.87    Ca    7.9<L>      21 Nov 2017 07:12  Phos  2.9     11-20  Mg     1.6     11-20      Tolerating diet  continue dificid x 14 days

## 2017-11-21 NOTE — PROGRESS NOTE ADULT - SUBJECTIVE AND OBJECTIVE BOX
Patient is a 89y old  Male who presents with c diff colitis        SUBJECTIVE / OVERNIGHT EVENTS: stools soft, marcelino PO; await  for APC change      MEDICATIONS  (STANDING):  aspirin enteric coated 81 milliGRAM(s) Oral daily  atorvastatin 10 milliGRAM(s) Oral at bedtime  BACItracin   Ointment 1 Application(s) Topical daily  carvedilol 6.25 milliGRAM(s) Oral every 12 hours  dextrose 5% + sodium chloride 0.9%. 1000 milliLiter(s) (100 mL/Hr) IV Continuous <Continuous>  dextrose 5%. 1000 milliLiter(s) (50 mL/Hr) IV Continuous <Continuous>  dextrose 50% Injectable 12.5 Gram(s) IV Push once  dextrose 50% Injectable 25 Gram(s) IV Push once  dextrose 50% Injectable 25 Gram(s) IV Push once  fidaxomicin 200 milliGRAM(s) Oral two times a day  finasteride 5 milliGRAM(s) Oral daily  furosemide    Tablet 20 milliGRAM(s) Oral daily  heparin  Injectable 5000 Unit(s) SubCutaneous every 12 hours  insulin lispro (HumaLOG) corrective regimen sliding scale   SubCutaneous three times a day before meals  insulin lispro (HumaLOG) corrective regimen sliding scale   SubCutaneous at bedtime  lactobacillus acidophilus 1 Tablet(s) Oral two times a day with meals  losartan 25 milliGRAM(s) Oral daily  nystatin Cream 1 Application(s) Topical two times a day  sertraline 50 milliGRAM(s) Oral daily    MEDICATIONS  (PRN):  acetaminophen   Tablet 650 milliGRAM(s) Oral every 6 hours PRN For Temp greater than 38 C (100.4 F)  dextrose Gel 1 Dose(s) Oral once PRN Blood Glucose LESS THAN 70 milliGRAM(s)/deciliter  glucagon  Injectable 1 milliGRAM(s) IntraMuscular once PRN Glucose LESS THAN 70 milligrams/deciliter      Vital Signs Last 24 Hrs  T(F): 98.4 (21 Nov 2017 05:26), Max: 98.4 (20 Nov 2017 11:04)  HR: 78 (21 Nov 2017 05:26) (60 - 78)  BP: 163/88 (21 Nov 2017 05:26) (136/59 - 163/88)  RR: 18 (21 Nov 2017 05:26) (16 - 18)  SpO2: 96% (21 Nov 2017 05:26) (95% - 96%)    CAPILLARY BLOOD GLUCOSE  POCT Blood Glucose.: 250 mg/dL (21 Nov 2017 07:54)  POCT Blood Glucose.: 199 mg/dL (20 Nov 2017 22:29)  POCT Blood Glucose.: 207 mg/dL (20 Nov 2017 16:31)  POCT Blood Glucose.: 264 mg/dL (20 Nov 2017 11:01)          PHYSICAL EXAM  GENERAL: NAD, well-developed  HEAD:  Atraumatic, Normocephalic  EYES: EOMI, PERRLA, conjunctiva and sclera clear  NECK: Supple, No JVD  CHEST/LUNG: Clear to auscultation bilaterally; No wheeze  HEART: Regular rate and rhythm; No murmurs, rubs, or gallops  ABDOMEN: Soft, Nontender, Nondistended; Bowel sounds present  EXTREMITIES:  2+ Peripheral Pulses, No clubbing, cyanosis, or edema  PSYCH: AAOx3  SKIN: No rashes or lesions    LABS:                        9.3    10.1  )-----------( 223      ( 21 Nov 2017 07:12 )             29.2     11-21    140  |  107  |  9   ----------------------------<  217<H>  3.9   |  24  |  0.87

## 2017-11-22 ENCOUNTER — TRANSCRIPTION ENCOUNTER (OUTPATIENT)
Age: 82
End: 2017-11-22

## 2017-11-22 PROCEDURE — 99232 SBSQ HOSP IP/OBS MODERATE 35: CPT

## 2017-11-22 RX ADMIN — FINASTERIDE 5 MILLIGRAM(S): 5 TABLET, FILM COATED ORAL at 11:38

## 2017-11-22 RX ADMIN — NYSTATIN CREAM 1 APPLICATION(S): 100000 CREAM TOPICAL at 06:10

## 2017-11-22 RX ADMIN — Medication 1 APPLICATION(S): at 11:38

## 2017-11-22 RX ADMIN — Medication 8: at 11:39

## 2017-11-22 RX ADMIN — HEPARIN SODIUM 5000 UNIT(S): 5000 INJECTION INTRAVENOUS; SUBCUTANEOUS at 06:06

## 2017-11-22 RX ADMIN — SODIUM CHLORIDE 100 MILLILITER(S): 9 INJECTION, SOLUTION INTRAVENOUS at 13:54

## 2017-11-22 RX ADMIN — Medication 2: at 17:55

## 2017-11-22 RX ADMIN — CARVEDILOL PHOSPHATE 6.25 MILLIGRAM(S): 80 CAPSULE, EXTENDED RELEASE ORAL at 17:56

## 2017-11-22 RX ADMIN — FIDAXOMICIN 200 MILLIGRAM(S): 200 GRANULE, FOR SUSPENSION ORAL at 17:56

## 2017-11-22 RX ADMIN — Medication 1 TABLET(S): at 17:56

## 2017-11-22 RX ADMIN — LOSARTAN POTASSIUM 25 MILLIGRAM(S): 100 TABLET, FILM COATED ORAL at 06:06

## 2017-11-22 RX ADMIN — FIDAXOMICIN 200 MILLIGRAM(S): 200 GRANULE, FOR SUSPENSION ORAL at 06:06

## 2017-11-22 RX ADMIN — NYSTATIN CREAM 1 APPLICATION(S): 100000 CREAM TOPICAL at 17:56

## 2017-11-22 RX ADMIN — Medication 81 MILLIGRAM(S): at 11:38

## 2017-11-22 RX ADMIN — Medication 4: at 08:06

## 2017-11-22 RX ADMIN — HEPARIN SODIUM 5000 UNIT(S): 5000 INJECTION INTRAVENOUS; SUBCUTANEOUS at 17:55

## 2017-11-22 RX ADMIN — CARVEDILOL PHOSPHATE 6.25 MILLIGRAM(S): 80 CAPSULE, EXTENDED RELEASE ORAL at 06:06

## 2017-11-22 RX ADMIN — Medication 20 MILLIGRAM(S): at 06:06

## 2017-11-22 RX ADMIN — ATORVASTATIN CALCIUM 10 MILLIGRAM(S): 80 TABLET, FILM COATED ORAL at 22:05

## 2017-11-22 RX ADMIN — Medication 1 TABLET(S): at 11:38

## 2017-11-22 RX ADMIN — SERTRALINE 50 MILLIGRAM(S): 25 TABLET, FILM COATED ORAL at 11:39

## 2017-11-22 NOTE — PROGRESS NOTE ADULT - SUBJECTIVE AND OBJECTIVE BOX
Stable - no diarrhea  tolerating diet  on dificid      MEDICATIONS  (STANDING):  aspirin enteric coated 81 milliGRAM(s) Oral daily  atorvastatin 10 milliGRAM(s) Oral at bedtime  BACItracin   Ointment 1 Application(s) Topical daily  carvedilol 6.25 milliGRAM(s) Oral every 12 hours  dextrose 5% + sodium chloride 0.9%. 1000 milliLiter(s) (100 mL/Hr) IV Continuous <Continuous>  dextrose 5%. 1000 milliLiter(s) (50 mL/Hr) IV Continuous <Continuous>  dextrose 50% Injectable 12.5 Gram(s) IV Push once  dextrose 50% Injectable 25 Gram(s) IV Push once  dextrose 50% Injectable 25 Gram(s) IV Push once  fidaxomicin 200 milliGRAM(s) Oral two times a day  finasteride 5 milliGRAM(s) Oral daily  furosemide    Tablet 20 milliGRAM(s) Oral daily  heparin  Injectable 5000 Unit(s) SubCutaneous every 12 hours  insulin lispro (HumaLOG) corrective regimen sliding scale   SubCutaneous three times a day before meals  insulin lispro (HumaLOG) corrective regimen sliding scale   SubCutaneous at bedtime  lactobacillus acidophilus 1 Tablet(s) Oral two times a day with meals  losartan 25 milliGRAM(s) Oral daily  nystatin Cream 1 Application(s) Topical two times a day  sertraline 50 milliGRAM(s) Oral daily    MEDICATIONS  (PRN):  acetaminophen   Tablet 650 milliGRAM(s) Oral every 6 hours PRN For Temp greater than 38 C (100.4 F)  dextrose Gel 1 Dose(s) Oral once PRN Blood Glucose LESS THAN 70 milliGRAM(s)/deciliter  glucagon  Injectable 1 milliGRAM(s) IntraMuscular once PRN Glucose LESS THAN 70 milligrams/deciliter      Allergies    Cipro I.V. (Other)  indomethacin (Other)  Keflex (Unknown)  Macrobid (Unknown)  Plavix (Diarrhea; Other; Rash)  pravastatin (Unknown)  sulfamethoxazole (Unknown)    Intolerances        Vital Signs Last 24 Hrs  T(C): 36.8 (22 Nov 2017 05:20), Max: 36.8 (22 Nov 2017 05:20)  T(F): 98.2 (22 Nov 2017 05:20), Max: 98.2 (22 Nov 2017 05:20)  HR: 73 (22 Nov 2017 05:20) (66 - 76)  BP: 153/84 (22 Nov 2017 05:20) (148/76 - 153/84)  BP(mean): --  RR: 16 (22 Nov 2017 05:20) (16 - 19)  SpO2: 98% (22 Nov 2017 05:20) (95% - 98%)    PHYSICAL EXAM:  General: NAD.  CVS: S1, S2  Chest: air entry bilaterally present  Abd: BS present, soft, non-tender      LABS:                        9.3    10.1  )-----------( 223      ( 21 Nov 2017 07:12 )             29.2     11-21    140  |  107  |  9   ----------------------------<  217<H>  3.9   |  24  |  0.87    Ca    7.9<L>      21 Nov 2017 07:12        stable from GI standpoint  finish 14d course of Dificid

## 2017-11-22 NOTE — CONSULT NOTE ADULT - SUBJECTIVE AND OBJECTIVE BOX
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 States other people in rehab also had diarrhea. Denies fever, n/v, abd pain, lightheadedness/dizziness, cp, sob. No urinary symptoms (has suprapubic catheter). Can walk with walker, otherwise usual state of health. (09 Nov 2017 14:30)    Suprapubic catheter is partially blocked and purple in color.      PAST MEDICAL & SURGICAL HISTORY:  Hypertension  High cholesterol  Depression  Enlarged prostate  Heart attack: 1985  Diabetes mellitus  Artificial pacemaker  S/P cataract extraction and insertion of intraocular lens, right: November 2013  Suprapubic catheter: 2012  Enlarged prostate: had surgery  S/P inguinal hernia repair: left  Fracture: left  &amp; had surgery      Allergies    Cipro I.V. (Other)  indomethacin (Other)  Keflex (Unknown)  Macrobid (Unknown)  Plavix (Diarrhea; Other; Rash)  pravastatin (Unknown)  sulfamethoxazole (Unknown)    SOCIAL HISTORY  FAMILY HISTORY:  No pertinent family history in first degree relatives      Home Medications:  Aspirin Low Dose 81 mg oral delayed release tablet: 1 tab(s) orally once a day (19 Mar 2016 17:58)  Dulcolax Stool Softener 100 mg oral capsule:  orally , As Needed (27 Oct 2016 21:05)  finasteride 5 mg oral tablet: 1 tab(s) orally once a day (19 Mar 2016 17:58)  glipiZIDE 10 mg oral tablet: 1 tab(s) orally 2 times a day (19 Mar 2016 17:58)  Lipitor 10 mg oral tablet: 1 tab(s) orally once a day (at bedtime) (19 Mar 2016 17:58)  losartan 50 mg oral tablet:  orally  (19 Mar 2016 17:58)  metFORMIN 500 mg oral tablet, extended release:  orally  (19 Mar 2016 17:58)  multivitamin: 1 tab(s) orally once a day (19 Mar 2016 17:58)  sertraline 50 mg oral tablet: 1 tab(s) orally once a day (19 Mar 2016 17:58)  Vitamin B12 1000 mcg oral tablet: 1 tab(s) orally once a day (19 Mar 2016 17:58)      MEDICATIONS  (STANDING):  aspirin enteric coated 81 milliGRAM(s) Oral daily  atorvastatin 10 milliGRAM(s) Oral at bedtime  BACItracin   Ointment 1 Application(s) Topical daily  carvedilol 6.25 milliGRAM(s) Oral every 12 hours  dextrose 5% + sodium chloride 0.9%. 1000 milliLiter(s) (100 mL/Hr) IV Continuous <Continuous>  dextrose 5%. 1000 milliLiter(s) (50 mL/Hr) IV Continuous <Continuous>  dextrose 50% Injectable 12.5 Gram(s) IV Push once  dextrose 50% Injectable 25 Gram(s) IV Push once  dextrose 50% Injectable 25 Gram(s) IV Push once  fidaxomicin 200 milliGRAM(s) Oral two times a day  finasteride 5 milliGRAM(s) Oral daily  furosemide    Tablet 20 milliGRAM(s) Oral daily  heparin  Injectable 5000 Unit(s) SubCutaneous every 12 hours  insulin lispro (HumaLOG) corrective regimen sliding scale   SubCutaneous three times a day before meals  insulin lispro (HumaLOG) corrective regimen sliding scale   SubCutaneous at bedtime  lactobacillus acidophilus 1 Tablet(s) Oral two times a day with meals  losartan 25 milliGRAM(s) Oral daily  nystatin Cream 1 Application(s) Topical two times a day  sertraline 50 milliGRAM(s) Oral daily    MEDICATIONS  (PRN):  acetaminophen   Tablet 650 milliGRAM(s) Oral every 6 hours PRN For Temp greater than 38 C (100.4 F)  dextrose Gel 1 Dose(s) Oral once PRN Blood Glucose LESS THAN 70 milliGRAM(s)/deciliter  glucagon  Injectable 1 milliGRAM(s) IntraMuscular once PRN Glucose LESS THAN 70 milligrams/deciliter      ROS:    General:  No wt loss, fevers, chills, night sweats  ENT:  No sore throat, pain, runny nose,   CV:  No pain, palpitatioins,  Resp:  No dyspnea, cough, tachypnea, wheezing  GI:  No pain, nausea, vomiting, diarrhea, constipatiion  :  No pain, bleeding, incontinence, nocturia, frequency, cystostomy in place.  Neuro:  No weakness, tingling,   Endocrine:  No polyuria, polydypsia, cold/heat intolerance  Skin:  No rash,  edema      Physical Exam:    Vital Signs:  Vital Signs Last 24 Hrs  T(C): 36.2 (22 Nov 2017 11:30), Max: 36.8 (22 Nov 2017 05:20)  T(F): 97.1 (22 Nov 2017 11:30), Max: 98.2 (22 Nov 2017 05:20)  HR: 60 (22 Nov 2017 11:30) (60 - 76)  BP: 137/81 (22 Nov 2017 11:30) (137/81 - 153/84)  BP(mean): --  RR: 18 (22 Nov 2017 11:30) (16 - 18)  SpO2: 95% (22 Nov 2017 11:30) (95% - 98%)  Daily     Daily   I&O's Summary    21 Nov 2017 07:01  -  22 Nov 2017 07:00  --------------------------------------------------------  IN: 500 mL / OUT: 2800 mL / NET: -2300 mL    22 Nov 2017 07:01  -  22 Nov 2017 16:32  --------------------------------------------------------  IN: 120 mL / OUT: 1700 mL / NET: -1580 mL        General:  Appears stated age,  well-nourished, no distress  HEENT:  NC/AT, patent nares w/ pink mucosa, OP moist and pink,   conjunctivae clear  Chest:  Full & symmetric excursion, no increased effort.   Abdomen:  Soft, non-tender, non-distended, normoactive bowel sounds.  Genitalia: Uncircumcised Phallus, Adequate meatus, no hypospadias. Both testicles descended, non tender, no mass. No epididymitis or epididymal mass. No hydrocele. Cystostomy in place.  Rectal Examination: Deferred.  Extremities:  no edema, pedal pusation are present, no calf tenderness.  Skin:  No rash/erythema  Neuro/Psych:  Alert and conscious. Grossly intact and symmetrical.      LABS:                        9.3    10.1  )-----------( 223      ( 21 Nov 2017 07:12 )             29.2     11-21    140  |  107  |  9   ----------------------------<  217<H>  3.9   |  24  |  0.87    Ca    7.9<L>      21 Nov 2017 07:12              RADIOLOGY & ADDITIONAL STUDIES:

## 2017-11-22 NOTE — PROGRESS NOTE ADULT - SUBJECTIVE AND OBJECTIVE BOX
Patient is a 89y old  Male who presents with c diff diarrhea        SUBJECTIVE / OVERNIGHT EVENTS: cont with formed stool; family working with  for dispo      MEDICATIONS  (STANDING):  aspirin enteric coated 81 milliGRAM(s) Oral daily  atorvastatin 10 milliGRAM(s) Oral at bedtime  BACItracin   Ointment 1 Application(s) Topical daily  carvedilol 6.25 milliGRAM(s) Oral every 12 hours  dextrose 5% + sodium chloride 0.9%. 1000 milliLiter(s) (100 mL/Hr) IV Continuous <Continuous>  dextrose 5%. 1000 milliLiter(s) (50 mL/Hr) IV Continuous <Continuous>  dextrose 50% Injectable 12.5 Gram(s) IV Push once  dextrose 50% Injectable 25 Gram(s) IV Push once  dextrose 50% Injectable 25 Gram(s) IV Push once  fidaxomicin 200 milliGRAM(s) Oral two times a day  finasteride 5 milliGRAM(s) Oral daily  furosemide    Tablet 20 milliGRAM(s) Oral daily  heparin  Injectable 5000 Unit(s) SubCutaneous every 12 hours  insulin lispro (HumaLOG) corrective regimen sliding scale   SubCutaneous three times a day before meals  insulin lispro (HumaLOG) corrective regimen sliding scale   SubCutaneous at bedtime  lactobacillus acidophilus 1 Tablet(s) Oral two times a day with meals  losartan 25 milliGRAM(s) Oral daily  nystatin Cream 1 Application(s) Topical two times a day  sertraline 50 milliGRAM(s) Oral daily    MEDICATIONS  (PRN):  acetaminophen   Tablet 650 milliGRAM(s) Oral every 6 hours PRN For Temp greater than 38 C (100.4 F)  dextrose Gel 1 Dose(s) Oral once PRN Blood Glucose LESS THAN 70 milliGRAM(s)/deciliter  glucagon  Injectable 1 milliGRAM(s) IntraMuscular once PRN Glucose LESS THAN 70 milligrams/deciliter      Vital Signs Last 24 Hrs    T(F): 98.2 (22 Nov 2017 05:20), Max: 98.2 (22 Nov 2017 05:20)  HR: 73 (22 Nov 2017 05:20) (66 - 76)  BP: 153/84 (22 Nov 2017 05:20) (148/76 - 153/84)  RR: 16 (22 Nov 2017 05:20) (16 - 19)  SpO2: 98% (22 Nov 2017 05:20) (95% - 98%)    CAPILLARY BLOOD GLUCOSE  POCT Blood Glucose.: 214 mg/dL (22 Nov 2017 08:04)  POCT Blood Glucose.: 175 mg/dL (21 Nov 2017 22:08)  POCT Blood Glucose.: 231 mg/dL (21 Nov 2017 16:18)        PHYSICAL EXAM  GENERAL: NAD, well-developed  HEAD:  Atraumatic, Normocephalic  EYES: EOMI, PERRLA, conjunctiva and sclera clear  CHEST/LUNG: b/l AE  HEART: Regular rate and rhythm; No murmurs, rubs, or gallops  ABDOMEN: Soft, Nontender, Nondistended; Bowel sounds present  EXTREMITIES:  No clubbing, cyanosis, or edema  PSYCH: AAOx3      LABS:

## 2017-11-22 NOTE — PROGRESS NOTE ADULT - PROBLEM SELECTOR PLAN 1
improving, stool formed today  secondary to cdiff. diarrhea resolved . currently afebrile    to complete dificid 11/28  per GI 11/14/17 started Dificid

## 2017-11-22 NOTE — PROGRESS NOTE ADULT - ATTENDING COMMENTS
on dificid now by GI   diarrhea improving  call prn as GI managing c diff colitis
family working with CM for dispo.  Pt has used his rehab days; pt not a candidate for assisted living; family looking at LTC.  Pt medically stable for d/c
family working on dispo as pt not a candidate for hospice at this time
hospice eval called for overall decline, multiple comorbidities, failure to thrive  await f/u; poss home with hospice

## 2017-11-22 NOTE — PROGRESS NOTE ADULT - SUBJECTIVE AND OBJECTIVE BOX
Patient seen and examined bedside resting comfortably.  Cystostomy functioning well.    T(F): 97.1 (11-22-17 @ 11:30), Max: 98.2 (11-22-17 @ 05:20)  HR: 60 (11-22-17 @ 11:30) (60 - 76)  BP: 137/81 (11-22-17 @ 11:30) (137/81 - 153/84)  RR: 18 (11-22-17 @ 11:30) (16 - 18)  SpO2: 95% (11-22-17 @ 11:30) (95% - 98%)  CAPILLARY BLOOD GLUCOSE      POCT Blood Glucose.: 310 mg/dL (22 Nov 2017 11:11)  POCT Blood Glucose.: 214 mg/dL (22 Nov 2017 08:04)  POCT Blood Glucose.: 175 mg/dL (21 Nov 2017 22:08)      PHYSICAL EXAM:  General: NAD, WDWN  Neuro:  Alert & conscious  HEENT: NCAT, EOMI, conjunctiva clear  Lung: respirations nonlabored, good inspiratory effort  Abdomen: soft, NTND.   Extremities: no pedal edema or calf tenderness noted   : uncircumcised phallus, adequate meatus. Cystostomy functioning well.    LABS:                        9.3    10.1  )-----------( 223      ( 21 Nov 2017 07:12 )             29.2     11-21    140  |  107  |  9   ----------------------------<  217<H>  3.9   |  24  |  0.87    Ca    7.9<L>      21 Nov 2017 07:12        I&O's Detail    21 Nov 2017 07:01  -  22 Nov 2017 07:00  --------------------------------------------------------  IN:    Oral Fluid: 500 mL  Total IN: 500 mL    OUT:    Indwelling Catheter - Suprapubic: 2800 mL  Total OUT: 2800 mL    Total NET: -2300 mL      22 Nov 2017 07:01  -  22 Nov 2017 16:37  --------------------------------------------------------  IN:    Oral Fluid: 120 mL  Total IN: 120 mL    OUT:    Indwelling Catheter - Suprapubic: 1700 mL  Total OUT: 1700 mL    Total NET: -1580 mL

## 2017-11-23 LAB
ANION GAP SERPL CALC-SCNC: 6 MMOL/L — SIGNIFICANT CHANGE UP (ref 5–17)
BUN SERPL-MCNC: 12 MG/DL — SIGNIFICANT CHANGE UP (ref 7–23)
CALCIUM SERPL-MCNC: 7.9 MG/DL — LOW (ref 8.5–10.1)
CHLORIDE SERPL-SCNC: 104 MMOL/L — SIGNIFICANT CHANGE UP (ref 96–108)
CO2 SERPL-SCNC: 30 MMOL/L — SIGNIFICANT CHANGE UP (ref 22–31)
CREAT SERPL-MCNC: 1.11 MG/DL — SIGNIFICANT CHANGE UP (ref 0.5–1.3)
GLUCOSE SERPL-MCNC: 182 MG/DL — HIGH (ref 70–99)
HCT VFR BLD CALC: 26.7 % — LOW (ref 39–50)
HGB BLD-MCNC: 8.9 G/DL — LOW (ref 13–17)
MCHC RBC-ENTMCNC: 32.7 PG — SIGNIFICANT CHANGE UP (ref 27–34)
MCHC RBC-ENTMCNC: 33.2 GM/DL — SIGNIFICANT CHANGE UP (ref 32–36)
MCV RBC AUTO: 98.4 FL — SIGNIFICANT CHANGE UP (ref 80–100)
PLATELET # BLD AUTO: 224 K/UL — SIGNIFICANT CHANGE UP (ref 150–400)
POTASSIUM SERPL-MCNC: 3.8 MMOL/L — SIGNIFICANT CHANGE UP (ref 3.5–5.3)
POTASSIUM SERPL-SCNC: 3.8 MMOL/L — SIGNIFICANT CHANGE UP (ref 3.5–5.3)
PSA FLD-MCNC: 1.75 NG/ML — SIGNIFICANT CHANGE UP (ref 0–4)
RBC # BLD: 2.72 M/UL — LOW (ref 4.2–5.8)
RBC # FLD: 15.3 % — HIGH (ref 11–15)
SODIUM SERPL-SCNC: 140 MMOL/L — SIGNIFICANT CHANGE UP (ref 135–145)
WBC # BLD: 7.8 K/UL — SIGNIFICANT CHANGE UP (ref 3.8–10.5)
WBC # FLD AUTO: 7.8 K/UL — SIGNIFICANT CHANGE UP (ref 3.8–10.5)

## 2017-11-23 PROCEDURE — 99233 SBSQ HOSP IP/OBS HIGH 50: CPT

## 2017-11-23 RX ORDER — LOSARTAN POTASSIUM 100 MG/1
50 TABLET, FILM COATED ORAL DAILY
Qty: 0 | Refills: 0 | Status: DISCONTINUED | OUTPATIENT
Start: 2017-11-23 | End: 2017-11-28

## 2017-11-23 RX ADMIN — NYSTATIN CREAM 1 APPLICATION(S): 100000 CREAM TOPICAL at 17:16

## 2017-11-23 RX ADMIN — FIDAXOMICIN 200 MILLIGRAM(S): 200 GRANULE, FOR SUSPENSION ORAL at 17:15

## 2017-11-23 RX ADMIN — HEPARIN SODIUM 5000 UNIT(S): 5000 INJECTION INTRAVENOUS; SUBCUTANEOUS at 17:15

## 2017-11-23 RX ADMIN — Medication 20 MILLIGRAM(S): at 05:36

## 2017-11-23 RX ADMIN — Medication 1 TABLET(S): at 08:24

## 2017-11-23 RX ADMIN — Medication 81 MILLIGRAM(S): at 11:54

## 2017-11-23 RX ADMIN — Medication 10: at 11:55

## 2017-11-23 RX ADMIN — FIDAXOMICIN 200 MILLIGRAM(S): 200 GRANULE, FOR SUSPENSION ORAL at 05:36

## 2017-11-23 RX ADMIN — SERTRALINE 50 MILLIGRAM(S): 25 TABLET, FILM COATED ORAL at 11:55

## 2017-11-23 RX ADMIN — LOSARTAN POTASSIUM 25 MILLIGRAM(S): 100 TABLET, FILM COATED ORAL at 05:36

## 2017-11-23 RX ADMIN — HEPARIN SODIUM 5000 UNIT(S): 5000 INJECTION INTRAVENOUS; SUBCUTANEOUS at 05:36

## 2017-11-23 RX ADMIN — Medication 2: at 08:23

## 2017-11-23 RX ADMIN — FINASTERIDE 5 MILLIGRAM(S): 5 TABLET, FILM COATED ORAL at 11:54

## 2017-11-23 RX ADMIN — Medication 1 APPLICATION(S): at 11:55

## 2017-11-23 RX ADMIN — NYSTATIN CREAM 1 APPLICATION(S): 100000 CREAM TOPICAL at 05:37

## 2017-11-23 RX ADMIN — Medication 1 TABLET(S): at 17:14

## 2017-11-23 RX ADMIN — SODIUM CHLORIDE 100 MILLILITER(S): 9 INJECTION, SOLUTION INTRAVENOUS at 08:24

## 2017-11-23 RX ADMIN — CARVEDILOL PHOSPHATE 6.25 MILLIGRAM(S): 80 CAPSULE, EXTENDED RELEASE ORAL at 17:13

## 2017-11-23 RX ADMIN — CARVEDILOL PHOSPHATE 6.25 MILLIGRAM(S): 80 CAPSULE, EXTENDED RELEASE ORAL at 05:36

## 2017-11-23 RX ADMIN — Medication 6: at 17:14

## 2017-11-23 NOTE — PROGRESS NOTE ADULT - SUBJECTIVE AND OBJECTIVE BOX
Patient seen and examined bedside resting comfortably. No complaints offered. Denies diarrhea and abdominal pain.     T(F): 98 (11-23-17 @ 05:35), Max: 98 (11-22-17 @ 23:46)  HR: 61 (11-23-17 @ 05:35) (60 - 75)  BP: 162/72 (11-23-17 @ 05:35) (137/81 - 162/72)  RR: 14 (11-23-17 @ 05:35) (14 - 18)  SpO2: 98% (11-23-17 @ 05:35) (95% - 98%)  POCT Blood Glucose.: 188 mg/dL (22 Nov 2017 22:04)  POCT Blood Glucose.: 192 mg/dL (22 Nov 2017 16:42)  POCT Blood Glucose.: 310 mg/dL (22 Nov 2017 11:11)  POCT Blood Glucose.: 214 mg/dL (22 Nov 2017 08:04)    PHYSICAL EXAM:  General: NAD, WDWN, alert  HEENT: NCAT, EOMI, conjunctiva clear  CV: regular rate and rhythm  Lung: respirations nonlabored, good inspiratory effort  Abdomen: soft, NTND  : SPC in place draining clear yellow urine. 2800ml/24 hours recorded.     LABS:                        9.3    10.1  )-----------( 223      ( 21 Nov 2017 07:12 )             29.2     11-21    140  |  107  |  9   ----------------------------<  217<H>  3.9   |  24  |  0.87    Ca    7.9<L>      21 Nov 2017 07:12    I&O's Detail    21 Nov 2017 07:01  -  22 Nov 2017 07:00  --------------------------------------------------------  IN:    Oral Fluid: 500 mL  Total IN: 500 mL    OUT:    Indwelling Catheter - Suprapubic: 2800 mL  Total OUT: 2800 mL    Total NET: -2300 mL    22 Nov 2017 07:01  -  23 Nov 2017 06:30  --------------------------------------------------------  IN:    dextrose 5% + sodium chloride 0.9%.: 1200 mL    Oral Fluid: 120 mL  Total IN: 1320 mL    OUT:    Indwelling Catheter - Suprapubic: 2875 mL  Total OUT: 2875 mL    Total NET: -1555 mL    Impression: 89y Male with PMH Hypertension, High cholesterol, Depression, Enlarged prostate, Heart attack, and Diabetes mellitus. 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 Admitted with C. diff colitis. Consulted secondary to pyuria and partially blocked SPC, which was changed on 11/22    Plan:  -continue SPC, urine output monitoring and trend renal function  -continue current medical management  -will discuss with Dr Jett for further recommendations

## 2017-11-23 NOTE — PROGRESS NOTE ADULT - PROBLEM SELECTOR PLAN 6
renew home meds   f/u for SPC change  keep area clean renew home meds   f/u for SPC change  keep area clean, yanes remains in place

## 2017-11-23 NOTE — PROGRESS NOTE ADULT - PROBLEM SELECTOR PLAN 10
healthy shake supplement  encourage PO intake healthy shake supplement  encourage PO intake. mobilize, PT eval.

## 2017-11-23 NOTE — PROGRESS NOTE ADULT - SUBJECTIVE AND OBJECTIVE BOX
CC/F/U for:  c.diff colitis and dehydration    INTERVAL HPI/OVERNIGHT EVENTS:  Pt seen and examined at bedside.     Allergies/Intolerance: Cipro I.V. (Other)  indomethacin (Other)  Keflex (Unknown)  Macrobid (Unknown)  Plavix (Diarrhea; Other; Rash)  pravastatin (Unknown)  sulfamethoxazole (Unknown)      MEDICATIONS  (STANDING):  aspirin enteric coated 81 milliGRAM(s) Oral daily  atorvastatin 10 milliGRAM(s) Oral at bedtime  BACItracin   Ointment 1 Application(s) Topical daily  carvedilol 6.25 milliGRAM(s) Oral every 12 hours  dextrose 5% + sodium chloride 0.9%. 1000 milliLiter(s) (100 mL/Hr) IV Continuous <Continuous>  dextrose 5%. 1000 milliLiter(s) (50 mL/Hr) IV Continuous <Continuous>  dextrose 50% Injectable 12.5 Gram(s) IV Push once  dextrose 50% Injectable 25 Gram(s) IV Push once  dextrose 50% Injectable 25 Gram(s) IV Push once  fidaxomicin 200 milliGRAM(s) Oral two times a day  finasteride 5 milliGRAM(s) Oral daily  furosemide    Tablet 20 milliGRAM(s) Oral daily  heparin  Injectable 5000 Unit(s) SubCutaneous every 12 hours  insulin lispro (HumaLOG) corrective regimen sliding scale   SubCutaneous three times a day before meals  insulin lispro (HumaLOG) corrective regimen sliding scale   SubCutaneous at bedtime  lactobacillus acidophilus 1 Tablet(s) Oral two times a day with meals  losartan 25 milliGRAM(s) Oral daily  nystatin Cream 1 Application(s) Topical two times a day  sertraline 50 milliGRAM(s) Oral daily    MEDICATIONS  (PRN):  acetaminophen   Tablet 650 milliGRAM(s) Oral every 6 hours PRN For Temp greater than 38 C (100.4 F)  dextrose Gel 1 Dose(s) Oral once PRN Blood Glucose LESS THAN 70 milliGRAM(s)/deciliter  glucagon  Injectable 1 milliGRAM(s) IntraMuscular once PRN Glucose LESS THAN 70 milligrams/deciliter        ROS: all systems reviewed and wnl      PHYSICAL EXAMINATION:  Vital Signs Last 24 Hrs  T(C): 36.7 (23 Nov 2017 05:35), Max: 36.7 (22 Nov 2017 23:46)  T(F): 98 (23 Nov 2017 05:35), Max: 98 (22 Nov 2017 23:46)  HR: 61 (23 Nov 2017 05:35) (60 - 75)  BP: 162/72 (23 Nov 2017 05:35) (137/81 - 162/72)  BP(mean): --  RR: 14 (23 Nov 2017 05:35) (14 - 18)  SpO2: 98% (23 Nov 2017 05:35) (95% - 98%)  CAPILLARY BLOOD GLUCOSE      POCT Blood Glucose.: 177 mg/dL (23 Nov 2017 07:38)  POCT Blood Glucose.: 188 mg/dL (22 Nov 2017 22:04)  POCT Blood Glucose.: 192 mg/dL (22 Nov 2017 16:42)  POCT Blood Glucose.: 310 mg/dL (22 Nov 2017 11:11)      11-22 @ 07:01  -  11-23 @ 07:00  --------------------------------------------------------  IN: 1320 mL / OUT: 2875 mL / NET: -1555 mL    11-23 @ 07:01  -  11-23 @ 09:21  --------------------------------------------------------  IN: 130 mL / OUT: 1600 mL / NET: -1470 mL        GENERAL: stable in bed, comfortable, NAD  NECK: supple, No JVD  CHEST/LUNG: clear to auscultation bilaterally; no rales, rhonchi, or wheezing b/l  HEART: normal S1, S2  ABDOMEN: BS+, soft, ND, NT   EXTREMITIES:  pulses palpable; no clubbing, cyanosis, or edema b/l LEs  NEURO: awake, alert, interactive; moves all extremities  SKIN: no rashes or lesions      LABS:                        8.9    7.8   )-----------( 224      ( 23 Nov 2017 06:20 )             26.7     11-23    140  |  104  |  12  ----------------------------<  182<H>  3.8   |  30  |  1.11    Ca    7.9<L>      23 Nov 2017 06:20 CC/F/U for:  c.diff colitis and dehydration    INTERVAL HPI/OVERNIGHT EVENTS:  Pt seen and examined at bedside.     Allergies/Intolerance: Cipro I.V. (Other)  indomethacin (Other)  Keflex (Unknown)  Macrobid (Unknown)  Plavix (Diarrhea; Other; Rash)  pravastatin (Unknown)  sulfamethoxazole (Unknown)      MEDICATIONS  (STANDING):  aspirin enteric coated 81 milliGRAM(s) Oral daily  atorvastatin 10 milliGRAM(s) Oral at bedtime  BACItracin   Ointment 1 Application(s) Topical daily  carvedilol 6.25 milliGRAM(s) Oral every 12 hours  dextrose 5% + sodium chloride 0.9%. 1000 milliLiter(s) (100 mL/Hr) IV Continuous <Continuous>  dextrose 5%. 1000 milliLiter(s) (50 mL/Hr) IV Continuous <Continuous>  dextrose 50% Injectable 12.5 Gram(s) IV Push once  dextrose 50% Injectable 25 Gram(s) IV Push once  dextrose 50% Injectable 25 Gram(s) IV Push once  fidaxomicin 200 milliGRAM(s) Oral two times a day  finasteride 5 milliGRAM(s) Oral daily  furosemide    Tablet 20 milliGRAM(s) Oral daily  heparin  Injectable 5000 Unit(s) SubCutaneous every 12 hours  insulin lispro (HumaLOG) corrective regimen sliding scale   SubCutaneous three times a day before meals  insulin lispro (HumaLOG) corrective regimen sliding scale   SubCutaneous at bedtime  lactobacillus acidophilus 1 Tablet(s) Oral two times a day with meals  losartan 25 milliGRAM(s) Oral daily  nystatin Cream 1 Application(s) Topical two times a day  sertraline 50 milliGRAM(s) Oral daily    MEDICATIONS  (PRN):  acetaminophen   Tablet 650 milliGRAM(s) Oral every 6 hours PRN For Temp greater than 38 C (100.4 F)  dextrose Gel 1 Dose(s) Oral once PRN Blood Glucose LESS THAN 70 milliGRAM(s)/deciliter  glucagon  Injectable 1 milliGRAM(s) IntraMuscular once PRN Glucose LESS THAN 70 milligrams/deciliter        ROS: all systems reviewed and wnl      PHYSICAL EXAMINATION:  Vital Signs Last 24 Hrs  T(C): 36.7 (23 Nov 2017 05:35), Max: 36.7 (22 Nov 2017 23:46)  T(F): 98 (23 Nov 2017 05:35), Max: 98 (22 Nov 2017 23:46)  HR: 61 (23 Nov 2017 05:35) (60 - 75)  BP: 162/72 (23 Nov 2017 05:35) (137/81 - 162/72)  BP(mean): --  RR: 14 (23 Nov 2017 05:35) (14 - 18)  SpO2: 98% (23 Nov 2017 05:35) (95% - 98%)  CAPILLARY BLOOD GLUCOSE      POCT Blood Glucose.: 177 mg/dL (23 Nov 2017 07:38)  POCT Blood Glucose.: 188 mg/dL (22 Nov 2017 22:04)  POCT Blood Glucose.: 192 mg/dL (22 Nov 2017 16:42)  POCT Blood Glucose.: 310 mg/dL (22 Nov 2017 11:11)      11-22 @ 07:01  -  11-23 @ 07:00  --------------------------------------------------------  IN: 1320 mL / OUT: 2875 mL / NET: -1555 mL    11-23 @ 07:01  -  11-23 @ 09:21  --------------------------------------------------------  IN: 130 mL / OUT: 1600 mL / NET: -1470 mL        GENERAL: stable in bed, comfortable, NAD, diarrhea improving.   NECK: supple, No JVD  CHEST/LUNG: clear to auscultation bilaterally; no rales, rhonchi, or wheezing b/l  HEART: normal S1, S2  ABDOMEN: BS+, soft, ND, NT   EXTREMITIES:  pulses palpable; no clubbing, cyanosis, or edema b/l LEs  NEURO: awake, alert, interactive; moves all extremities  SKIN: no rashes or lesions      LABS:                        8.9    7.8   )-----------( 224      ( 23 Nov 2017 06:20 )             26.7     11-23    140  |  104  |  12  ----------------------------<  182<H>  3.8   |  30  |  1.11    Ca    7.9<L>      23 Nov 2017 06:20 CC/F/U for:  c.diff colitis and dehydration    INTERVAL HPI/OVERNIGHT EVENTS:  Pt seen and examined at bedside.     Allergies/Intolerance: Cipro I.V. (Other)  indomethacin (Other)  Keflex (Unknown)  Macrobid (Unknown)  Plavix (Diarrhea; Other; Rash)  pravastatin (Unknown)  sulfamethoxazole (Unknown)      MEDICATIONS  (STANDING):  aspirin enteric coated 81 milliGRAM(s) Oral daily  atorvastatin 10 milliGRAM(s) Oral at bedtime  BACItracin   Ointment 1 Application(s) Topical daily  carvedilol 6.25 milliGRAM(s) Oral every 12 hours  dextrose 5% + sodium chloride 0.9%. 1000 milliLiter(s) (100 mL/Hr) IV Continuous <Continuous>  dextrose 5%. 1000 milliLiter(s) (50 mL/Hr) IV Continuous <Continuous>  dextrose 50% Injectable 12.5 Gram(s) IV Push once  dextrose 50% Injectable 25 Gram(s) IV Push once  dextrose 50% Injectable 25 Gram(s) IV Push once  fidaxomicin 200 milliGRAM(s) Oral two times a day  finasteride 5 milliGRAM(s) Oral daily  furosemide    Tablet 20 milliGRAM(s) Oral daily  heparin  Injectable 5000 Unit(s) SubCutaneous every 12 hours  insulin lispro (HumaLOG) corrective regimen sliding scale   SubCutaneous three times a day before meals  insulin lispro (HumaLOG) corrective regimen sliding scale   SubCutaneous at bedtime  lactobacillus acidophilus 1 Tablet(s) Oral two times a day with meals  losartan 25 milliGRAM(s) Oral daily  nystatin Cream 1 Application(s) Topical two times a day  sertraline 50 milliGRAM(s) Oral daily    MEDICATIONS  (PRN):  acetaminophen   Tablet 650 milliGRAM(s) Oral every 6 hours PRN For Temp greater than 38 C (100.4 F)  dextrose Gel 1 Dose(s) Oral once PRN Blood Glucose LESS THAN 70 milliGRAM(s)/deciliter  glucagon  Injectable 1 milliGRAM(s) IntraMuscular once PRN Glucose LESS THAN 70 milligrams/deciliter        ROS: all systems reviewed and wnl      PHYSICAL EXAMINATION:  Vital Signs Last 24 Hrs  T(C): 36.7 (23 Nov 2017 05:35), Max: 36.7 (22 Nov 2017 23:46)  T(F): 98 (23 Nov 2017 05:35), Max: 98 (22 Nov 2017 23:46)  HR: 61 (23 Nov 2017 05:35) (60 - 75)  BP: 162/72 (23 Nov 2017 05:35) (137/81 - 162/72)  BP(mean): --  RR: 14 (23 Nov 2017 05:35) (14 - 18)  SpO2: 98% (23 Nov 2017 05:35) (95% - 98%)  CAPILLARY BLOOD GLUCOSE      POCT Blood Glucose.: 177 mg/dL (23 Nov 2017 07:38)  POCT Blood Glucose.: 188 mg/dL (22 Nov 2017 22:04)  POCT Blood Glucose.: 192 mg/dL (22 Nov 2017 16:42)  POCT Blood Glucose.: 310 mg/dL (22 Nov 2017 11:11)      11-22 @ 07:01  -  11-23 @ 07:00  --------------------------------------------------------  IN: 1320 mL / OUT: 2875 mL / NET: -1555 mL    11-23 @ 07:01 - 11-23 @ 09:21  --------------------------------------------------------  IN: 130 mL / OUT: 1600 mL / NET: -1470 mL        GENERAL: stable in bed, comfortable, NAD, diarrhea improving, frail, no abdominal pains   NECK: supple, No JVD  CHEST/LUNG: clear to auscultation bilaterally; no rales, rhonchi, or wheezing b/l  HEART: normal S1, S2  ABDOMEN: BS+, soft, ND, NT   EXTREMITIES:  pulses palpable; no clubbing, cyanosis, or edema b/l LEs  NEURO: awake, alert, interactive; moves all extremities  SKIN: no rashes or lesions      LABS:                        8.9    7.8   )-----------( 224      ( 23 Nov 2017 06:20 )             26.7     11-23    140  |  104  |  12  ----------------------------<  182<H>  3.8   |  30  |  1.11    Ca    7.9<L>      23 Nov 2017 06:20

## 2017-11-23 NOTE — PROGRESS NOTE ADULT - PROBLEM SELECTOR PLAN 9
lasix 20 mg   acei   resumed coreg at 6.25 bid and not 25 bid for now and monitor bp. can uptitrate as needed lasix 20 mg. stop IVF   acei   resumed coreg at 6.25 bid and not 25 bid for now and monitor bp. can uptitrate as needed lasix 20 mg. stop IVF   acei, increase Cozaar to 50 mg/day   resumed coreg at 6.25 bid and not 25 bid for now and monitor bp. can uptitrate as needed

## 2017-11-23 NOTE — PROGRESS NOTE ADULT - PROBLEM SELECTOR PLAN 1
improving, stool formed today  secondary to cdiff. diarrhea resolved . currently afebrile    to complete dificid 11/28  per GI 11/14/17 started Dificid improving, stool formed today, on regular diet  secondary to cdiff. diarrhea resolved . currently afebrile    to complete dificid 11/28  per GI 11/14/17 started Dificid, continue acidophilus

## 2017-11-23 NOTE — PROGRESS NOTE ADULT - SUBJECTIVE AND OBJECTIVE BOX
Pt stable - hgb slightly decreased  on Dificid for +C diff colitis      MEDICATIONS  (STANDING):  aspirin enteric coated 81 milliGRAM(s) Oral daily  atorvastatin 10 milliGRAM(s) Oral at bedtime  BACItracin   Ointment 1 Application(s) Topical daily  carvedilol 6.25 milliGRAM(s) Oral every 12 hours  dextrose 5%. 1000 milliLiter(s) (50 mL/Hr) IV Continuous <Continuous>  dextrose 50% Injectable 12.5 Gram(s) IV Push once  dextrose 50% Injectable 25 Gram(s) IV Push once  dextrose 50% Injectable 25 Gram(s) IV Push once  fidaxomicin 200 milliGRAM(s) Oral two times a day  finasteride 5 milliGRAM(s) Oral daily  furosemide    Tablet 20 milliGRAM(s) Oral daily  heparin  Injectable 5000 Unit(s) SubCutaneous every 12 hours  insulin lispro (HumaLOG) corrective regimen sliding scale   SubCutaneous three times a day before meals  insulin lispro (HumaLOG) corrective regimen sliding scale   SubCutaneous at bedtime  lactobacillus acidophilus 1 Tablet(s) Oral two times a day with meals  losartan 50 milliGRAM(s) Oral daily  nystatin Cream 1 Application(s) Topical two times a day  sertraline 50 milliGRAM(s) Oral daily    MEDICATIONS  (PRN):  acetaminophen   Tablet 650 milliGRAM(s) Oral every 6 hours PRN For Temp greater than 38 C (100.4 F)  dextrose Gel 1 Dose(s) Oral once PRN Blood Glucose LESS THAN 70 milliGRAM(s)/deciliter  glucagon  Injectable 1 milliGRAM(s) IntraMuscular once PRN Glucose LESS THAN 70 milligrams/deciliter      Allergies    Cipro I.V. (Other)  indomethacin (Other)  Keflex (Unknown)  Macrobid (Unknown)  Plavix (Diarrhea; Other; Rash)  pravastatin (Unknown)  sulfamethoxazole (Unknown)    Intolerances        Vital Signs Last 24 Hrs  T(C): 35.8 (23 Nov 2017 17:30), Max: 36.7 (22 Nov 2017 23:46)  T(F): 96.5 (23 Nov 2017 17:30), Max: 98 (22 Nov 2017 23:46)  HR: 69 (23 Nov 2017 17:30) (61 - 69)  BP: 171/73 (23 Nov 2017 17:30) (132/53 - 171/73)  BP(mean): --  RR: 18 (23 Nov 2017 17:30) (14 - 18)  SpO2: 98% (23 Nov 2017 17:30) (96% - 98%)    PHYSICAL EXAM:  General: NAD.  CVS: S1, S2  Chest: air entry bilaterally present  Abd: BS present, soft, non-tender      LABS:                        8.9    7.8   )-----------( 224      ( 23 Nov 2017 06:20 )             26.7     11-23    140  |  104  |  12  ----------------------------<  182<H>  3.8   |  30  |  1.11    Ca    7.9<L>      23 Nov 2017 06:20      diet as tolerated   CBC in AM  continue dificid

## 2017-11-23 NOTE — PROGRESS NOTE ADULT - PROBLEM SELECTOR PLAN 8
pt hgb low but stable    occult blood negative times two   will continue to monitor . pt hgb low but stable, HGB 8.9, acceptable, possibly from dilution effects    occult blood negative times two. Will stop IVF   will continue to monitor.  CBC in AM

## 2017-11-24 LAB
HCT VFR BLD CALC: 28.5 % — LOW (ref 39–50)
HGB BLD-MCNC: 9.5 G/DL — LOW (ref 13–17)
MCHC RBC-ENTMCNC: 33.3 GM/DL — SIGNIFICANT CHANGE UP (ref 32–36)
MCHC RBC-ENTMCNC: 33.5 PG — SIGNIFICANT CHANGE UP (ref 27–34)
MCV RBC AUTO: 100.5 FL — HIGH (ref 80–100)
PLATELET # BLD AUTO: 221 K/UL — SIGNIFICANT CHANGE UP (ref 150–400)
RBC # BLD: 2.84 M/UL — LOW (ref 4.2–5.8)
RBC # FLD: 15 % — SIGNIFICANT CHANGE UP (ref 11–15)
WBC # BLD: 9.2 K/UL — SIGNIFICANT CHANGE UP (ref 3.8–10.5)
WBC # FLD AUTO: 9.2 K/UL — SIGNIFICANT CHANGE UP (ref 3.8–10.5)

## 2017-11-24 RX ADMIN — NYSTATIN CREAM 1 APPLICATION(S): 100000 CREAM TOPICAL at 05:07

## 2017-11-24 RX ADMIN — ATORVASTATIN CALCIUM 10 MILLIGRAM(S): 80 TABLET, FILM COATED ORAL at 23:13

## 2017-11-24 RX ADMIN — CARVEDILOL PHOSPHATE 6.25 MILLIGRAM(S): 80 CAPSULE, EXTENDED RELEASE ORAL at 17:31

## 2017-11-24 RX ADMIN — LOSARTAN POTASSIUM 50 MILLIGRAM(S): 100 TABLET, FILM COATED ORAL at 05:07

## 2017-11-24 RX ADMIN — Medication 20 MILLIGRAM(S): at 05:07

## 2017-11-24 RX ADMIN — Medication 6: at 11:33

## 2017-11-24 RX ADMIN — FINASTERIDE 5 MILLIGRAM(S): 5 TABLET, FILM COATED ORAL at 11:32

## 2017-11-24 RX ADMIN — SERTRALINE 50 MILLIGRAM(S): 25 TABLET, FILM COATED ORAL at 11:32

## 2017-11-24 RX ADMIN — Medication 1 TABLET(S): at 17:31

## 2017-11-24 RX ADMIN — CARVEDILOL PHOSPHATE 6.25 MILLIGRAM(S): 80 CAPSULE, EXTENDED RELEASE ORAL at 05:07

## 2017-11-24 RX ADMIN — Medication 81 MILLIGRAM(S): at 11:32

## 2017-11-24 RX ADMIN — Medication 1 TABLET(S): at 07:44

## 2017-11-24 RX ADMIN — Medication 4: at 16:19

## 2017-11-24 RX ADMIN — HEPARIN SODIUM 5000 UNIT(S): 5000 INJECTION INTRAVENOUS; SUBCUTANEOUS at 05:07

## 2017-11-24 RX ADMIN — NYSTATIN CREAM 1 APPLICATION(S): 100000 CREAM TOPICAL at 17:31

## 2017-11-24 RX ADMIN — Medication 1 APPLICATION(S): at 11:35

## 2017-11-24 RX ADMIN — FIDAXOMICIN 200 MILLIGRAM(S): 200 GRANULE, FOR SUSPENSION ORAL at 05:07

## 2017-11-24 RX ADMIN — HEPARIN SODIUM 5000 UNIT(S): 5000 INJECTION INTRAVENOUS; SUBCUTANEOUS at 17:31

## 2017-11-24 RX ADMIN — Medication 2: at 07:44

## 2017-11-24 RX ADMIN — FIDAXOMICIN 200 MILLIGRAM(S): 200 GRANULE, FOR SUSPENSION ORAL at 17:31

## 2017-11-24 NOTE — PROGRESS NOTE ADULT - SUBJECTIVE AND OBJECTIVE BOX
Patient seen and examined bedside resting comfortably, eating breakfast.  No complaints offered.     T(F): 97.4 (11-24-17 @ 11:07), Max: 98 (11-23-17 @ 23:05)  HR: 60 (11-24-17 @ 11:07) (60 - 69)  BP: 103/43 (11-24-17 @ 11:07) (103/43 - 171/73)  RR: 17 (11-24-17 @ 11:07) (15 - 18)  SpO2: 100% (11-24-17 @ 11:07) (98% - 100%)  POCT Blood Glucose.: 259 mg/dL (24 Nov 2017 11:09)  POCT Blood Glucose.: 177 mg/dL (24 Nov 2017 07:41)  POCT Blood Glucose.: 180 mg/dL (23 Nov 2017 21:46)  POCT Blood Glucose.: 266 mg/dL (23 Nov 2017 16:52)    PHYSICAL EXAM:  General: NAD, WDWN, alert  CV: regular rate and rhythm  Lung: respirations nonlabored, good inspiratory effort  : SPC in place draining clear yellow urine: 1400ml/24 hours. No purulence    LABS:                        9.5    9.2   )-----------( 221      ( 24 Nov 2017 06:40 )             28.5     11-23    140  |  104  |  12  ----------------------------<  182<H>  3.8   |  30  |  1.11    Ca    7.9<L>      23 Nov 2017 06:20    I&O's Detail    23 Nov 2017 07:01  -  24 Nov 2017 07:00  --------------------------------------------------------  IN:    Oral Fluid: 310 mL  Total IN: 310 mL    OUT:    Indwelling Catheter - Suprapubic: 2800 mL  Total OUT: 2800 mL    Total NET: -2490 mL    24 Nov 2017 07:01  -  24 Nov 2017 11:25  --------------------------------------------------------  IN:    Oral Fluid: 300 mL  Total IN: 300 mL    OUT:    Indwelling Catheter - Suprapubic: 1400 mL  Total OUT: 1400 mL    Total NET: -1100 mL    Impression: 89y Male with PMH Hypertension, High cholesterol, Depression, Enlarged prostate, Heart attack, and Diabetes mellitus. 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 Admitted with C. diff colitis. Consulted secondary to pyuria and partially blocked SPC, which was changed on 11/22    Plan:  -continue SPC, urine output monitoring and trend renal function  -continue abx  -continue current medical management  -discussed with Dr Jett

## 2017-11-24 NOTE — PROGRESS NOTE ADULT - SUBJECTIVE AND OBJECTIVE BOX
Pt stable formed stool  on dificid      MEDICATIONS  (STANDING):  aspirin enteric coated 81 milliGRAM(s) Oral daily  atorvastatin 10 milliGRAM(s) Oral at bedtime  BACItracin   Ointment 1 Application(s) Topical daily  carvedilol 6.25 milliGRAM(s) Oral every 12 hours  dextrose 5%. 1000 milliLiter(s) (50 mL/Hr) IV Continuous <Continuous>  dextrose 50% Injectable 12.5 Gram(s) IV Push once  dextrose 50% Injectable 25 Gram(s) IV Push once  dextrose 50% Injectable 25 Gram(s) IV Push once  fidaxomicin 200 milliGRAM(s) Oral two times a day  finasteride 5 milliGRAM(s) Oral daily  furosemide    Tablet 20 milliGRAM(s) Oral daily  heparin  Injectable 5000 Unit(s) SubCutaneous every 12 hours  insulin lispro (HumaLOG) corrective regimen sliding scale   SubCutaneous three times a day before meals  insulin lispro (HumaLOG) corrective regimen sliding scale   SubCutaneous at bedtime  lactobacillus acidophilus 1 Tablet(s) Oral two times a day with meals  losartan 50 milliGRAM(s) Oral daily  nystatin Cream 1 Application(s) Topical two times a day  sertraline 50 milliGRAM(s) Oral daily    MEDICATIONS  (PRN):  acetaminophen   Tablet 650 milliGRAM(s) Oral every 6 hours PRN For Temp greater than 38 C (100.4 F)  dextrose Gel 1 Dose(s) Oral once PRN Blood Glucose LESS THAN 70 milliGRAM(s)/deciliter  glucagon  Injectable 1 milliGRAM(s) IntraMuscular once PRN Glucose LESS THAN 70 milligrams/deciliter      Allergies    Cipro I.V. (Other)  indomethacin (Other)  Keflex (Unknown)  Macrobid (Unknown)  Plavix (Diarrhea; Other; Rash)  pravastatin (Unknown)  sulfamethoxazole (Unknown)    Intolerances        Vital Signs Last 24 Hrs  T(C): 36.4 (24 Nov 2017 17:56), Max: 36.7 (23 Nov 2017 23:05)  T(F): 97.6 (24 Nov 2017 17:56), Max: 98 (23 Nov 2017 23:05)  HR: 71 (24 Nov 2017 17:56) (60 - 71)  BP: 153/70 (24 Nov 2017 17:56) (103/43 - 153/70)  BP(mean): --  RR: 18 (24 Nov 2017 17:56) (15 - 18)  SpO2: 99% (24 Nov 2017 17:56) (99% - 100%)    PHYSICAL EXAM:  General: NAD.  CVS: S1, S2  Chest: air entry bilaterally present  Abd: BS present, soft, non-tender      LABS:                        9.5    9.2   )-----------( 221      ( 24 Nov 2017 06:40 )             28.5     11-23    140  |  104  |  12  ----------------------------<  182<H>  3.8   |  30  |  1.11    Ca    7.9<L>      23 Nov 2017 06:20      continue dificid  awaiting placement

## 2017-11-24 NOTE — CHART NOTE - NSCHARTNOTEFT_GEN_A_CORE
Assessment: 89 y.o. M on antibiotics for +c diff colitis. +BM x2 11/22. pt DNR.    Factors impacting intake: [ ] none [ ] nausea  [ ] vomiting [ ] diarrhea [ ] constipation  [ ]chewing problems [x ] swallowing issues (hx dysphagia) [x ] other: mental status, confused    Diet Presciption: Diet, Soft:   DASH/TLC {Sodium & Cholesterol Restricted}  Dysphagia 2, Mechancial Soft, Nectar Consistency Fluid (DYS2NC)  Supplement Feeding Modality:  Oral  Health Shake Cans or Servings Per Day:  3       Frequency:  Three Times a day (11-20-17 @ 14:46)    Intake: 50-75% meals/supplements. total feed.     Current Weight: no new wt since last assessment 11/20  % Weight Change    Pertinent Medications: MEDICATIONS  (STANDING):  aspirin enteric coated 81 milliGRAM(s) Oral daily  atorvastatin 10 milliGRAM(s) Oral at bedtime  BACItracin   Ointment 1 Application(s) Topical daily  carvedilol 6.25 milliGRAM(s) Oral every 12 hours  dextrose 5%. 1000 milliLiter(s) (50 mL/Hr) IV Continuous <Continuous>  dextrose 50% Injectable 12.5 Gram(s) IV Push once  dextrose 50% Injectable 25 Gram(s) IV Push once  dextrose 50% Injectable 25 Gram(s) IV Push once  fidaxomicin 200 milliGRAM(s) Oral two times a day  finasteride 5 milliGRAM(s) Oral daily  furosemide    Tablet 20 milliGRAM(s) Oral daily  heparin  Injectable 5000 Unit(s) SubCutaneous every 12 hours  insulin lispro (HumaLOG) corrective regimen sliding scale   SubCutaneous three times a day before meals  insulin lispro (HumaLOG) corrective regimen sliding scale   SubCutaneous at bedtime  lactobacillus acidophilus 1 Tablet(s) Oral two times a day with meals  losartan 50 milliGRAM(s) Oral daily  nystatin Cream 1 Application(s) Topical two times a day  sertraline 50 milliGRAM(s) Oral daily    MEDICATIONS  (PRN):  acetaminophen   Tablet 650 milliGRAM(s) Oral every 6 hours PRN For Temp greater than 38 C (100.4 F)  dextrose Gel 1 Dose(s) Oral once PRN Blood Glucose LESS THAN 70 milliGRAM(s)/deciliter  glucagon  Injectable 1 milliGRAM(s) IntraMuscular once PRN Glucose LESS THAN 70 milligrams/deciliter    Pertinent Labs:    CAPILLARY BLOOD GLUCOSE      POCT Blood Glucose.: 180 mg/dL (23 Nov 2017 21:46)  POCT Blood Glucose.: 266 mg/dL (23 Nov 2017 16:52)  POCT Blood Glucose.: 367 mg/dL (23 Nov 2017 11:16)  POCT Blood Glucose.: 177 mg/dL (23 Nov 2017 07:38)    Skin: WDL. nonpitting edema L arm    Estimated Needs:   [x ] no change since previous assessment  [ ] recalculated:     Previous Nutrition Diagnosis:   [ ] Inadequate Energy Intake [ ]Inadequate Oral Intake [ ] Excessive Energy Intake   [ ] Underweight [ ] Increased Nutrient Needs [ ] Overweight/Obesity   [ ] Altered GI Function [ ] Unintended Weight Loss [ ] Food & Nutrition Related Knowledge Deficit [x ] Malnutrition - severe    Nutrition Diagnosis is [x ] ongoing  [ ] resolved [ ] not applicable     New Nutrition Diagnosis: [x ] not applicable       Interventions:   Recommend  [ ] Change Diet To:  [ ] Nutrition Supplement  [ ] Nutrition Support  [x ] Other: continue current nutrition plan of care     Monitoring and Evaluation:   [ ] PO intake [ x ] Tolerance to diet prescription [ x ] weights [ x ] labs[ x ] follow up per protocol  [ ] other:

## 2017-11-24 NOTE — PROGRESS NOTE ADULT - SUBJECTIVE AND OBJECTIVE BOX
Patient is a 89y old  Male who presents with c diff colitis        SUBJECTIVE / OVERNIGHT EVENTS: stool formed; no complaints; SW working with family for placement      MEDICATIONS  (STANDING):  aspirin enteric coated 81 milliGRAM(s) Oral daily  atorvastatin 10 milliGRAM(s) Oral at bedtime  BACItracin   Ointment 1 Application(s) Topical daily  carvedilol 6.25 milliGRAM(s) Oral every 12 hours  dextrose 5%. 1000 milliLiter(s) (50 mL/Hr) IV Continuous <Continuous>  dextrose 50% Injectable 12.5 Gram(s) IV Push once  dextrose 50% Injectable 25 Gram(s) IV Push once  dextrose 50% Injectable 25 Gram(s) IV Push once  fidaxomicin 200 milliGRAM(s) Oral two times a day  finasteride 5 milliGRAM(s) Oral daily  furosemide    Tablet 20 milliGRAM(s) Oral daily  heparin  Injectable 5000 Unit(s) SubCutaneous every 12 hours  insulin lispro (HumaLOG) corrective regimen sliding scale   SubCutaneous three times a day before meals  insulin lispro (HumaLOG) corrective regimen sliding scale   SubCutaneous at bedtime  lactobacillus acidophilus 1 Tablet(s) Oral two times a day with meals  losartan 50 milliGRAM(s) Oral daily  nystatin Cream 1 Application(s) Topical two times a day  sertraline 50 milliGRAM(s) Oral daily    MEDICATIONS  (PRN):  acetaminophen   Tablet 650 milliGRAM(s) Oral every 6 hours PRN For Temp greater than 38 C (100.4 F)  dextrose Gel 1 Dose(s) Oral once PRN Blood Glucose LESS THAN 70 milliGRAM(s)/deciliter  glucagon  Injectable 1 milliGRAM(s) IntraMuscular once PRN Glucose LESS THAN 70 milligrams/deciliter      Vital Signs Last 24 Hrs  T(F): 98 (24 Nov 2017 05:05), Max: 98 (23 Nov 2017 23:05)  HR: 67 (24 Nov 2017 05:05) (63 - 69)  BP: 146/69 (24 Nov 2017 05:05) (132/53 - 171/73)  RR: 15 (24 Nov 2017 05:05) (15 - 18)  SpO2: 100% (24 Nov 2017 05:05) (96% - 100%)    CAPILLARY BLOOD GLUCOSE  POCT Blood Glucose.: 177 mg/dL (24 Nov 2017 07:41)  POCT Blood Glucose.: 180 mg/dL (23 Nov 2017 21:46)  POCT Blood Glucose.: 266 mg/dL (23 Nov 2017 16:52)  POCT Blood Glucose.: 367 mg/dL (23 Nov 2017 11:16)      PHYSICAL EXAM  GENERAL: NAD, well-developed  HEAD:  Atraumatic, Normocephalic  EYES: EOMI, PERRLA, conjunctiva and sclera clear  CHEST/LUNG: CTA ant/lat  HEART: Regular rate and rhythm; No murmurs, rubs, or gallops  ABDOMEN: Soft, Nontender, Nondistended; Bowel sounds present  EXTREMITIES:  No clubbing, cyanosis, or edema  PSYCH: AAOx3    LABS:                        9.5    9.2   )-----------( 221      ( 24 Nov 2017 06:40 )             28.5

## 2017-11-25 PROCEDURE — 99233 SBSQ HOSP IP/OBS HIGH 50: CPT

## 2017-11-25 RX ADMIN — HEPARIN SODIUM 5000 UNIT(S): 5000 INJECTION INTRAVENOUS; SUBCUTANEOUS at 05:56

## 2017-11-25 RX ADMIN — LOSARTAN POTASSIUM 50 MILLIGRAM(S): 100 TABLET, FILM COATED ORAL at 05:56

## 2017-11-25 RX ADMIN — FIDAXOMICIN 200 MILLIGRAM(S): 200 GRANULE, FOR SUSPENSION ORAL at 19:00

## 2017-11-25 RX ADMIN — Medication 1 TABLET(S): at 18:40

## 2017-11-25 RX ADMIN — SERTRALINE 50 MILLIGRAM(S): 25 TABLET, FILM COATED ORAL at 11:41

## 2017-11-25 RX ADMIN — Medication 2: at 07:51

## 2017-11-25 RX ADMIN — NYSTATIN CREAM 1 APPLICATION(S): 100000 CREAM TOPICAL at 18:41

## 2017-11-25 RX ADMIN — CARVEDILOL PHOSPHATE 6.25 MILLIGRAM(S): 80 CAPSULE, EXTENDED RELEASE ORAL at 05:56

## 2017-11-25 RX ADMIN — Medication 4: at 11:40

## 2017-11-25 RX ADMIN — HEPARIN SODIUM 5000 UNIT(S): 5000 INJECTION INTRAVENOUS; SUBCUTANEOUS at 18:40

## 2017-11-25 RX ADMIN — FIDAXOMICIN 200 MILLIGRAM(S): 200 GRANULE, FOR SUSPENSION ORAL at 05:55

## 2017-11-25 RX ADMIN — ATORVASTATIN CALCIUM 10 MILLIGRAM(S): 80 TABLET, FILM COATED ORAL at 21:48

## 2017-11-25 RX ADMIN — Medication 1 APPLICATION(S): at 11:41

## 2017-11-25 RX ADMIN — FINASTERIDE 5 MILLIGRAM(S): 5 TABLET, FILM COATED ORAL at 11:41

## 2017-11-25 RX ADMIN — Medication 2: at 16:21

## 2017-11-25 RX ADMIN — Medication 81 MILLIGRAM(S): at 11:40

## 2017-11-25 RX ADMIN — NYSTATIN CREAM 1 APPLICATION(S): 100000 CREAM TOPICAL at 06:01

## 2017-11-25 RX ADMIN — Medication 1 TABLET(S): at 07:52

## 2017-11-25 RX ADMIN — Medication 20 MILLIGRAM(S): at 05:56

## 2017-11-25 NOTE — PROGRESS NOTE ADULT - SUBJECTIVE AND OBJECTIVE BOX
Patient seen and examined bedside resting comfortably.  No complaints offered. No acute overnight event.   Denies fevers, abdominal pain, Nausea or vomiting. Denies diarrhea. Has been tolerating diet.    T(F): 98.1 (11-24-17 @ 23:26), Max: 98.1 (11-24-17 @ 23:26)  HR: 71 (11-24-17 @ 23:26) (60 - 71)  BP: 138/73 (11-24-17 @ 23:26) (103/43 - 153/70)  RR: 18 (11-24-17 @ 23:26) (15 - 18)  SpO2: 98% (11-24-17 @ 23:26) (98% - 100%)    POCT Blood Glucose.: 185 mg/dL (24 Nov 2017 23:12)  POCT Blood Glucose.: 215 mg/dL (24 Nov 2017 15:39)  POCT Blood Glucose.: 259 mg/dL (24 Nov 2017 11:09)  POCT Blood Glucose.: 177 mg/dL (24 Nov 2017 07:41)    PHYSICAL EXAM:  General: NAD, alert and awake  Chest: nonlabored respirations  Abdomen: soft, NT/ND.   Extremities: no pedal edema or calf tenderness noted   : SPC in place draining clear yellow urine: 2250/24 hours. No purulence    LABS:                        9.5    9.2   )-----------( 221      ( 24 Nov 2017 06:40 )             28.5   11-23    140  |  104  |  12  ----------------------------<  182<H>  3.8   |  30  |  1.11    Ca    7.9<L>      23 Nov 2017 06:20      I&O's Detail    23 Nov 2017 07:01  -  24 Nov 2017 07:00  --------------------------------------------------------  IN:    Oral Fluid: 310 mL  Total IN: 310 mL    OUT:    Indwelling Catheter - Suprapubic: 2800 mL  Total OUT: 2800 mL    Total NET: -2490 mL      24 Nov 2017 07:01  -  25 Nov 2017 04:29  --------------------------------------------------------  IN:    Oral Fluid: 485 mL  Total IN: 485 mL    OUT:    Indwelling Catheter - Suprapubic: 1400 mL  Total OUT: 1400 mL    Total NET: -915 mL    Impression: 89yo Male with PMH Hypertension, High cholesterol, Depression, Enlarged prostate, Heart attack, and Diabetes mellitus. 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 Admitted with C. diff colitis. Consulted secondary to pyuria and partially blocked SPC, which was changed on 11/22    Plan:  -continue SPC, urine output monitoring and trend renal function  -continue abx  -continue current medical management  -d/w Dr Jett

## 2017-11-25 NOTE — PROGRESS NOTE ADULT - SUBJECTIVE AND OBJECTIVE BOX
Patient is a 89y old  Male who presents with a chief complaint of diarrhea (09 Nov 2017 14:30)      INTERVAL HPI/OVERNIGHT EVENTS: no events overnight     MEDICATIONS  (STANDING):  aspirin enteric coated 81 milliGRAM(s) Oral daily  atorvastatin 10 milliGRAM(s) Oral at bedtime  BACItracin   Ointment 1 Application(s) Topical daily  carvedilol 6.25 milliGRAM(s) Oral every 12 hours  dextrose 5%. 1000 milliLiter(s) (50 mL/Hr) IV Continuous <Continuous>  dextrose 50% Injectable 12.5 Gram(s) IV Push once  dextrose 50% Injectable 25 Gram(s) IV Push once  dextrose 50% Injectable 25 Gram(s) IV Push once  fidaxomicin 200 milliGRAM(s) Oral two times a day  finasteride 5 milliGRAM(s) Oral daily  furosemide    Tablet 20 milliGRAM(s) Oral daily  heparin  Injectable 5000 Unit(s) SubCutaneous every 12 hours  insulin lispro (HumaLOG) corrective regimen sliding scale   SubCutaneous three times a day before meals  insulin lispro (HumaLOG) corrective regimen sliding scale   SubCutaneous at bedtime  lactobacillus acidophilus 1 Tablet(s) Oral two times a day with meals  losartan 50 milliGRAM(s) Oral daily  nystatin Cream 1 Application(s) Topical two times a day  sertraline 50 milliGRAM(s) Oral daily    MEDICATIONS  (PRN):  acetaminophen   Tablet 650 milliGRAM(s) Oral every 6 hours PRN For Temp greater than 38 C (100.4 F)  dextrose Gel 1 Dose(s) Oral once PRN Blood Glucose LESS THAN 70 milliGRAM(s)/deciliter  glucagon  Injectable 1 milliGRAM(s) IntraMuscular once PRN Glucose LESS THAN 70 milligrams/deciliter      Allergies    Cipro I.V. (Other)  indomethacin (Other)  Keflex (Unknown)  Macrobid (Unknown)  Plavix (Diarrhea; Other; Rash)  pravastatin (Unknown)  sulfamethoxazole (Unknown)    Intolerances          Vital Signs Last 24 Hrs  T(C): 36.4 (25 Nov 2017 05:31), Max: 36.7 (24 Nov 2017 23:26)  T(F): 97.5 (25 Nov 2017 05:31), Max: 98.1 (24 Nov 2017 23:26)  HR: 68 (25 Nov 2017 05:31) (68 - 71)  BP: 138/72 (25 Nov 2017 05:31) (110/56 - 153/70)  BP(mean): --  RR: 18 (25 Nov 2017 05:31) (16 - 18)  SpO2: 98% (25 Nov 2017 05:31) (98% - 100%)    PHYSICAL EXAM:  GENERAL: NAD, well-groomed, well-developed  HEAD:  Atraumatic, Normocephalic  EYES: EOMI, PERRLA, conjunctiva and sclera clear  ENMT: No tonsillar erythema, exudates, or enlargement; Moist mucous membranes, Good dentition, No lesions  NECK: Supple, No JVD, Normal thyroid  NERVOUS SYSTEM:  Alert & Oriented X3, Good concentration; Motor Strength 5/5 B/L upper and lower extremities; DTRs 2+ intact and symmetric  CHEST/LUNG: Clear to percussion bilaterally; No rales, rhonchi, wheezing, or rubs  HEART: Regular rate and rhythm; No murmurs, rubs, or gallops  ABDOMEN: Soft, Nontender, Nondistended; Bowel sounds present  EXTREMITIES:  2+ Peripheral Pulses, No clubbing, cyanosis, or edema  LYMPH: No lymphadenopathy noted  SKIN: No rashes or lesions    LABS:                        9.5    9.2   )-----------( 221      ( 24 Nov 2017 06:40 )             28.5               CAPILLARY BLOOD GLUCOSE      POCT Blood Glucose.: 242 mg/dL (25 Nov 2017 10:56)  POCT Blood Glucose.: 185 mg/dL (25 Nov 2017 07:45)  POCT Blood Glucose.: 185 mg/dL (24 Nov 2017 23:12)  POCT Blood Glucose.: 215 mg/dL (24 Nov 2017 15:39)      RADIOLOGY & ADDITIONAL TESTS:    Imaging Personally Reviewed:  [ ] YES  [ ] NO    Consultant(s) Notes Reviewed:  [ ] YES  [ ] NO    Care Discussed with Consultants/Other Providers [ ] YES  [ ] NO

## 2017-11-25 NOTE — PROGRESS NOTE ADULT - PROBLEM SELECTOR PLAN 1
improving, stool formed today  secondary to cdiff. diarrhea resolved . currently afebrile    to complete dificid 11/28  per GI 11/14/17 started Dificid  Placement pending, likely d/c monday as per case mngmnt

## 2017-11-26 LAB
ANION GAP SERPL CALC-SCNC: 8 MMOL/L — SIGNIFICANT CHANGE UP (ref 5–17)
BUN SERPL-MCNC: 23 MG/DL — SIGNIFICANT CHANGE UP (ref 7–23)
CALCIUM SERPL-MCNC: 8.6 MG/DL — SIGNIFICANT CHANGE UP (ref 8.5–10.1)
CHLORIDE SERPL-SCNC: 99 MMOL/L — SIGNIFICANT CHANGE UP (ref 96–108)
CO2 SERPL-SCNC: 32 MMOL/L — HIGH (ref 22–31)
CREAT SERPL-MCNC: 1.31 MG/DL — HIGH (ref 0.5–1.3)
GLUCOSE SERPL-MCNC: 156 MG/DL — HIGH (ref 70–99)
HCT VFR BLD CALC: 30.5 % — LOW (ref 39–50)
HGB BLD-MCNC: 9.7 G/DL — LOW (ref 13–17)
MAGNESIUM SERPL-MCNC: 1.9 MG/DL — SIGNIFICANT CHANGE UP (ref 1.6–2.6)
MCHC RBC-ENTMCNC: 31.6 GM/DL — LOW (ref 32–36)
MCHC RBC-ENTMCNC: 32.9 PG — SIGNIFICANT CHANGE UP (ref 27–34)
MCV RBC AUTO: 104 FL — HIGH (ref 80–100)
PLATELET # BLD AUTO: 201 K/UL — SIGNIFICANT CHANGE UP (ref 150–400)
POTASSIUM SERPL-MCNC: 4.8 MMOL/L — SIGNIFICANT CHANGE UP (ref 3.5–5.3)
POTASSIUM SERPL-SCNC: 4.8 MMOL/L — SIGNIFICANT CHANGE UP (ref 3.5–5.3)
RBC # BLD: 2.94 M/UL — LOW (ref 4.2–5.8)
RBC # FLD: 15.3 % — HIGH (ref 11–15)
SODIUM SERPL-SCNC: 139 MMOL/L — SIGNIFICANT CHANGE UP (ref 135–145)
WBC # BLD: 5.7 K/UL — SIGNIFICANT CHANGE UP (ref 3.8–10.5)
WBC # FLD AUTO: 5.7 K/UL — SIGNIFICANT CHANGE UP (ref 3.8–10.5)

## 2017-11-26 PROCEDURE — 99233 SBSQ HOSP IP/OBS HIGH 50: CPT

## 2017-11-26 RX ORDER — FIDAXOMICIN 200 MG/5ML
200 GRANULE, FOR SUSPENSION ORAL
Qty: 0 | Refills: 0 | Status: COMPLETED | OUTPATIENT
Start: 2017-11-26 | End: 2017-11-28

## 2017-11-26 RX ADMIN — Medication 1 TABLET(S): at 16:31

## 2017-11-26 RX ADMIN — HEPARIN SODIUM 5000 UNIT(S): 5000 INJECTION INTRAVENOUS; SUBCUTANEOUS at 17:13

## 2017-11-26 RX ADMIN — FIDAXOMICIN 200 MILLIGRAM(S): 200 GRANULE, FOR SUSPENSION ORAL at 05:18

## 2017-11-26 RX ADMIN — NYSTATIN CREAM 1 APPLICATION(S): 100000 CREAM TOPICAL at 05:20

## 2017-11-26 RX ADMIN — HEPARIN SODIUM 5000 UNIT(S): 5000 INJECTION INTRAVENOUS; SUBCUTANEOUS at 05:18

## 2017-11-26 RX ADMIN — Medication 81 MILLIGRAM(S): at 11:19

## 2017-11-26 RX ADMIN — Medication 1 TABLET(S): at 08:31

## 2017-11-26 RX ADMIN — Medication 2: at 11:19

## 2017-11-26 RX ADMIN — Medication 2: at 08:31

## 2017-11-26 RX ADMIN — FIDAXOMICIN 200 MILLIGRAM(S): 200 GRANULE, FOR SUSPENSION ORAL at 17:13

## 2017-11-26 RX ADMIN — LOSARTAN POTASSIUM 50 MILLIGRAM(S): 100 TABLET, FILM COATED ORAL at 05:18

## 2017-11-26 RX ADMIN — Medication 20 MILLIGRAM(S): at 05:18

## 2017-11-26 RX ADMIN — CARVEDILOL PHOSPHATE 6.25 MILLIGRAM(S): 80 CAPSULE, EXTENDED RELEASE ORAL at 17:13

## 2017-11-26 RX ADMIN — Medication 1 APPLICATION(S): at 11:19

## 2017-11-26 RX ADMIN — CARVEDILOL PHOSPHATE 6.25 MILLIGRAM(S): 80 CAPSULE, EXTENDED RELEASE ORAL at 05:18

## 2017-11-26 RX ADMIN — FINASTERIDE 5 MILLIGRAM(S): 5 TABLET, FILM COATED ORAL at 11:19

## 2017-11-26 RX ADMIN — NYSTATIN CREAM 1 APPLICATION(S): 100000 CREAM TOPICAL at 17:16

## 2017-11-26 RX ADMIN — Medication 4: at 15:11

## 2017-11-26 RX ADMIN — SERTRALINE 50 MILLIGRAM(S): 25 TABLET, FILM COATED ORAL at 11:19

## 2017-11-26 RX ADMIN — ATORVASTATIN CALCIUM 10 MILLIGRAM(S): 80 TABLET, FILM COATED ORAL at 22:20

## 2017-11-26 NOTE — PROGRESS NOTE ADULT - PROBLEM SELECTOR PLAN 3
renew statin
continue with statin
renew home meds
renew statin
continue with statin

## 2017-11-26 NOTE — PROGRESS NOTE ADULT - PROBLEM SELECTOR PROBLEM 3
High cholesterol
Type 2 diabetes mellitus without complication, without long-term current use of insulin
High cholesterol
High cholesterol
Essential hypertension
High cholesterol

## 2017-11-26 NOTE — PROGRESS NOTE ADULT - PROBLEM SELECTOR PROBLEM 1
Colitis
Colitis
Clostridium difficile colitis
Colitis

## 2017-11-26 NOTE — PROGRESS NOTE ADULT - SUBJECTIVE AND OBJECTIVE BOX
Patient seen and examined at bedside in no distress.   Resting comfortably.    T(F): 97.8 (11-26-17 @ 05:15), Max: 98.2 (11-25-17 @ 18:15)  HR: 68 (11-26-17 @ 05:15) (60 - 68)  BP: 159/72 (11-26-17 @ 05:15) (89/50 - 159/72)  RR: 16 (11-26-17 @ 05:15) (16 - 18)  SpO2: 96% (11-26-17 @ 05:15) (96% - 97%)    PHYSICAL EXAM:  Gen: Awake, NAD  CV: +S1S2 RRR  Lung: Respirations nonlabored  Abdomen: Soft, NTND  Extremities: No pedal edema or calf tenderness noted   : SPC in place draining clear, yellow urine. Output: 1900cc/24hrs    LABS:                        9.7    5.7   )-----------( 201      ( 26 Nov 2017 06:41 )             30.5     11-26    139  |  99  |  23  ----------------------------<  156<H>  4.8   |  32<H>  |  1.31<H>    Ca    8.6      26 Nov 2017 06:41  Mg     1.9     11-26      Impression: 89 year old male PMH chronic UR and hypotonic bladder with SPC, HTN, HLD, DM, BPH, MI a/w C diff colitis, s/p SPC exchange 11/22 due to partially obstructed SPC and pyuria (resolved)   Plan:  - continue SPC, monitor urine output  - abx  - medical management  - will d/w Dr. Jett

## 2017-11-26 NOTE — PROGRESS NOTE ADULT - PROBLEM SELECTOR PLAN 2
bp stable
intravenous hydration
bp stable

## 2017-11-26 NOTE — PROGRESS NOTE ADULT - SUBJECTIVE AND OBJECTIVE BOX
Patient is a 89y old  Male who presents with a chief complaint of diarrhea (09 Nov 2017 14:30)      INTERVAL HPI/OVERNIGHT EVENTS: no events overnight     MEDICATIONS  (STANDING):  aspirin enteric coated 81 milliGRAM(s) Oral daily  atorvastatin 10 milliGRAM(s) Oral at bedtime  BACItracin   Ointment 1 Application(s) Topical daily  carvedilol 6.25 milliGRAM(s) Oral every 12 hours  dextrose 5%. 1000 milliLiter(s) (50 mL/Hr) IV Continuous <Continuous>  dextrose 50% Injectable 12.5 Gram(s) IV Push once  dextrose 50% Injectable 25 Gram(s) IV Push once  dextrose 50% Injectable 25 Gram(s) IV Push once  fidaxomicin 200 milliGRAM(s) Oral two times a day  finasteride 5 milliGRAM(s) Oral daily  furosemide    Tablet 20 milliGRAM(s) Oral daily  heparin  Injectable 5000 Unit(s) SubCutaneous every 12 hours  insulin lispro (HumaLOG) corrective regimen sliding scale   SubCutaneous three times a day before meals  insulin lispro (HumaLOG) corrective regimen sliding scale   SubCutaneous at bedtime  lactobacillus acidophilus 1 Tablet(s) Oral two times a day with meals  losartan 50 milliGRAM(s) Oral daily  nystatin Cream 1 Application(s) Topical two times a day  sertraline 50 milliGRAM(s) Oral daily    MEDICATIONS  (PRN):  acetaminophen   Tablet 650 milliGRAM(s) Oral every 6 hours PRN For Temp greater than 38 C (100.4 F)  dextrose Gel 1 Dose(s) Oral once PRN Blood Glucose LESS THAN 70 milliGRAM(s)/deciliter  glucagon  Injectable 1 milliGRAM(s) IntraMuscular once PRN Glucose LESS THAN 70 milligrams/deciliter      Allergies    Cipro I.V. (Other)  indomethacin (Other)  Keflex (Unknown)  Macrobid (Unknown)  Plavix (Diarrhea; Other; Rash)  pravastatin (Unknown)  sulfamethoxazole (Unknown)    Intolerances            Vital Signs Last 24 Hrs  T(C): 36.6 (26 Nov 2017 05:15), Max: 36.8 (25 Nov 2017 18:15)  T(F): 97.8 (26 Nov 2017 05:15), Max: 98.2 (25 Nov 2017 18:15)  HR: 68 (26 Nov 2017 05:15) (60 - 68)  BP: 159/72 (26 Nov 2017 05:15) (89/50 - 159/72)  BP(mean): --  RR: 16 (26 Nov 2017 05:15) (16 - 18)  SpO2: 96% (26 Nov 2017 05:15) (96% - 97%)    PHYSICAL EXAM:  GENERAL: NAD, well-groomed, well-developed  HEAD:  Atraumatic, Normocephalic  EYES: EOMI, PERRLA, conjunctiva and sclera clear  ENMT: No tonsillar erythema, exudates, or enlargement; Moist mucous membranes, Good dentition, No lesions  NECK: Supple, No JVD, Normal thyroid  NERVOUS SYSTEM:  Alert & Oriented X3, Good concentration; Motor Strength 5/5 B/L upper and lower extremities; DTRs 2+ intact and symmetric  CHEST/LUNG: Clear to percussion bilaterally; No rales, rhonchi, wheezing, or rubs  HEART: Regular rate and rhythm; No murmurs, rubs, or gallops  ABDOMEN: Soft, Nontender, Nondistended; Bowel sounds present, spc clean  EXTREMITIES:  2+ Peripheral Pulses, No clubbing, cyanosis, or edema  LYMPH: No lymphadenopathy noted  SKIN: No rashes or lesions    LABS:                        9.7    5.7   )-----------( 201      ( 26 Nov 2017 06:41 )             30.5     11-26    139  |  99  |  23  ----------------------------<  156<H>  4.8   |  32<H>  |  1.31<H>    Ca    8.6      26 Nov 2017 06:41  Mg     1.9     11-26          CAPILLARY BLOOD GLUCOSE      POCT Blood Glucose.: 199 mg/dL (26 Nov 2017 08:24)  POCT Blood Glucose.: 178 mg/dL (25 Nov 2017 21:47)  POCT Blood Glucose.: 176 mg/dL (25 Nov 2017 15:38)  POCT Blood Glucose.: 242 mg/dL (25 Nov 2017 10:56)      RADIOLOGY & ADDITIONAL TESTS:    Imaging Personally Reviewed:  [ ] YES  [ ] NO    Consultant(s) Notes Reviewed:  [ ] YES  [ ] NO    Care Discussed with Consultants/Other Providers [ ] YES  [ ] NO

## 2017-11-26 NOTE — PROGRESS NOTE ADULT - PROBLEM SELECTOR PROBLEM 2
Systolic and diastolic CHF, chronic
Essential hypertension
Essential hypertension
Dehydration
Essential hypertension

## 2017-11-27 ENCOUNTER — TRANSCRIPTION ENCOUNTER (OUTPATIENT)
Age: 82
End: 2017-11-27

## 2017-11-27 PROCEDURE — 99239 HOSP IP/OBS DSCHRG MGMT >30: CPT

## 2017-11-27 RX ORDER — ACETAMINOPHEN 500 MG
2 TABLET ORAL
Qty: 0 | Refills: 0 | COMMUNITY
Start: 2017-11-27

## 2017-11-27 RX ADMIN — FINASTERIDE 5 MILLIGRAM(S): 5 TABLET, FILM COATED ORAL at 11:51

## 2017-11-27 RX ADMIN — Medication 1 TABLET(S): at 16:46

## 2017-11-27 RX ADMIN — NYSTATIN CREAM 1 APPLICATION(S): 100000 CREAM TOPICAL at 18:45

## 2017-11-27 RX ADMIN — CARVEDILOL PHOSPHATE 6.25 MILLIGRAM(S): 80 CAPSULE, EXTENDED RELEASE ORAL at 06:12

## 2017-11-27 RX ADMIN — Medication 81 MILLIGRAM(S): at 11:50

## 2017-11-27 RX ADMIN — Medication 1 APPLICATION(S): at 11:50

## 2017-11-27 RX ADMIN — SERTRALINE 50 MILLIGRAM(S): 25 TABLET, FILM COATED ORAL at 11:51

## 2017-11-27 RX ADMIN — Medication 2: at 08:18

## 2017-11-27 RX ADMIN — FIDAXOMICIN 200 MILLIGRAM(S): 200 GRANULE, FOR SUSPENSION ORAL at 07:02

## 2017-11-27 RX ADMIN — Medication 2: at 11:49

## 2017-11-27 RX ADMIN — FIDAXOMICIN 200 MILLIGRAM(S): 200 GRANULE, FOR SUSPENSION ORAL at 19:02

## 2017-11-27 RX ADMIN — Medication 20 MILLIGRAM(S): at 06:12

## 2017-11-27 RX ADMIN — Medication 4: at 16:45

## 2017-11-27 RX ADMIN — HEPARIN SODIUM 5000 UNIT(S): 5000 INJECTION INTRAVENOUS; SUBCUTANEOUS at 18:46

## 2017-11-27 RX ADMIN — CARVEDILOL PHOSPHATE 6.25 MILLIGRAM(S): 80 CAPSULE, EXTENDED RELEASE ORAL at 18:46

## 2017-11-27 RX ADMIN — Medication 1 TABLET(S): at 08:18

## 2017-11-27 RX ADMIN — NYSTATIN CREAM 1 APPLICATION(S): 100000 CREAM TOPICAL at 06:15

## 2017-11-27 RX ADMIN — HEPARIN SODIUM 5000 UNIT(S): 5000 INJECTION INTRAVENOUS; SUBCUTANEOUS at 06:12

## 2017-11-27 RX ADMIN — LOSARTAN POTASSIUM 50 MILLIGRAM(S): 100 TABLET, FILM COATED ORAL at 06:12

## 2017-11-27 NOTE — PROGRESS NOTE ADULT - SUBJECTIVE AND OBJECTIVE BOX
Pt stable - no complaints  no diarrhea  to finish dificid tomorrow      MEDICATIONS  (STANDING):  aspirin enteric coated 81 milliGRAM(s) Oral daily  atorvastatin 10 milliGRAM(s) Oral at bedtime  BACItracin   Ointment 1 Application(s) Topical daily  carvedilol 6.25 milliGRAM(s) Oral every 12 hours  dextrose 5%. 1000 milliLiter(s) (50 mL/Hr) IV Continuous <Continuous>  dextrose 50% Injectable 12.5 Gram(s) IV Push once  dextrose 50% Injectable 25 Gram(s) IV Push once  dextrose 50% Injectable 25 Gram(s) IV Push once  fidaxomicin 200 milliGRAM(s) Oral two times a day  finasteride 5 milliGRAM(s) Oral daily  furosemide    Tablet 20 milliGRAM(s) Oral daily  heparin  Injectable 5000 Unit(s) SubCutaneous every 12 hours  insulin lispro (HumaLOG) corrective regimen sliding scale   SubCutaneous three times a day before meals  insulin lispro (HumaLOG) corrective regimen sliding scale   SubCutaneous at bedtime  lactobacillus acidophilus 1 Tablet(s) Oral two times a day with meals  losartan 50 milliGRAM(s) Oral daily  nystatin Cream 1 Application(s) Topical two times a day  sertraline 50 milliGRAM(s) Oral daily    MEDICATIONS  (PRN):  acetaminophen   Tablet 650 milliGRAM(s) Oral every 6 hours PRN For Temp greater than 38 C (100.4 F)  dextrose Gel 1 Dose(s) Oral once PRN Blood Glucose LESS THAN 70 milliGRAM(s)/deciliter  glucagon  Injectable 1 milliGRAM(s) IntraMuscular once PRN Glucose LESS THAN 70 milligrams/deciliter      Allergies    Cipro I.V. (Other)  indomethacin (Other)  Keflex (Unknown)  Macrobid (Unknown)  Plavix (Diarrhea; Other; Rash)  pravastatin (Unknown)  sulfamethoxazole (Unknown)    Intolerances        Vital Signs Last 24 Hrs  T(C): 36.6 (27 Nov 2017 17:18), Max: 36.6 (27 Nov 2017 05:13)  T(F): 97.9 (27 Nov 2017 17:18), Max: 97.9 (27 Nov 2017 17:18)  HR: 80 (27 Nov 2017 17:18) (60 - 80)  BP: 124/76 (27 Nov 2017 17:18) (117/58 - 132/70)  BP(mean): --  RR: 16 (27 Nov 2017 17:18) (16 - 18)  SpO2: 96% (27 Nov 2017 17:18) (95% - 99%)    PHYSICAL EXAM:  General: NAD.  CVS: S1, S2  Chest: air entry bilaterally present  Abd: BS present, soft, non-tender      LABS:                        9.7    5.7   )-----------( 201      ( 26 Nov 2017 06:41 )             30.5     11-26    139  |  99  |  23  ----------------------------<  156<H>  4.8   |  32<H>  |  1.31<H>    Ca    8.6      26 Nov 2017 06:41  Mg     1.9     11-26      finish dificid tomorrow  discharge planning

## 2017-11-27 NOTE — DISCHARGE NOTE ADULT - SECONDARY DIAGNOSIS.
Enlarged prostate Clostridium difficile colitis Hypokalemia Hypomagnesemia Hypophosphatemia Severe protein-calorie malnutrition

## 2017-11-27 NOTE — DISCHARGE NOTE ADULT - PATIENT PORTAL LINK FT
“You can access the [employer] Patient Portal, offered by [facility], by registering with the following website: [email or web address]”

## 2017-11-27 NOTE — PROGRESS NOTE ADULT - SUBJECTIVE AND OBJECTIVE BOX
Pt seen, examined at chair at bedside. No complaints of pain or other symptoms.     ICU Vital Signs Last 24 Hrs  T(C): 36.6 (27 Nov 2017 05:13), Max: 36.6 (27 Nov 2017 05:13)  T(F): 97.8 (27 Nov 2017 05:13), Max: 97.8 (27 Nov 2017 05:13)  HR: 64 (27 Nov 2017 05:13) (60 - 64)  BP: 128/68 (27 Nov 2017 05:13) (107/51 - 128/68)  RR: 16 (27 Nov 2017 05:13) (16 - 16)  SpO2: 98% (27 Nov 2017 05:13) (98% - 99%)    PE:  General - No acute distress   Lungs - Breath sounds vesicular b/l, no wheezing, rhonci or rales   Cardiac- No murmurs, rubs or gallops, normal S1/S2  ABD- Soft and non tender, normoactive bowel sounds x 4 quadrants. No masses or tenderness  PV- No edema    - SPC in place, draining clear yellow urine. mild sediment. urine output - 1700cc/24hr                          9.7    5.7   )-----------( 201      ( 26 Nov 2017 06:41 )             30.5     11-26    139  |  99  |  23  ----------------------------<  156<H>  4.8   |  32<H>  |  1.31<H>    Ca    8.6      26 Nov 2017 06:41  Mg     1.9     11-26

## 2017-11-27 NOTE — DISCHARGE NOTE ADULT - MEDICATION SUMMARY - MEDICATIONS TO TAKE
I will START or STAY ON the medications listed below when I get home from the hospital:    finasteride 5 mg oral tablet  -- 1 tab(s) by mouth once a day  -- Indication: For bph    Aspirin Low Dose 81 mg oral delayed release tablet  -- 1 tab(s) by mouth once a day  -- Indication: For preventive     acetaminophen 325 mg oral tablet  -- 2 tab(s) by mouth every 6 hours, As needed, For Temp greater than 38 C (100.4 F)  -- Indication: For preventive    losartan 50 mg oral tablet  --  by mouth   -- Indication: For Essential hypertension    sertraline 50 mg oral tablet  -- 1 tab(s) by mouth once a day  -- Indication: For Depresion    metFORMIN 500 mg oral tablet, extended release  --  by mouth   -- Indication: For Dm    glipiZIDE 10 mg oral tablet  -- 1 tab(s) by mouth 2 times a day  -- Indication: For Dm    Lipitor 10 mg oral tablet  -- 1 tab(s) by mouth once a day (at bedtime)  -- Indication: For Hld    carvedilol 25 mg oral tablet  -- 1 tab(s) by mouth every 12 hours  -- Indication: For Essential hypertension    furosemide 20 mg oral tablet  -- 1 tab(s) by mouth once a day  -- Avoid prolonged or excessive exposure to direct and/or artificial sunlight while taking this medication.  It is very important that you take or use this exactly as directed.  Do not skip doses or discontinue unless directed by your doctor.  It may be advisable to drink a full glass orange juice or eat a banana daily while taking this medication.    -- Indication: For Essential hypertension    docusate sodium 100 mg oral capsule  -- 1 cap(s) by mouth 2 times a day  -- Indication: For preventive    Dulcolax Stool Softener 100 mg oral capsule  --  by mouth , As Needed  -- Indication: For preventive    multivitamin  -- 1 tab(s) by mouth once a day  -- Indication: For vitamin    Vitamin B12 1000 mcg oral tablet  -- 1 tab(s) by mouth once a day  -- Indication: For vitamin

## 2017-11-27 NOTE — DISCHARGE NOTE ADULT - CARE PLAN
Principal Discharge DX:	Colitis  Goal:	resolved, completed dificid  Instructions for follow-up, activity and diet:	f/u pcp, activity as tolerated, heart healthy diet  Secondary Diagnosis:	Enlarged prostate  Secondary Diagnosis:	Clostridium difficile colitis  Secondary Diagnosis:	Hypokalemia  Secondary Diagnosis:	Hypomagnesemia  Secondary Diagnosis:	Hypophosphatemia  Secondary Diagnosis:	Severe protein-calorie malnutrition

## 2017-11-27 NOTE — DISCHARGE NOTE ADULT - HOSPITAL COURSE
89m with recent admission to skilled nursing facility with profuse diarrhea  up to 10 x /day      Problem/Plan - 1:  ·  Problem: Colitis.  Plan: improving, stool formed today  secondary to cdiff. diarrhea resolved . currently afebrile    completed dificid  per GI 11/14/17 started Dificid  Placement pending, likely d/c monday as per case mngmnt.      Problem/Plan - 2:  ·  Problem: Essential hypertension.  Plan: bp stable.      Problem/Plan - 3:  ·  Problem: High cholesterol.  Plan: continue with statin.      Problem/Plan - 4:  ·  Problem: Hypomagnesemia.  Plan: resolved.      Problem/Plan - 5:  ·  Problem: Hypophosphatemia.  Plan: resolved.      Problem/Plan - 6:  Problem: Enlarged prostate. Plan: renew home meds  SPC changed.     Problem/Plan - 7:  ·  Problem: Type 2 diabetes mellitus without complication, without long-term current use of insulin.  Plan: home meds.      Problem/Plan - 8:  ·  Problem: Anemia.  Plan: pt hgb low but stable    occult blood negative times two       Problem/Plan - 9:  ·  Problem: Systolic and diastolic CHF, chronic.  Plan: lasix 20 mg   acei   coreg     Problem/Plan - 10:  Problem: Severe protein-calorie malnutrition. Plan; healthy shake supplement  encourage PO intake.    Attending Attestation:   35 minutes spent on total d/c encounter; more than 50% of the visit was spent counseling and/or coordinating care by the attending physician.

## 2017-11-27 NOTE — PROGRESS NOTE ADULT - ASSESSMENT
89m with recent admission to skilled nursing facility with profuse diarrhea  up to 10 x /day
89m with recent admission to skilled nursing facility with profuse diarrhea  up to 10 x /day
continue cystostomy.
89m with recent admission to skilled nursing facility with profuse diarrhea  up to 10 x /day
89 yr old male seen with
assesment   89 yo male pmhx DM, HTN, dementia, chronic UR and SPC, consulted for pyuria now resolved. Admitted for C-Diff, currently treated.     - Plan:   cont difficid  cont medical management   cont proscar   cont SPC and urine output   Trend BUN/Cr   D/c planning per medicine   Will discuss with Dr. Jett
89m with recent admission to skilled nursing facility with profuse diarrhea  up to 10 x /day
89m with recent admission to skilled nursing facility with profuse diarrhea  up to 10 x /day

## 2017-11-28 VITALS
SYSTOLIC BLOOD PRESSURE: 117 MMHG | HEART RATE: 60 BPM | DIASTOLIC BLOOD PRESSURE: 48 MMHG | RESPIRATION RATE: 20 BRPM | OXYGEN SATURATION: 97 % | TEMPERATURE: 96 F

## 2017-11-28 PROCEDURE — 71010: CPT | Mod: 26

## 2017-11-28 PROCEDURE — 99239 HOSP IP/OBS DSCHRG MGMT >30: CPT

## 2017-11-28 RX ADMIN — Medication 81 MILLIGRAM(S): at 11:23

## 2017-11-28 RX ADMIN — SERTRALINE 50 MILLIGRAM(S): 25 TABLET, FILM COATED ORAL at 11:23

## 2017-11-28 RX ADMIN — Medication 1 APPLICATION(S): at 11:23

## 2017-11-28 RX ADMIN — LOSARTAN POTASSIUM 50 MILLIGRAM(S): 100 TABLET, FILM COATED ORAL at 05:16

## 2017-11-28 RX ADMIN — Medication 1 TABLET(S): at 08:02

## 2017-11-28 RX ADMIN — Medication 20 MILLIGRAM(S): at 05:16

## 2017-11-28 RX ADMIN — NYSTATIN CREAM 1 APPLICATION(S): 100000 CREAM TOPICAL at 05:16

## 2017-11-28 RX ADMIN — FIDAXOMICIN 200 MILLIGRAM(S): 200 GRANULE, FOR SUSPENSION ORAL at 05:17

## 2017-11-28 RX ADMIN — CARVEDILOL PHOSPHATE 6.25 MILLIGRAM(S): 80 CAPSULE, EXTENDED RELEASE ORAL at 05:16

## 2017-11-28 RX ADMIN — FINASTERIDE 5 MILLIGRAM(S): 5 TABLET, FILM COATED ORAL at 11:23

## 2017-11-28 RX ADMIN — Medication 4: at 11:22

## 2017-11-28 RX ADMIN — HEPARIN SODIUM 5000 UNIT(S): 5000 INJECTION INTRAVENOUS; SUBCUTANEOUS at 05:16

## 2017-11-28 RX ADMIN — Medication 2: at 08:02

## 2017-11-28 NOTE — PROGRESS NOTE ADULT - SUBJECTIVE AND OBJECTIVE BOX
Pt stable - finishing 14 day course of dificid  No diarrhea- pt with formed stool      MEDICATIONS  (STANDING):  aspirin enteric coated 81 milliGRAM(s) Oral daily  atorvastatin 10 milliGRAM(s) Oral at bedtime  BACItracin   Ointment 1 Application(s) Topical daily  carvedilol 6.25 milliGRAM(s) Oral every 12 hours  dextrose 5%. 1000 milliLiter(s) (50 mL/Hr) IV Continuous <Continuous>  dextrose 50% Injectable 12.5 Gram(s) IV Push once  dextrose 50% Injectable 25 Gram(s) IV Push once  dextrose 50% Injectable 25 Gram(s) IV Push once  finasteride 5 milliGRAM(s) Oral daily  furosemide    Tablet 20 milliGRAM(s) Oral daily  heparin  Injectable 5000 Unit(s) SubCutaneous every 12 hours  insulin lispro (HumaLOG) corrective regimen sliding scale   SubCutaneous three times a day before meals  insulin lispro (HumaLOG) corrective regimen sliding scale   SubCutaneous at bedtime  lactobacillus acidophilus 1 Tablet(s) Oral two times a day with meals  losartan 50 milliGRAM(s) Oral daily  nystatin Cream 1 Application(s) Topical two times a day  sertraline 50 milliGRAM(s) Oral daily    MEDICATIONS  (PRN):  acetaminophen   Tablet 650 milliGRAM(s) Oral every 6 hours PRN For Temp greater than 38 C (100.4 F)  dextrose Gel 1 Dose(s) Oral once PRN Blood Glucose LESS THAN 70 milliGRAM(s)/deciliter  glucagon  Injectable 1 milliGRAM(s) IntraMuscular once PRN Glucose LESS THAN 70 milligrams/deciliter      Allergies    Cipro I.V. (Other)  indomethacin (Other)  Keflex (Unknown)  Macrobid (Unknown)  Plavix (Diarrhea; Other; Rash)  pravastatin (Unknown)  sulfamethoxazole (Unknown)    Intolerances        Vital Signs Last 24 Hrs  T(C): 36.4 (28 Nov 2017 05:10), Max: 36.6 (27 Nov 2017 17:18)  T(F): 97.6 (28 Nov 2017 05:10), Max: 97.9 (27 Nov 2017 17:18)  HR: 68 (28 Nov 2017 05:10) (67 - 88)  BP: 163/58 (28 Nov 2017 05:10) (124/76 - 163/58)  BP(mean): --  RR: 16 (28 Nov 2017 05:10) (16 - 18)  SpO2: 98% (28 Nov 2017 05:10) (95% - 99%)    PHYSICAL EXAM:  General: NAD.  CVS: S1, S2  Chest: air entry bilaterally present  Abd: BS present, soft, non-tender    No diarrhea  finished Dificid  Stable from GI standpoint  please re-consult GI if needed            RADIOLOGY & ADDITIONAL TESTS:

## 2017-11-28 NOTE — PROGRESS NOTE ADULT - PROVIDER SPECIALTY LIST ADULT
Gastroenterology
Hospitalist
Infectious Disease
Palliative Care
Urology
Hospitalist

## 2017-11-29 RX ORDER — SERTRALINE 25 MG/1
1 TABLET, FILM COATED ORAL
Qty: 30 | Refills: 0 | OUTPATIENT
Start: 2017-11-29 | End: 2017-12-29

## 2017-11-29 RX ORDER — PREGABALIN 225 MG/1
1 CAPSULE ORAL
Qty: 30 | Refills: 0 | OUTPATIENT
Start: 2017-11-29 | End: 2017-12-29

## 2017-11-29 RX ORDER — ASPIRIN/CALCIUM CARB/MAGNESIUM 324 MG
1 TABLET ORAL
Qty: 30 | Refills: 0 | OUTPATIENT
Start: 2017-11-29 | End: 2017-12-29

## 2017-11-29 RX ORDER — LOSARTAN POTASSIUM 100 MG/1
1 TABLET, FILM COATED ORAL
Qty: 30 | Refills: 0 | OUTPATIENT
Start: 2017-11-29 | End: 2017-12-29

## 2017-11-29 RX ORDER — ATORVASTATIN CALCIUM 80 MG/1
1 TABLET, FILM COATED ORAL
Qty: 30 | Refills: 0 | OUTPATIENT
Start: 2017-11-29 | End: 2017-12-29

## 2017-11-29 RX ORDER — DOCUSATE SODIUM 100 MG
1 CAPSULE ORAL
Qty: 60 | Refills: 0 | OUTPATIENT
Start: 2017-11-29 | End: 2017-12-29

## 2017-11-29 RX ORDER — FINASTERIDE 5 MG/1
1 TABLET, FILM COATED ORAL
Qty: 30 | Refills: 0 | OUTPATIENT
Start: 2017-11-29 | End: 2017-12-29

## 2017-11-29 RX ORDER — METFORMIN HYDROCHLORIDE 850 MG/1
1 TABLET ORAL
Qty: 60 | Refills: 0 | OUTPATIENT
Start: 2017-11-29 | End: 2017-12-29

## 2017-11-29 RX ORDER — CARVEDILOL PHOSPHATE 80 MG/1
1 CAPSULE, EXTENDED RELEASE ORAL
Qty: 60 | Refills: 0 | OUTPATIENT
Start: 2017-11-29 | End: 2017-12-29

## 2017-11-29 RX ORDER — FUROSEMIDE 40 MG
1 TABLET ORAL
Qty: 30 | Refills: 0 | OUTPATIENT
Start: 2017-11-29 | End: 2017-12-29

## 2017-12-01 DIAGNOSIS — N39.0 URINARY TRACT INFECTION, SITE NOT SPECIFIED: ICD-10-CM

## 2017-12-01 DIAGNOSIS — E83.42 HYPOMAGNESEMIA: ICD-10-CM

## 2017-12-01 DIAGNOSIS — F32.9 MAJOR DEPRESSIVE DISORDER, SINGLE EPISODE, UNSPECIFIED: ICD-10-CM

## 2017-12-01 DIAGNOSIS — Z88.1 ALLERGY STATUS TO OTHER ANTIBIOTIC AGENTS STATUS: ICD-10-CM

## 2017-12-01 DIAGNOSIS — E83.39 OTHER DISORDERS OF PHOSPHORUS METABOLISM: ICD-10-CM

## 2017-12-01 DIAGNOSIS — E11.9 TYPE 2 DIABETES MELLITUS WITHOUT COMPLICATIONS: ICD-10-CM
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
## 2017-12-01 DIAGNOSIS — E43 UNSPECIFIED SEVERE PROTEIN-CALORIE MALNUTRITION: ICD-10-CM

## 2017-12-01 DIAGNOSIS — D64.9 ANEMIA, UNSPECIFIED: ICD-10-CM

## 2017-12-01 DIAGNOSIS — E87.6 HYPOKALEMIA: ICD-10-CM

## 2017-12-01 DIAGNOSIS — Z51.5 ENCOUNTER FOR PALLIATIVE CARE: ICD-10-CM
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
## 2017-12-01 DIAGNOSIS — Z95.0 PRESENCE OF CARDIAC PACEMAKER: ICD-10-CM

## 2017-12-01 DIAGNOSIS — Z96.1 PRESENCE OF INTRAOCULAR LENS: ICD-10-CM

## 2017-12-01 DIAGNOSIS — N31.2 FLACCID NEUROPATHIC BLADDER, NOT ELSEWHERE CLASSIFIED: ICD-10-CM

## 2017-12-01 DIAGNOSIS — K57.90 DIVERTICULOSIS OF INTESTINE, PART UNSPECIFIED, WITHOUT PERFORATION OR ABSCESS WITHOUT BLEEDING: ICD-10-CM

## 2017-12-01 DIAGNOSIS — Z88.3 ALLERGY STATUS TO OTHER ANTI-INFECTIVE AGENTS: ICD-10-CM

## 2017-12-01 DIAGNOSIS — E78.00 PURE HYPERCHOLESTEROLEMIA, UNSPECIFIED: ICD-10-CM

## 2017-12-01 DIAGNOSIS — Z98.41 CATARACT EXTRACTION STATUS, RIGHT EYE: ICD-10-CM

## 2017-12-01 DIAGNOSIS — A04.72 ENTEROCOLITIS DUE TO CLOSTRIDIUM DIFFICILE, NOT SPECIFIED AS RECURRENT: ICD-10-CM

## 2017-12-01 DIAGNOSIS — I11.0 HYPERTENSIVE HEART DISEASE WITH HEART FAILURE: ICD-10-CM

## 2017-12-01 DIAGNOSIS — Z79.82 LONG TERM (CURRENT) USE OF ASPIRIN: ICD-10-CM

## 2017-12-01 DIAGNOSIS — N40.0 BENIGN PROSTATIC HYPERPLASIA WITHOUT LOWER URINARY TRACT SYMPTOMS: ICD-10-CM

## 2017-12-01 DIAGNOSIS — Z79.84 LONG TERM (CURRENT) USE OF ORAL HYPOGLYCEMIC DRUGS: ICD-10-CM

## 2017-12-01 DIAGNOSIS — F03.90 UNSPECIFIED DEMENTIA WITHOUT BEHAVIORAL DISTURBANCE: ICD-10-CM

## 2017-12-01 DIAGNOSIS — I25.2 OLD MYOCARDIAL INFARCTION: ICD-10-CM

## 2017-12-01 DIAGNOSIS — I50.42 CHRONIC COMBINED SYSTOLIC (CONGESTIVE) AND DIASTOLIC (CONGESTIVE) HEART FAILURE: ICD-10-CM

## 2017-12-01 DIAGNOSIS — R33.8 OTHER RETENTION OF URINE: ICD-10-CM

## 2017-12-01 DIAGNOSIS — T83.090A OTHER MECHANICAL COMPLICATION OF CYSTOSTOMY CATHETER, INITIAL ENCOUNTER: ICD-10-CM

## 2017-12-01 DIAGNOSIS — R19.7 DIARRHEA, UNSPECIFIED: ICD-10-CM

## 2017-12-12 ENCOUNTER — EMERGENCY (EMERGENCY)
Facility: HOSPITAL | Age: 82
LOS: 1 days | Discharge: HOME | End: 2017-12-12
Admitting: EMERGENCY MEDICINE
Payer: MEDICARE

## 2017-12-12 DIAGNOSIS — Z95.0 PRESENCE OF CARDIAC PACEMAKER: Chronic | ICD-10-CM

## 2017-12-12 PROCEDURE — 51705 CHANGE OF BLADDER TUBE: CPT

## 2017-12-12 PROCEDURE — 99283 EMERGENCY DEPT VISIT LOW MDM: CPT | Mod: 25

## 2017-12-12 PROCEDURE — 82962 GLUCOSE BLOOD TEST: CPT

## 2017-12-25 ENCOUNTER — INPATIENT (INPATIENT)
Facility: HOSPITAL | Age: 82
LOS: 4 days | Discharge: HOME | DRG: 637 | End: 2017-12-30
Attending: INTERNAL MEDICINE | Admitting: INTERNAL MEDICINE
Payer: MEDICARE

## 2017-12-25 DIAGNOSIS — D64.9 ANEMIA, UNSPECIFIED: ICD-10-CM

## 2017-12-25 DIAGNOSIS — R13.10 DYSPHAGIA, UNSPECIFIED: ICD-10-CM

## 2017-12-25 DIAGNOSIS — Z95.0 PRESENCE OF CARDIAC PACEMAKER: Chronic | ICD-10-CM
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
## 2017-12-25 DIAGNOSIS — E16.2 HYPOGLYCEMIA, UNSPECIFIED: ICD-10-CM

## 2017-12-25 DIAGNOSIS — S01.112A LACERATION WITHOUT FOREIGN BODY OF LEFT EYELID AND PERIOCULAR AREA, INITIAL ENCOUNTER: ICD-10-CM

## 2017-12-25 DIAGNOSIS — Z91.02 FOOD ADDITIVES ALLERGY STATUS: ICD-10-CM

## 2017-12-25 DIAGNOSIS — E86.0 DEHYDRATION: ICD-10-CM

## 2017-12-25 DIAGNOSIS — N40.1 BENIGN PROSTATIC HYPERPLASIA WITH LOWER URINARY TRACT SYMPTOMS: ICD-10-CM

## 2017-12-25 DIAGNOSIS — Z79.82 LONG TERM (CURRENT) USE OF ASPIRIN: ICD-10-CM
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
## 2017-12-25 DIAGNOSIS — Z88.2 ALLERGY STATUS TO SULFONAMIDES: ICD-10-CM
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
## 2017-12-25 DIAGNOSIS — Z66 DO NOT RESUSCITATE: ICD-10-CM

## 2017-12-25 DIAGNOSIS — I11.0 HYPERTENSIVE HEART DISEASE WITH HEART FAILURE: ICD-10-CM

## 2017-12-25 DIAGNOSIS — R33.8 OTHER RETENTION OF URINE: ICD-10-CM
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
## 2017-12-25 DIAGNOSIS — Z95.810 PRESENCE OF AUTOMATIC (IMPLANTABLE) CARDIAC DEFIBRILLATOR: ICD-10-CM

## 2017-12-25 DIAGNOSIS — N17.9 ACUTE KIDNEY FAILURE, UNSPECIFIED: ICD-10-CM

## 2017-12-25 DIAGNOSIS — E78.5 HYPERLIPIDEMIA, UNSPECIFIED: ICD-10-CM

## 2017-12-25 DIAGNOSIS — I50.40 UNSPECIFIED COMBINED SYSTOLIC (CONGESTIVE) AND DIASTOLIC (CONGESTIVE) HEART FAILURE: ICD-10-CM

## 2017-12-25 DIAGNOSIS — I25.10 ATHEROSCLEROTIC HEART DISEASE OF NATIVE CORONARY ARTERY WITHOUT ANGINA PECTORIS: ICD-10-CM

## 2017-12-25 DIAGNOSIS — J69.0 PNEUMONITIS DUE TO INHALATION OF FOOD AND VOMIT: ICD-10-CM

## 2017-12-25 DIAGNOSIS — I25.2 OLD MYOCARDIAL INFARCTION: ICD-10-CM

## 2017-12-25 DIAGNOSIS — E11.649 TYPE 2 DIABETES MELLITUS WITH HYPOGLYCEMIA WITHOUT COMA: ICD-10-CM

## 2017-12-25 DIAGNOSIS — Z88.1 ALLERGY STATUS TO OTHER ANTIBIOTIC AGENTS STATUS: ICD-10-CM

## 2017-12-25 DIAGNOSIS — Z79.84 LONG TERM (CURRENT) USE OF ORAL HYPOGLYCEMIC DRUGS: ICD-10-CM

## 2017-12-25 DIAGNOSIS — Z88.8 ALLERGY STATUS TO OTHER DRUGS, MEDICAMENTS AND BIOLOGICAL SUBSTANCES: ICD-10-CM

## 2017-12-25 DIAGNOSIS — C94.6 MYELODYSPLASTIC DISEASE, NOT ELSEWHERE CLASSIFIED: ICD-10-CM

## 2017-12-25 PROCEDURE — 72125 CT NECK SPINE W/O DYE: CPT | Mod: 26

## 2017-12-25 PROCEDURE — 99223 1ST HOSP IP/OBS HIGH 75: CPT

## 2017-12-25 PROCEDURE — 71010: CPT | Mod: 26

## 2017-12-25 PROCEDURE — 70450 CT HEAD/BRAIN W/O DYE: CPT | Mod: 26

## 2017-12-27 PROCEDURE — 71250 CT THORAX DX C-: CPT | Mod: 26

## 2017-12-27 PROCEDURE — 93306 TTE W/DOPPLER COMPLETE: CPT | Mod: 26

## 2017-12-28 PROCEDURE — 99222 1ST HOSP IP/OBS MODERATE 55: CPT

## 2017-12-28 PROCEDURE — 76705 ECHO EXAM OF ABDOMEN: CPT | Mod: 26,RT

## 2017-12-29 PROCEDURE — 74230 X-RAY XM SWLNG FUNCJ C+: CPT | Mod: 26

## 2017-12-29 PROCEDURE — 99232 SBSQ HOSP IP/OBS MODERATE 35: CPT

## 2017-12-30 PROCEDURE — 76705 ECHO EXAM OF ABDOMEN: CPT

## 2017-12-30 PROCEDURE — 84155 ASSAY OF PROTEIN SERUM: CPT

## 2017-12-30 PROCEDURE — 83036 HEMOGLOBIN GLYCOSYLATED A1C: CPT

## 2017-12-30 PROCEDURE — 12013 RPR F/E/E/N/L/M 2.6-5.0 CM: CPT | Mod: XU

## 2017-12-30 PROCEDURE — 84165 PROTEIN E-PHORESIS SERUM: CPT

## 2017-12-30 PROCEDURE — 97116 GAIT TRAINING THERAPY: CPT

## 2017-12-30 PROCEDURE — 83735 ASSAY OF MAGNESIUM: CPT

## 2017-12-30 PROCEDURE — 92611 MOTION FLUOROSCOPY/SWALLOW: CPT

## 2017-12-30 PROCEDURE — 92610 EVALUATE SWALLOWING FUNCTION: CPT

## 2017-12-30 PROCEDURE — 83880 ASSAY OF NATRIURETIC PEPTIDE: CPT

## 2017-12-30 PROCEDURE — 99285 EMERGENCY DEPT VISIT HI MDM: CPT | Mod: 25

## 2017-12-30 PROCEDURE — 84100 ASSAY OF PHOSPHORUS: CPT

## 2017-12-30 PROCEDURE — 83605 ASSAY OF LACTIC ACID: CPT

## 2017-12-30 PROCEDURE — 81003 URINALYSIS AUTO W/O SCOPE: CPT

## 2017-12-30 PROCEDURE — 82728 ASSAY OF FERRITIN: CPT

## 2017-12-30 PROCEDURE — 80053 COMPREHEN METABOLIC PANEL: CPT

## 2017-12-30 PROCEDURE — 74230 X-RAY XM SWLNG FUNCJ C+: CPT

## 2017-12-30 PROCEDURE — 85027 COMPLETE CBC AUTOMATED: CPT

## 2017-12-30 PROCEDURE — 70450 CT HEAD/BRAIN W/O DYE: CPT

## 2017-12-30 PROCEDURE — 96365 THER/PROPH/DIAG IV INF INIT: CPT | Mod: XU

## 2017-12-30 PROCEDURE — 82607 VITAMIN B-12: CPT

## 2017-12-30 PROCEDURE — 51705 CHANGE OF BLADDER TUBE: CPT

## 2017-12-30 PROCEDURE — 93306 TTE W/DOPPLER COMPLETE: CPT

## 2017-12-30 PROCEDURE — 80069 RENAL FUNCTION PANEL: CPT

## 2017-12-30 PROCEDURE — 80048 BASIC METABOLIC PNL TOTAL CA: CPT

## 2017-12-30 PROCEDURE — 86334 IMMUNOFIX E-PHORESIS SERUM: CPT

## 2017-12-30 PROCEDURE — 85025 COMPLETE CBC W/AUTO DIFF WBC: CPT

## 2017-12-30 PROCEDURE — 72125 CT NECK SPINE W/O DYE: CPT

## 2017-12-30 PROCEDURE — 84145 PROCALCITONIN (PCT): CPT

## 2017-12-30 PROCEDURE — 82962 GLUCOSE BLOOD TEST: CPT

## 2017-12-30 PROCEDURE — 97162 PT EVAL MOD COMPLEX 30 MIN: CPT

## 2017-12-30 PROCEDURE — 87086 URINE CULTURE/COLONY COUNT: CPT

## 2017-12-30 PROCEDURE — 71045 X-RAY EXAM CHEST 1 VIEW: CPT

## 2017-12-30 PROCEDURE — 99232 SBSQ HOSP IP/OBS MODERATE 35: CPT

## 2017-12-30 PROCEDURE — 96375 TX/PRO/DX INJ NEW DRUG ADDON: CPT | Mod: XU

## 2017-12-30 PROCEDURE — 71250 CT THORAX DX C-: CPT

## 2019-05-24 NOTE — PROGRESS NOTE ADULT - MINUTES
Called to give test results for patient.   Verified patients  with the nurse.   Informed her of results.    Nurse stated understanding and had no further questions   ----- Message from Haseeb Duncan MD sent at 2019 12:28 PM CDT -----  Please inform patient chest x-ray showed scarring on the lung most likely related to his previous surgery.  Thank you    
60
45
35
35

## 2021-06-01 NOTE — PROGRESS NOTE ADULT - PROBLEM SELECTOR PLAN 6
Chart reviewed. Call to [facility]. Per Hallie formerly [facility], patient received RX on 5/20/21. It looks like patient may have tried to refill prescription and there are no refills which is why patient received the message that he did.     Call to patient. Informed him what writer was told by [facility]. Patient states that the Clonazepam is working very well. Writer will let Dr. Kennedy know so that additional refills can be sent to the pharmacy. Patient has no further questions at this time.    Improving but not within normal limits will replete again on today .

## 2023-08-18 NOTE — DISCHARGE NOTE ADULT - SMOKING EVEN A SINGLE PUFF INCREASES THE LIKELIHOOD OF A FULL RELAPSE, WITHDRAWAL SYMPTOMS PEAK WITHIN 1-2 WEEKS, BUT CAN PERSIST FOR MONTHS
----- Message from Alisha Rocha sent at 8/18/2023 11:13 AM CDT -----  Contact: [phone]  Requesting an RX refill or new RX.  Is this a refill or new RX: refill  RX name and strength (copy/paste from chart):  ALPRAZolam (XANAX) 2 MG Tab  Is this a 30 day or 90 day RX: 30  Pharmacy name and phone # (copy/paste from chart):    [id number] - [place], [place] - [facility]  [address]  [address] [facility] [address]  Phone: [phone] Fax: [phone]  The doctors have asked that we provide their patients with the following 2 reminders -- prescription refills can take up to 72 hours, and a friendly reminder that in the future you can use your [facility] account to request refills: yes      Statement Selected

## 2023-08-30 NOTE — PROGRESS NOTE ADULT - PROBLEM SELECTOR PROBLEM 10
Severe protein-calorie malnutrition Hatchet Flap Text: The defect edges were debeveled with a #15 scalpel blade.  Given the location of the defect, shape of the defect and the proximity to free margins a hatchet flap was deemed most appropriate.  Using a sterile surgical marker, an appropriate hatchet flap was drawn incorporating the defect and placing the expected incisions within the relaxed skin tension lines where possible.    The area thus outlined was incised deep to adipose tissue with a #15 scalpel blade.  The skin margins were undermined to an appropriate distance in all directions utilizing iris scissors.
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
## 2024-02-12 ENCOUNTER — NON-APPOINTMENT (OUTPATIENT)
Age: 89
End: 2024-02-12

## 2024-02-12 RX ORDER — FUROSEMIDE 20 MG/1
20 TABLET ORAL
Refills: 0 | Status: ACTIVE | COMMUNITY

## 2024-02-12 RX ORDER — AMIODARONE HYDROCHLORIDE 200 MG/1
200 TABLET ORAL DAILY
Refills: 0 | Status: ACTIVE | COMMUNITY

## 2024-02-12 RX ORDER — AMLODIPINE BESYLATE 5 MG/1
5 TABLET ORAL DAILY
Refills: 0 | Status: ACTIVE | COMMUNITY

## 2024-02-12 RX ORDER — METOPROLOL TARTRATE 25 MG/1
25 TABLET, FILM COATED ORAL TWICE DAILY
Refills: 0 | Status: ACTIVE | COMMUNITY

## 2024-02-12 RX ORDER — NITROGLYCERIN 0.4 MG/1
0.4 TABLET SUBLINGUAL
Refills: 0 | Status: ACTIVE | COMMUNITY

## 2024-02-12 RX ORDER — LOSARTAN POTASSIUM 50 MG/1
50 TABLET, FILM COATED ORAL TWICE DAILY
Refills: 0 | Status: ACTIVE | COMMUNITY

## 2024-02-12 RX ORDER — SPIRONOLACTONE AND HYDROCHLOROTHIAZIDE 25; 25 MG/1; MG/1
25-25 TABLET, FILM COATED ORAL DAILY
Refills: 0 | Status: ACTIVE | COMMUNITY

## 2024-02-12 RX ORDER — LOSARTAN POTASSIUM 25 MG/1
25 TABLET, FILM COATED ORAL DAILY
Refills: 0 | Status: ACTIVE | COMMUNITY